# Patient Record
Sex: MALE | Race: WHITE | Employment: OTHER | URBAN - METROPOLITAN AREA
[De-identification: names, ages, dates, MRNs, and addresses within clinical notes are randomized per-mention and may not be internally consistent; named-entity substitution may affect disease eponyms.]

---

## 2017-01-27 DIAGNOSIS — E03.9 ACQUIRED HYPOTHYROIDISM: Primary | ICD-10-CM

## 2017-01-27 RX ORDER — LEVOTHYROXINE SODIUM 25 UG/1
TABLET ORAL
Qty: 90 TABLET | Refills: 0 | Status: SHIPPED | OUTPATIENT
Start: 2017-01-27 | End: 2017-03-02

## 2017-01-27 NOTE — TELEPHONE ENCOUNTER
levothyroxine (SYNTHROID, LEVOTHROID) 25 MCG tablet     Last Written Prescription Date: 2/10/2016  Last Quantity: 90, # refills: 3  Last Office Visit with [facility], [facility] or [facility] prescribing provider: 2/10/2016        TSH   Date Value Ref Range Status   02/10/2016 2.77 0.40 - 4.00 mU/L Final

## 2017-01-27 NOTE — TELEPHONE ENCOUNTER
Prescription approved per [facility] Refill Protocol.    Signed Prescriptions:                        Disp   Refills    levothyroxine (SYNTHROID/LEVOTHROID) 25 MC*90 tab*0        Sig: TAKE ONE TABLET BY MOUTH ONCE DAILY  Authorizing Provider: BIANCA LANE  Ordering User: DARCI OLMEDO, RN, BSN

## 2017-02-07 DIAGNOSIS — I10 HYPERTENSION GOAL BP (BLOOD PRESSURE) < 150/90: Primary | ICD-10-CM

## 2017-02-08 NOTE — TELEPHONE ENCOUNTER
triamterene-hydrochlorothiazide (MAXZIDE) 75-50 MG per tablet      Last Written Prescription Date: 2/10/2016  Last Fill Quantity: 45, # refills: 3  Last Office Visit with [facility] or [facility] prescribing provider: 2/10/2016       POTASSIUM   Date Value Ref Range Status   02/10/2016 4.3 3.4 - 5.3 mmol/L Final     CREATININE   Date Value Ref Range Status   02/10/2016 0.64* 0.66 - 1.25 mg/dL Final     BP Readings from Last 3 Encounters:   02/10/16 135/89   01/11/16 112/78   01/07/16 114/84     atenolol (TENORMIN) 25 MG tablet      Last Written Prescription Date: 2/10/2016  Last Fill Quantity: 90, # refills: 3    Last Office Visit with [facility], [facility] or [facility] prescribing provider:  2/10/2016   Future Office Visit:        BP Readings from Last 3 Encounters:   02/10/16 135/89   01/11/16 112/78   01/07/16 114/84

## 2017-02-09 RX ORDER — TRIAMTERENE AND HYDROCHLOROTHIAZIDE 75; 50 MG/1; MG/1
TABLET ORAL
Qty: 15 TABLET | Refills: 0 | Status: SHIPPED | OUTPATIENT
Start: 2017-02-09 | End: 2017-03-02

## 2017-02-09 RX ORDER — ATENOLOL 25 MG/1
TABLET ORAL
Qty: 30 TABLET | Refills: 0 | Status: SHIPPED | OUTPATIENT
Start: 2017-02-09 | End: 2017-03-02

## 2017-02-09 NOTE — TELEPHONE ENCOUNTER
Routing refill request to provider for review/approval because:  Drug interaction warning  High     Drug-Drug: triamterene-hydrochlorothiazide and potassium chloride   Co-administration of Potassium Preparations and Potassium-Sparing Diuretics may cause hyperkalemia, possibly leading to cardiac arrhythmias or cardiac arrest.     Patient needs to be seen because it has been more than 1 year since last office visit.    Ann Bradford RN

## 2017-02-10 ENCOUNTER — TELEPHONE (OUTPATIENT)
Dept: FAMILY MEDICINE | Facility: CLINIC | Age: 67
End: 2017-02-10

## 2017-02-10 DIAGNOSIS — J43.9 COPD (CHRONIC OBSTRUCTIVE PULMONARY DISEASE) WITH EMPHYSEMA (H): Primary | ICD-10-CM

## 2017-02-10 NOTE — TELEPHONE ENCOUNTER
Advair        Last Written Prescription Date: 02/10/2016  Last Fill Quantity: 3 inhaler, # refills: 3    Last Office Visit with [facility] or [facility] prescribing provider:  02/10/2016   Future Office Visit:       Date of Last Asthma Action Plan Letter:   There are no preventive care reminders to display for this patient.   Asthma Control Test:   ACT Total Scores 11/15/2010   ACT TOTAL SCORE 25   ASTHMA ER VISITS 1 = One   ASTHMA HOSPITALIZATIONS 1 = One       Date of Last Spirometry Test:   No results found for this or any previous visit.

## 2017-02-10 NOTE — TELEPHONE ENCOUNTER
Signed Prescriptions:                        Disp   Refills    atenolol (TENORMIN) 25 MG tablet           30 tab*0        Sig: TAKE ONE TABLET BY MOUTH ONCE DAILY  Authorizing Provider: LIANET REY    triamterene-hydrochlorothiazide (MAXZIDE) *15 tab*0        Sig: TAKE ONE-HALF TABLET BY MOUTH ONCE DAILY  Authorizing Provider: LIANET REY

## 2017-02-14 NOTE — TELEPHONE ENCOUNTER
TC - please inform patient a courtesy refill has been sent to her pharmacy as she is over due for an office visit. Last was seen on 2/10/16.  Please help patient scheduling an appointment either for physical or a follow up appointment.     Bakari RAMSEY RN, BSN

## 2017-02-14 NOTE — TELEPHONE ENCOUNTER
Called and spoke with Dick and his mom. They understood and will call back to schedule. Pia Reveles,

## 2017-02-14 NOTE — TELEPHONE ENCOUNTER
Donna is calling to check status on patient's Advair refill. Please call if problems or fill.  Thank-you,  .Colleen [place]

## 2017-03-02 ENCOUNTER — OFFICE VISIT (OUTPATIENT)
Dept: FAMILY MEDICINE | Facility: CLINIC | Age: 67
End: 2017-03-02
Payer: COMMERCIAL

## 2017-03-02 VITALS
OXYGEN SATURATION: 92 % | HEART RATE: 65 BPM | BODY MASS INDEX: 34.66 KG/M2 | WEIGHT: 203 LBS | SYSTOLIC BLOOD PRESSURE: 128 MMHG | HEIGHT: 64 IN | DIASTOLIC BLOOD PRESSURE: 84 MMHG | TEMPERATURE: 98.4 F

## 2017-03-02 DIAGNOSIS — Z23 NEED FOR VACCINATION: ICD-10-CM

## 2017-03-02 DIAGNOSIS — K21.9 GASTROESOPHAGEAL REFLUX DISEASE WITHOUT ESOPHAGITIS: ICD-10-CM

## 2017-03-02 DIAGNOSIS — Z12.11 SCREEN FOR COLON CANCER: ICD-10-CM

## 2017-03-02 DIAGNOSIS — S06.9X0D TRAUMATIC BRAIN INJURY, WITHOUT LOSS OF CONSCIOUSNESS, SUBSEQUENT ENCOUNTER: ICD-10-CM

## 2017-03-02 DIAGNOSIS — R26.9 ABNORMALITY OF GAIT: ICD-10-CM

## 2017-03-02 DIAGNOSIS — I10 HYPERTENSION GOAL BP (BLOOD PRESSURE) < 150/90: ICD-10-CM

## 2017-03-02 DIAGNOSIS — Z71.89 ADVANCED DIRECTIVES, COUNSELING/DISCUSSION: ICD-10-CM

## 2017-03-02 DIAGNOSIS — J43.9 PULMONARY EMPHYSEMA, UNSPECIFIED EMPHYSEMA TYPE (H): ICD-10-CM

## 2017-03-02 DIAGNOSIS — Z00.00 WELLNESS EXAMINATION: Primary | ICD-10-CM

## 2017-03-02 DIAGNOSIS — E03.9 ACQUIRED HYPOTHYROIDISM: ICD-10-CM

## 2017-03-02 DIAGNOSIS — E78.5 HYPERLIPIDEMIA LDL GOAL <130: ICD-10-CM

## 2017-03-02 DIAGNOSIS — B18.2 CHRONIC HEPATITIS C WITHOUT HEPATIC COMA (H): ICD-10-CM

## 2017-03-02 PROCEDURE — 99397 PER PM REEVAL EST PAT 65+ YR: CPT | Mod: 25 | Performed by: FAMILY MEDICINE

## 2017-03-02 PROCEDURE — 84443 ASSAY THYROID STIM HORMONE: CPT | Performed by: FAMILY MEDICINE

## 2017-03-02 PROCEDURE — 90732 PPSV23 VACC 2 YRS+ SUBQ/IM: CPT | Performed by: FAMILY MEDICINE

## 2017-03-02 PROCEDURE — 80061 LIPID PANEL: CPT | Performed by: FAMILY MEDICINE

## 2017-03-02 PROCEDURE — 87522 HEPATITIS C REVRS TRNSCRPJ: CPT | Performed by: FAMILY MEDICINE

## 2017-03-02 PROCEDURE — 80053 COMPREHEN METABOLIC PANEL: CPT | Performed by: FAMILY MEDICINE

## 2017-03-02 PROCEDURE — 36415 COLL VENOUS BLD VENIPUNCTURE: CPT | Performed by: FAMILY MEDICINE

## 2017-03-02 PROCEDURE — G0009 ADMIN PNEUMOCOCCAL VACCINE: HCPCS | Performed by: FAMILY MEDICINE

## 2017-03-02 RX ORDER — DIVALPROEX SODIUM 500 MG/1
TABLET, EXTENDED RELEASE ORAL
Qty: 180 TABLET | Refills: 3 | Status: SHIPPED | OUTPATIENT
Start: 2017-03-02 | End: 2018-04-02

## 2017-03-02 RX ORDER — TIOTROPIUM BROMIDE 18 UG/1
18 CAPSULE ORAL; RESPIRATORY (INHALATION) DAILY
Qty: 90 CAPSULE | Refills: 3 | Status: SHIPPED | OUTPATIENT
Start: 2017-03-02 | End: 2018-01-19

## 2017-03-02 RX ORDER — TRIAMTERENE AND HYDROCHLOROTHIAZIDE 75; 50 MG/1; MG/1
TABLET ORAL
Qty: 45 TABLET | Refills: 3 | Status: SHIPPED | OUTPATIENT
Start: 2017-03-02 | End: 2018-03-19

## 2017-03-02 RX ORDER — ATENOLOL 25 MG/1
25 TABLET ORAL DAILY
Qty: 90 TABLET | Refills: 3 | Status: SHIPPED | OUTPATIENT
Start: 2017-03-02 | End: 2018-08-08 | Stop reason: DRUGHIGH

## 2017-03-02 RX ORDER — POTASSIUM CHLORIDE 750 MG/1
10 TABLET, EXTENDED RELEASE ORAL DAILY
Qty: 90 TABLET | Refills: 3 | Status: SHIPPED | OUTPATIENT
Start: 2017-03-02 | End: 2018-03-18

## 2017-03-02 RX ORDER — LEVOTHYROXINE SODIUM 25 UG/1
25 TABLET ORAL DAILY
Qty: 90 TABLET | Refills: 3 | Status: SHIPPED | OUTPATIENT
Start: 2017-03-02 | End: 2018-04-02

## 2017-03-02 RX ORDER — MONTELUKAST SODIUM 10 MG/1
TABLET ORAL
Qty: 90 TABLET | Refills: 3 | Status: SHIPPED | OUTPATIENT
Start: 2017-03-02 | End: 2018-01-21

## 2017-03-02 NOTE — LETTER
[facility]  [address] [place] [address]    March 22, 2017    Gaudencio Avila  [address]  [facility] [address]          Dear Gaudencio,  Your results are normal.   Enclosed is a copy of your results.     [place] AORTA MEDICARE AAA SCREENING  3/21/2017 10:01 AM     HISTORY:  Screening for abdominal aortic aneurysm.    COMPARISON: None.    FINDINGS: There is no evidence for an abdominal aortic aneurysm.  Maximum diameter of the abdominal aorta is 2.7 cm.    Based on the measurements provided, the common iliac arteries are  ectatic measuring 1.9 cm in diameter bilaterally.    JESSICA OLIVERA MD    If you have any questions or concerns, please call myself or my nurse at [phone].      Sincerely,        Tanya Renee MD/genoveva

## 2017-03-02 NOTE — NURSING NOTE
"Chief Complaint   Patient presents with     Wellness Visit       Initial /84 (BP Location: Right arm, Patient Position: Chair, Cuff Size: Adult Regular)  Pulse 65  Temp 98.4  F (36.9  C) (Oral)  Ht 5' 4\" (1.626 m)  Wt 203 lb (92.1 kg)  SpO2 92%  BMI 34.84 kg/m2 Estimated body mass index is 34.84 kg/(m^2) as calculated from the following:    Height as of this encounter: 5' 4\" (1.626 m).    Weight as of this encounter: 203 lb (92.1 kg).  Medication Reconciliation: complete   Juliet Awan MA      "

## 2017-03-02 NOTE — LETTER
[facility]  [address] [place] [address]    March 6, 2017    Gaudencio Avila  [address]  [facility] [address]          Dear Gaudencio,    You still have hepatitis C. See your GI provider at [facility] to discuss treatment options.     Your thyroid, kidney, and blood glucose tests are normal. Your cholesterol is controlled    Enclosed is a copy of your results.     Results for orders placed or performed in visit on 03/02/17   COMPREHENSIVE METABOLIC PANEL   Result Value Ref Range    Sodium 140 133 - 144 mmol/L    Potassium 4.4 3.4 - 5.3 mmol/L    Chloride 104 94 - 109 mmol/L    Carbon Dioxide 24 20 - 32 mmol/L    Anion Gap 12 3 - 14 mmol/L    Glucose 71 70 - 99 mg/dL    Urea Nitrogen 16 7 - 30 mg/dL    Creatinine 0.70 0.66 - 1.25 mg/dL    GFR Estimate >90  Non  GFR Calc   >60 mL/min/1.7m2    GFR Estimate If Black >90   GFR Calc   >60 mL/min/1.7m2    Calcium 8.9 8.5 - 10.1 mg/dL    Bilirubin Total 0.4 0.2 - 1.3 mg/dL    Albumin 3.2 (L) 3.4 - 5.0 g/dL    Protein Total 8.6 6.8 - 8.8 g/dL    Alkaline Phosphatase 51 40 - 150 U/L    ALT 78 (H) 0 - 70 U/L    AST 44 0 - 45 U/L   Lipid panel reflex to direct LDL   Result Value Ref Range    Cholesterol 130 <200 mg/dL    Triglycerides 134 <150 mg/dL    HDL Cholesterol 28 (L) >39 mg/dL    LDL Cholesterol Calculated 75 <100 mg/dL    Non HDL Cholesterol 102 <130 mg/dL   TSH WITH FREE T4 REFLEX   Result Value Ref Range    TSH 2.17 0.40 - 4.00 mU/L   Hepatitis C RNA, quantitative   Result Value Ref Range    HCV RNA Quant IU/ml 00466 (A) HCVND [IU]/mL    Log of HCV RNA Qt 4.7 (H) <1.2 Log IU/mL       If you have any questions or concerns, please call myself or my nurse at [phone].      Sincerely,        Tanya Renee MD/HA

## 2017-03-02 NOTE — LETTER
My COPD Action Plan   Name: Gaudencio Avila    YOB: 1950   Date: 3/2/2017    My doctor: aTnya Renee MD   My clinic: [address] [id number] [phone]  My Controller Medicine: Tiotropium (Spiriva)   Serevent/Fluticasone (Advair)   Dose: spiriva one puff daily, Advair 250-50 one puff twice daily      My Rescue Medicine: Albuterol (Proair/Ventolin/Proventil) inhaler   Dose: 2 puffs every 4 hours as needed      My Flare Up Medicine:     Dose:    My COPD Severity: Severe = FeV1 < 30%-49%     Use of Oxygen: Oxygen Not Prescribed         GREEN ZONE       Doing well today      Usual level of activity and exercise    Usual amount of cough and mucus    No shortness of breath    Usual level of health (thinking clearly, sleeping well, feel like eating) Actions:      Take daily medicines    Use oxygen as prescribed    Follow regular exercise and diet plan    Avoid cigarette smoke and other irritants that harm the lungs           YELLOW ZONE          Having a bad day or flare up      Short of breath more than usual    A lot more sputum (mucus) than usual    Sputum looks yellow, green, tan, brown or bloody    More coughing or wheezing    Fever or chills    Less energy; trouble completing activities    Trouble thinking or focusing    Using quick relief inhaler or nebulizer more often    Poor sleep; symptoms wake me up    Do not feel like eating Actions:      Get plenty of rest    Take daily medicines    Use quick relief inhaler every 4 hours    If you use oxygen, call you doctor to see if you should adjust your oxygen    Do breathing exercises or other things to help you relax    Let a loved one, friend or neighbor know you are feeling worse    Call your care team if you have 2 or more symptoms.  Start taking steroids or antibiotics if directed by your care team           RED ZONE       Need medical care now      Severe shortness of breath (feel you can't  breathe)    Fever, chills    Not enough breath to do any activity    Trouble coughing up mucus, walking or talking    Blood in mucus    Frequent coughing   Rescue medicines are not working    Not able to sleep because of breathing    Feel confused or drowsy    Chest pain    Actions:      Call your health care team.  If you cannot reach your care team, call 911 or go to the emergency room.        Electronically signed by: Tanya Renee, March 2, 2017  Annual Reminders:  Meet with Care Team, Flu Shot every Fall and Pneumonia Shot at least once  Pharmacy: [id number] - [place] [id number] AT [facility]

## 2017-03-02 NOTE — PROGRESS NOTES
SUBJECTIVE:                                                            Gaudencio Avila is a 66 year old obese disabled male with history of TBI who presents with his  mother for Preventive Visit.    COPD well controlled with medications without dyspnea, wheezing or cough. He is lost to follow-up at [facility] for hepatitis C. His gait has become slow and shuffling. Hypertension well controlled on current medications without side effects, chest pain, or dyspnea. Patient also presents for follow-up of hypothyroidism, controlled on current medication.  Denies symptoms of hypo- or hyperthyroidism. Also presents for follow-up of GERD controlled with medication. Hypercholesterolemia well controlled with current treatment plan without side effects.     Healthy Habits:    Do you get at least three servings of calcium containing foods daily (dairy, green leafy vegetables, etc.)? yes    Amount of exercise or daily activities, outside of work: no    Problems taking medications regularly No    Medication side effects: No    Have you had an eye exam in the past two years? yes    Do you see a dentist twice per year? yes    Do you have sleep apnea, excessive snoring or daytime drowsiness?no    COGNITIVE SCREEN  1) Repeat 3 items (Banana, Sunrise, Chair)    2) Clock draw: NORMAL  3) 3 item recall: Recalls 3 objects  Results: 3 items recalled: COGNITIVE IMPAIRMENT LESS LIKELY  Mini-CogTM Copyright S Gela. Licensed by the author for use in [facility]; reprinted with permission ([email or web address]). All rights reserved.        Social History   Substance Use Topics     Smoking status: Former Smoker     Packs/day: 0.50     Years: 4.00     Types: Cigarettes     Quit date: 1/1/1970     Smokeless tobacco: Never Used     Alcohol use No       The patient does not drink >3 drinks per day nor >7 drinks per week.    Today's PHQ-2 Score:   PHQ-2 ( 1999 [employer]) 3/2/2017 2/10/2016   Q1: Little interest or pleasure in doing  things 0 0   Q2: Feeling down, depressed or hopeless 0 0   PHQ-2 Score 0 0       Do you feel safe in your environment - Yes    Do you have a Health Care Directive?: Yes: Advance Directive has been received and scanned.    Current providers sharing in care for this patient include:   Patient Care Team:  Tanya Renee MD as PCP - General (Family Practice)  Mariah Welch NP as Nurse Practitioner (Nurse Practitioner)      Hearing impairment: No    Ability to successfully perform activities of daily living: Yes, no assistance needed     Fall risk:  Fallen 2 or more times in the past year?: No  Any fall with injury in the past year?: No    Home safety:  none identified      The following health maintenance items are reviewed in Epic and correct as of today:  Health Maintenance   Topic Date Due     AORTIC ANEURYSM SCREENING (SYSTEM ASSIGNED)  04/07/2014     COLONOSCOPY Q3 YR INBASKET MESSAGE  03/21/2016     TSH W/ FREE T4 REFLEX Q1 YEAR (NO INBASKET)  02/10/2017     CMP Q1 YR (NO INBASKET)  02/10/2017     LIPID MONITORING Q1 YEAR( NO INBASKET)  02/10/2017     PNEUMOCOCCAL (2 of 2 - PPSV23) 02/10/2017     FALL RISK ASSESSMENT  02/10/2017     INFLUENZA VACCINE (SYSTEM ASSIGNED)  09/01/2017     COPD ACTION PLAN Q1 YR  03/02/2018     TETANUS IMMUNIZATION (SYSTEM ASSIGNED)  11/15/2020     ADVANCE DIRECTIVE PLANNING Q5 YRS (NO INBASKET)  03/02/2022     SPIROMETRY ONETIME  Completed     HEPATITIS C SCREENING  Addressed     ROS:  C: NEGATIVE for fever, chills, change in weight  I: NEGATIVE for worrisome rashes, moles or lesions  E: NEGATIVE for vision changes or irritation  E/M: NEGATIVE for ear, mouth and throat problems  RESP:as above  CV: NEGATIVE for chest pain, palpitations or peripheral edema  GI: NEGATIVE for nausea, abdominal pain, heartburn, or change in bowel habits  : NEGATIVE for frequency, dysuria, or hematuria  M: NEGATIVE for significant arthralgias or myalgia  NEURO: as above   E: NEGATIVE for temperature  "intolerance, skin/hair changes  H: NEGATIVE for bleeding problems  P: NEGATIVE for changes in mood or affect    Problem list, Medication list, Allergies, and Medical/Social/Surgical histories reviewed in EPIC and updated as appropriate.  OBJECTIVE:                                                            /84 (BP Location: Right arm, Patient Position: Chair, Cuff Size: Adult Regular)  Pulse 65  Temp 98.4  F (36.9  C) (Oral)  Ht 5' 4\" (1.626 m)  Wt 203 lb (92.1 kg)  SpO2 92%  BMI 34.84 kg/m2 Estimated body mass index is 34.84 kg/(m^2) as calculated from the following:    Height as of this encounter: 5' 4\" (1.626 m).    Weight as of this encounter: 203 lb (92.1 kg).  EXAM:   GENERAL: alert, no distress and obese  EYES: Eyes grossly normal to inspection, PERRL and conjunctivae and sclerae normal  HENT: normal cephalic/atraumatic, ear canals and TM's normal, nose and mouth without ulcers or lesions, oropharynx clear, oral mucous membranes moist and poor dentition   NECK: no adenopathy, no asymmetry, masses, or scars and thyroid normal to palpation  RESP: decreased breath sounds throughout  CV: regular rate and rhythm, normal S1 S2, no S3 or S4, no murmur, click or rub, no peripheral edema and peripheral pulses strong  ABDOMEN: soft, nontender, no hepatosplenomegaly, no masses and bowel sounds normal  MS: no deformity, slow/shuffling gait   SKIN: no suspicious lesions or rashes and seborrhea   NEURO: Normal strength and tone, sensory exam grossly normal, abnormal mental status - sparse speech and slow, shuffling gait   PSYCH: affect normal/bright    ASSESSMENT / PLAN:                                                            (Z00.00) Wellness examination  (primary encounter diagnosis)  Plan: Lipid panel reflex to direct LDL, US Aorta         Medicare AAA Screening, PNEUMOVOCCAL VACCINE 23        VALENT (PNEUMOVAX 23), GASTROENTEROLOGY ADULT         REF CONSULT ONLY          (S06.9X0D) Traumatic brain injury, " without loss of consciousness, subsequent encounter  Plan: divalproex (DEPAKOTE ER) 500 MG 24 hr tablet,         NEUROLOGY ADULT REFERRAL          (J43.9) Pulmonary emphysema, unspecified emphysema type (H)  Plan: COPD ACTION PLAN, fluticasone-salmeterol         (ADVAIR DISKUS) 500-50 MCG/DOSE diskus inhaler,        montelukast (SINGULAIR) 10 MG tablet,         tiotropium (SPIRIVA HANDIHALER) 18 MCG capsule          (B18.2) Chronic hepatitis C without hepatic coma (H)  Plan: COMPREHENSIVE METABOLIC PANEL, GASTROENTEROLOGY        ADULT REF CONSULT ONLY, Hepatitis C RNA,         quantitative          (R26.9) Abnormality of gait  Comment: Differential diagnoses would include: Parkinsonism   Plan: NEUROLOGY ADULT REFERRAL          (I10) Hypertension goal BP (blood pressure) < 150/90  Comment: Well controlled with medications without side effects.   Plan: COMPREHENSIVE METABOLIC PANEL, atenolol         (TENORMIN) 25 MG tablet,         triamterene-hydrochlorothiazide (MAXZIDE) 75-50        MG per tablet, potassium chloride (POTASSIUM         CHLORIDE) 10 MEQ tablet          (E03.9) Acquired hypothyroidism  Comment: euthyroid on replacement   Lab Results   Component Value Date    TSH 2.77 02/10/2016   Plan: TSH WITH FREE T4 REFLEX, levothyroxine         (SYNTHROID/LEVOTHROID) 25 MCG tablet          (K21.9) Gastroesophageal reflux disease without esophagitis  Comment: Well controlled with medications without side effects.   Plan: omeprazole (PRILOSEC) 20 MG CR capsule          (E78.5) Hyperlipidemia LDL goal <130  Plan: COMPREHENSIVE METABOLIC PANEL, Lipid panel         reflex to direct LDL             End of Life Planning:  Patient currently has an advanced directive: No.  I have verified the patient's ablity to prepare an advanced directive/make health care decisions.  Literature was provided to assist patient in preparing an advanced directive.    COUNSELING:  Reviewed preventive health counseling, as reflected in  "patient instructions  Special attention given to:       Consider AAA screening for ages 65-75 and smoking history       Regular exercise       Healthy diet/nutrition       Dental care       Aspirin Prophylaxsis       Hepatitis C screening       Colon cancer screening       Osteoporosis Prevention/Bone Health       The 10-year ASCVD risk score (Santiago MONTILLA Jr., et al., 2013) is: 17.7%    Values used to calculate the score:      Age: 66 years      Sex: Male      Is Non- : No      Diabetic: No      Tobacco smoker: No      Systolic Blood Pressure: 128 mmHg      Is Prescribed Antihypertensives: Yes      HDL Cholesterol: 29 mg/dL      Total Cholesterol: 134 mg/dL        Estimated body mass index is 34.84 kg/(m^2) as calculated from the following:    Height as of this encounter: 5' 4\" (1.626 m).    Weight as of this encounter: 203 lb (92.1 kg).  Weight management plan: Discussed healthy diet and exercise guidelines and patient will follow up in 12 months in clinic to re-evaluate.   reports that he quit smoking about 47 years ago. His smoking use included Cigarettes. He has a 2.00 pack-year smoking history. He has never used smokeless tobacco.      Appropriate preventive services were discussed with this patient, including applicable screening as appropriate for cardiovascular disease, diabetes, osteopenia/osteoporosis, and glaucoma.  As appropriate for age/gender, discussed screening for colorectal cancer, prostate cancer, breast cancer, and cervical cancer. Checklist reviewing preventive services available has been given to the patient.    Reviewed patients plan of care and provided an AVS. The Basic Care Plan (routine screening as documented in Health Maintenance) for Gaudencio meets the Care Plan requirement. This Care Plan has been established and reviewed with the Patient and caregiver.    Counseling Resources:  ATP IV Guidelines  Pooled Cohorts Equation Calculator  Breast Cancer Risk " Calculator  FRAX Risk Assessment  ICSI Preventive Guidelines  Dietary Guidelines for Americans, 2010  USDA's MyPlate  ASA Prophylaxis  Lung CA Screening    Tanya Renee MD  [facility]

## 2017-03-02 NOTE — PATIENT INSTRUCTIONS
Call the imaging center at [phone] or  [phone] to schedule your aortic ultrasound to screen for aneursym.      [facility]    If you have any questions regarding to your visit please contact your care team:       Team Red:   Clinic Hours Telephone Number   Dr. Tanya Saha, NP   7am-7pm  Monday - Thursday   7am-5pm  Fridays  [phone]  (Appointment scheduling available 24/7)    Questions about your visit?   Team Line  [phone]   [facility] [place] [facility] 11am-9pm Monday-Friday Saturday-Sunday- 9am-5pm   [place] - 5pm-9pm Monday-Friday Saturday-Sunday- 9am-5pm  [phone] - [place]   [phone] - [place]       What options do I have for visits at the clinic other than the traditional office visit?  To expand how we care for you, many of our providers are utilizing electronic visits (e-visits) and telephone visits, when medically appropriate, for interactions with their patients rather than a visit in the clinic.   We also offer nurse visits for many medical concerns. Just like any other service, we will bill your insurance company for this type of visit based on time spent on the phone with your provider. Not all insurance companies cover these visits. Please check with your medical insurance if this type of visit is covered. You will be responsible for any charges that are not paid by your insurance.      E-visits via OpenDrive:  generally incur a $35.00 fee.  Telephone visits:  Time spent on the phone: *charged based on time that is spent on the phone in increments of 10 minutes. Estimated cost:   5-10 mins $30.00   11-20 mins. $59.00   21-30 mins. $85.00     Use OpenClovist (secure email communication and access to your chart) to send your primary care provider a message or make an appointment. Ask someone on your Team how to sign up for OpenDrive.  For a Price Quote for your services, please call our  Consumer [place] Line at [phone].      As always, Thank you for trusting us with your health care needs!  Taylor MITCHELL MA      Preventive Health Recommendations:   Male Ages 65 and over    Yearly exam:             See your health care provider every year in order to  o   Review health changes.   o   Discuss preventive care.    o   Review your medicines if your doctor has prescribed any.    Talk with your health care provider about whether you should have a test to screen for prostate cancer (PSA).    Every 3 years, have a diabetes test (fasting glucose). If you are at risk for diabetes, you should have this test more often.    Every 5 years, have a cholesterol test. Have this test more often if you are at risk for high cholesterol or heart disease.     Every 10 years, have a colonoscopy. Or, have a yearly FIT test (stool test). These exams will check for colon cancer.    Talk to with your health care provider about screening for Abdominal Aortic Aneurysm if you have a family history of AAA or have a history of smoking.  Shots:     Get a flu shot each year.     Get a tetanus shot every 10 years.     Talk to your doctor about your pneumonia vaccines. There are now two you should receive - Pneumovax (PPSV 23) and Prevnar (PCV 13).    Talk to your doctor about a shingles vaccine.     Talk to your doctor about the hepatitis B vaccine.  Nutrition:     Eat at least 5 servings of fruits and vegetables each day.     Eat whole-grain bread, whole-wheat pasta and brown rice instead of white grains and rice.     Talk to your doctor about Calcium and Vitamin D.   Lifestyle    Exercise for at least 150 minutes a week (30 minutes a day, 5 days a week). This will help you control your weight and prevent disease.     Limit alcohol to one drink per day.     No smoking.     Wear sunscreen to prevent skin cancer.     See your dentist every six months for an exam and cleaning.     See your eye doctor every 1 to 2 years to screen for  conditions such as glaucoma, macular degeneration and cataracts.

## 2017-03-02 NOTE — MR AVS SNAPSHOT
After Visit Summary   3/2/2017    Gaudencio Avila    MRN: [id number]           Patient Information     Date Of Birth          1950        Visit Information        Provider Department      3/2/2017 2:20 PM Tanya Renee MD [facility]        Today's Diagnoses     Wellness examination    -  1    Traumatic brain injury, without loss of consciousness, subsequent encounter        Pulmonary emphysema, unspecified emphysema type (H)        Chronic hepatitis C without hepatic coma (H)        Abnormality of gait        Hypertension goal BP (blood pressure) < 150/90        Acquired hypothyroidism        Gastroesophageal reflux disease without esophagitis        Hyperlipidemia LDL goal <130        Screen for colon cancer        Advanced directives, counseling/discussion        Need for vaccination          Care Instructions    Call the imaging center at [phone] or  [phone] to schedule your aortic ultrasound to screen for aneursym.      [facility] (formerly [facility])[3]    If you have any questions regarding to your visit please contact your care team:       Team Red:   Clinic Hours Telephone Number   Dr. Tanya Saha, NP   7am-7pm  Monday - Thursday   7am-5pm  Fridays  [phone]  (Appointment scheduling available 24/7)    Questions about your visit?   Team Line  [phone]   Urgent Care - [place] and [place] - 11am-9pm Monday-Friday Saturday-Sunday- 9am-5pm   [place] - 5pm-9pm Monday-Friday Saturday-Sunday- 9am-5pm  [phone] - [place]   [phone] - [place]       What options do I have for visits at the clinic other than the traditional office visit?  To expand how we care for you, many of our providers are utilizing electronic visits (e-visits) and telephone visits, when medically appropriate, for interactions with their patients rather than a visit in the clinic.   We also offer nurse visits for many  medical concerns. Just like any other service, we will bill your insurance company for this type of visit based on time spent on the phone with your provider. Not all insurance companies cover these visits. Please check with your medical insurance if this type of visit is covered. You will be responsible for any charges that are not paid by your insurance.      E-visits via [employer]:  generally incur a $35.00 fee.  Telephone visits:  Time spent on the phone: *charged based on time that is spent on the phone in increments of 10 minutes. Estimated cost:   5-10 mins $30.00   11-20 mins. $59.00   21-30 mins. $85.00     Use Quolaw (secure email communication and access to your chart) to send your primary care provider a message or make an appointment. Ask someone on your Team how to sign up for Quolaw.  For a Price Quote for your services, please call our uStudio Line at [phone].      As always, Thank you for trusting us with your health care needs!  Taylor MITCHELL MA      Preventive Health Recommendations:   Male Ages 65 and over    Yearly exam:             See your health care provider every year in order to  o   Review health changes.   o   Discuss preventive care.    o   Review your medicines if your doctor has prescribed any.    Talk with your health care provider about whether you should have a test to screen for prostate cancer (PSA).    Every 3 years, have a diabetes test (fasting glucose). If you are at risk for diabetes, you should have this test more often.    Every 5 years, have a cholesterol test. Have this test more often if you are at risk for high cholesterol or heart disease.     Every 10 years, have a colonoscopy. Or, have a yearly FIT test (stool test). These exams will check for colon cancer.    Talk to with your health care provider about screening for Abdominal Aortic Aneurysm if you have a family history of AAA or have a history of smoking.  Shots:     Get a flu shot each year.     Get a  tetanus shot every 10 years.     Talk to your doctor about your pneumonia vaccines. There are now two you should receive - Pneumovax (PPSV 23) and Prevnar (PCV 13).    Talk to your doctor about a shingles vaccine.     Talk to your doctor about the hepatitis B vaccine.  Nutrition:     Eat at least 5 servings of fruits and vegetables each day.     Eat whole-grain bread, whole-wheat pasta and brown rice instead of white grains and rice.     Talk to your doctor about Calcium and Vitamin D.   Lifestyle    Exercise for at least 150 minutes a week (30 minutes a day, 5 days a week). This will help you control your weight and prevent disease.     Limit alcohol to one drink per day.     No smoking.     Wear sunscreen to prevent skin cancer.     See your dentist every six months for an exam and cleaning.     See your eye doctor every 1 to 2 years to screen for conditions such as glaucoma, macular degeneration and cataracts.        Follow-ups after your visit        Additional Services     GASTROENTEROLOGY ADULT REF CONSULT ONLY       Preferred Location: [facility] [phone]      Please be aware that coverage of these services is subject to the terms and limitations of your health insurance plan.  Call member services at your health plan with any benefit or coverage questions.  Any procedures must be performed at a [place] facility OR coordinated by your clinic's referral office.    Please bring the following with you to your appointment:    (1) Any X-Rays, CTs or MRIs which have been performed.  Contact the facility where they were done to arrange for  prior to your scheduled appointment.    (2) List of current medications   (3) This referral request   (4) Any documents/labs given to you for this referral            GASTROENTEROLOGY ADULT REF PROCEDURE ONLY       Last Lab Result: Creatinine (mg/dL)       Date                     Value                 02/10/2016               0.64 (L)         ----------  Body mass index  is 34.84 kg/(m^2).     Needed:  No  Language:  English    Patient will be contacted to schedule procedure.     Please be aware that coverage of these services is subject to the terms and limitations of your health insurance plan.  Call member services at your health plan with any benefit or coverage questions.  Any procedures must be performed at a [place] facility OR coordinated by your clinic's referral office.    Please bring the following with you to your appointment:    (1) Any X-Rays, CTs or MRIs which have been performed.  Contact the facility where they were done to arrange for  prior to your scheduled appointment.    (2) List of current medications   (3) This referral request   (4) Any documents/labs given to you for this referral            NEUROLOGY ADULT REFERRAL       Your provider has referred you to: G: [place] [facility] [phone]   [email or web address]    Reason for Referral: Consult    Please be aware that coverage of these services is subject to the terms and limitations of your health insurance plan.  Call member services at your health plan with any benefit or coverage questions.      Please bring the following with you to your appointment:    (1) Any X-Rays, CTs or MRIs which have been performed.  Contact the facility where they were done to arrange for  prior to your scheduled appointment.    (2) List of current medications  (3) This referral request   (4) Any documents/labs given to you for this referral                  Follow-up notes from your care team     Return in about 1 year (around 3/2/2018) for Wellness visit (fasting labs up to one week prior).      Who to contact     If you have questions or need follow up information about today's clinic visit or your schedule please contact [facility] directly at [phone].  Normal or non-critical lab and imaging results will be communicated to you by Ángela  "letter or phone within 4 business days after the clinic has received the results. If you do not hear from us within 7 days, please contact the clinic through [employer]IN[employer]PUBL[employer] or phone. If you have a critical or abnormal lab result, we will notify you by phone as soon as possible.  Submit refill requests through [employer] or call your pharmacy and they will forward the refill request to us. Please allow 3 business days for your refill to be completed.          Additional Information About Your Visit        B[employer]IN[employer]PUBLIC Information     [employer] lets you send messages to your doctor, view your test results, renew your prescriptions, schedule appointments and more. To sign up, go to [email or web address] . Click on \"Log in\" on the left side of the screen, which will take you to the Welcome page. Then click on \"Sign up Now\" on the right side of the page.     You will be asked to enter the access code listed below, as well as some personal information. Please follow the directions to create your username and password.     Your access code is: [email or web address]  Expires: 2017  3:02 PM     Your access code will  in 90 days. If you need help or a new code, please call your [place] clinic or [phone].        Care EveryWhere ID     This is your Care EveryWhere ID. This could be used by other organizations to access your [place] medical records  [id number]        Your Vitals Were     Pulse Temperature Height Pulse Oximetry BMI (Body Mass Index)       65 98.4  F (36.9  C) (Oral) 5' 4\" (1.626 m) 92% 34.84 kg/m2        Blood Pressure from Last 3 Encounters:   17 128/84   02/10/16 135/89   16 112/78    Weight from Last 3 Encounters:   17 203 lb (92.1 kg)   02/10/16 188 lb (85.3 kg)   16 182 lb 6 oz (82.7 kg)              We Performed the Following     COMPREHENSIVE METABOLIC PANEL     COPD ACTION PLAN     GASTROENTEROLOGY ADULT REF CONSULT ONLY     GASTROENTEROLOGY ADULT REF PROCEDURE ONLY     Hepatitis C " RNA, quantitative     Lipid panel reflex to direct LDL     NEUROLOGY ADULT REFERRAL     PNEUMOVOCCAL VACCINE 23 VALENT (PNEUMOVAX 23)     TSH WITH FREE T4 REFLEX     US [place]orta Medicare AAA Screening          Today's Medication Changes          These changes are accurate as of: 3/2/17  3:02 PM.  If you have any questions, ask your nurse or doctor.               These medicines have changed or have updated prescriptions.        Dose/Directions    atenolol 25 MG tablet   Commonly known as:  TENORMIN   This may have changed:  See the new instructions.   Used for:  Hypertension goal BP (blood pressure) < 150/90   Changed by:  Tanya Renee MD        Dose:  25 mg   Take 1 tablet (25 mg) by mouth daily   Quantity:  90 tablet   Refills:  3       fluticasone-salmeterol 500-50 MCG/DOSE diskus inhaler   Commonly known as:  ADVAIR DISKUS   This may have changed:  See the new instructions.   Used for:  Pulmonary emphysema, unspecified emphysema type (H)   Changed by:  Tanya Renee MD        INHALE 1 PUFF INTO THE LUNGS 2 TIMES DAILY   Quantity:  3 Inhaler   Refills:  3       levothyroxine 25 MCG tablet   Commonly known as:  SYNTHROID/LEVOTHROID   This may have changed:  See the new instructions.   Used for:  Acquired hypothyroidism   Changed by:  Tanya Renee MD        Dose:  25 mcg   Take 1 tablet (25 mcg) by mouth daily   Quantity:  90 tablet   Refills:  3       triamterene-hydrochlorothiazide 75-50 MG per tablet   Commonly known as:  MAXZIDE   This may have changed:  See the new instructions.   Used for:  Hypertension goal BP (blood pressure) < 150/90   Changed by:  Tanya Renee MD        TAKE ONE-HALF TABLET BY MOUTH ONCE DAILY   Quantity:  45 tablet   Refills:  3            Where to get your medicines      These medications were sent to [facility]/pharmacy #[id number] - [place], [place] - [facility] [address] OF [address], [place] [address]     Phone:  [phone]     atenolol 25 MG  tablet    divalproex 500 MG 24 hr tablet    fluticasone-salmeterol 500-50 MCG/DOSE diskus inhaler    levothyroxine 25 MCG tablet    montelukast 10 MG tablet    omeprazole 20 MG CR capsule    potassium chloride 10 MEQ tablet    tiotropium 18 MCG capsule    triamterene-hydrochlorothiazide 75-50 MG per tablet                Primary Care Provider Office Phone # Fax #    Tanya Renee [phone]       [address] [address]        Goals        General    Medical (pt-stated)     Notes - Note created  12/18/2015 12:27 PM by Pooja Campbell RN    Patient/mom will state understanding of S/S of worsening respiratory status and appropriate interventions, such as, calling clinic or seeking emergency care.       Medical (pt-stated)     Notes - Note created  12/18/2015 12:28 PM by Pooja Campbell RN    Patient/mom will state understanding regarding interventions to use to maintain stable respiratory status, such as, appropriate nutrition and hydration, appropriate medication/inhaler/nebulizer use.          Thank you!     Thank you for choosing [facility]  for your care. Our goal is always to provide you with excellent care. Hearing back from our patients is one way we can continue to improve our services. Please take a few minutes to complete the written survey that you may receive in the mail after your visit with us. Thank you!             Your Updated Medication List - Protect others around you: Learn how to safely use, store and throw away your medicines at www.disposemymeds.org.          This list is accurate as of: 3/2/17  3:02 PM.  Always use your most recent med list.                   Brand Name Dispense Instructions for use    * albuterol (2.5 MG/3ML) 0.083% neb solution     225 mL    TAKE 3 MLS BY NEBULIZATION EVERY 4 HOURS AS NEEDED       * albuterol 108 (90 BASE) MCG/ACT Inhaler    albuterol    1 Inhaler    Inhale 1-2 puffs into the lungs every  4 hours as needed       aspirin 81 MG tablet      Take 1 tablet by mouth daily.       atenolol 25 MG tablet    TENORMIN    90 tablet    Take 1 tablet (25 mg) by mouth daily       cholecalciferol 1000 UNIT tablet    vitamin D     Take 1 tablet (1,000 Units) by mouth daily       divalproex 500 MG 24 hr tablet    DEPAKOTE ER    180 tablet    TAKE 2 TABLETS (1,000 MG) BY MOUTH DAILY       fluticasone-salmeterol 500-50 MCG/DOSE diskus inhaler    ADVAIR DISKUS    3 Inhaler    INHALE 1 PUFF INTO THE LUNGS 2 TIMES DAILY       levothyroxine 25 MCG tablet    SYNTHROID/LEVOTHROID    90 tablet    Take 1 tablet (25 mcg) by mouth daily       montelukast 10 MG tablet    SINGULAIR    90 tablet    TAKE 1 TABLET (10 MG) BY MOUTH AT BEDTIME       omeprazole 20 MG CR capsule    priLOSEC    90 capsule    Take 1 capsule (20 mg) by mouth daily       potassium chloride 10 MEQ tablet    potassium chloride    90 tablet    Take 1 tablet (10 mEq) by mouth daily       tiotropium 18 MCG capsule    SPIRIVA HANDIHALER    90 capsule    Inhale 1 capsule (18 mcg) into the lungs daily Inhale contents of one capsule       triamterene-hydrochlorothiazide 75-50 MG per tablet    MAXZIDE    45 tablet    TAKE ONE-HALF TABLET BY MOUTH ONCE DAILY       * Notice:  This list has 2 medication(s) that are the same as other medications prescribed for you. Read the directions carefully, and ask your doctor or other care provider to review them with you.

## 2017-03-03 LAB
ALBUMIN SERPL-MCNC: 3.2 G/DL (ref 3.4–5)
ALP SERPL-CCNC: 51 U/L (ref 40–150)
ALT SERPL W P-5'-P-CCNC: 78 U/L (ref 0–70)
ANION GAP SERPL CALCULATED.3IONS-SCNC: 12 MMOL/L (ref 3–14)
AST SERPL W P-5'-P-CCNC: 44 U/L (ref 0–45)
BILIRUB SERPL-MCNC: 0.4 MG/DL (ref 0.2–1.3)
BUN SERPL-MCNC: 16 MG/DL (ref 7–30)
CALCIUM SERPL-MCNC: 8.9 MG/DL (ref 8.5–10.1)
CHLORIDE SERPL-SCNC: 104 MMOL/L (ref 94–109)
CHOLEST SERPL-MCNC: 130 MG/DL
CO2 SERPL-SCNC: 24 MMOL/L (ref 20–32)
CREAT SERPL-MCNC: 0.7 MG/DL (ref 0.66–1.25)
GFR SERPL CREATININE-BSD FRML MDRD: ABNORMAL ML/MIN/1.7M2
GLUCOSE SERPL-MCNC: 71 MG/DL (ref 70–99)
HDLC SERPL-MCNC: 28 MG/DL
LDLC SERPL CALC-MCNC: 75 MG/DL
NONHDLC SERPL-MCNC: 102 MG/DL
POTASSIUM SERPL-SCNC: 4.4 MMOL/L (ref 3.4–5.3)
PROT SERPL-MCNC: 8.6 G/DL (ref 6.8–8.8)
SODIUM SERPL-SCNC: 140 MMOL/L (ref 133–144)
TRIGL SERPL-MCNC: 134 MG/DL
TSH SERPL DL<=0.005 MIU/L-ACNC: 2.17 MU/L (ref 0.4–4)

## 2017-03-06 LAB
HCV RNA SERPL NAA+PROBE-ACNC: ABNORMAL [IU]/ML
HCV RNA SERPL NAA+PROBE-LOG IU: 4.7 LOG IU/ML

## 2017-03-06 NOTE — PROGRESS NOTES
Mail letter:  You still have hepatitis C. See your GI provider at [facility] to discuss treatment options.     Your thyroid, kidney, and blood glucose tests are normal. Your cholesterol is controlled.     Tanya Renee MD

## 2017-03-16 DIAGNOSIS — I10 HYPERTENSION GOAL BP (BLOOD PRESSURE) < 150/90: ICD-10-CM

## 2017-03-17 RX ORDER — POTASSIUM CHLORIDE 750 MG/1
TABLET, FILM COATED, EXTENDED RELEASE ORAL
Qty: 90 TABLET | Refills: 2
Start: 2017-03-17

## 2017-03-21 ENCOUNTER — RADIANT APPOINTMENT (OUTPATIENT)
Dept: ULTRASOUND IMAGING | Facility: CLINIC | Age: 67
End: 2017-03-21
Attending: FAMILY MEDICINE
Payer: COMMERCIAL

## 2017-03-21 PROCEDURE — 76706 US ABDL AORTA SCREEN AAA: CPT

## 2017-03-27 ENCOUNTER — TRANSFERRED RECORDS (OUTPATIENT)
Dept: HEALTH INFORMATION MANAGEMENT | Facility: CLINIC | Age: 67
End: 2017-03-27

## 2017-03-31 ENCOUNTER — TELEPHONE (OUTPATIENT)
Dept: FAMILY MEDICINE | Facility: CLINIC | Age: 67
End: 2017-03-31

## 2017-03-31 DIAGNOSIS — J43.9 PULMONARY EMPHYSEMA, UNSPECIFIED EMPHYSEMA TYPE (H): Primary | ICD-10-CM

## 2017-04-04 NOTE — TELEPHONE ENCOUNTER
Please clarify request as refills were already sent - is this a prior authorization request?    Signed Prescriptions:                        Disp   Refills    ADVAIR DISKUS 500-50 MCG/DOSE diskus inhal*3 Inha*3        Sig: INHALE 1 PUFF INTO THE LUNGS 2 TIMES DAILY  Authorizing Provider: BIANCA LANE

## 2017-04-04 NOTE — TELEPHONE ENCOUNTER
fluticasone-salmeterol (ADVAIR DISKUS) 500-50 MCG/DOSE diskus inhaler      Last Written Prescription Date: 3/2/17  Last Fill Quantity: 3, # refills: 3    Last Office Visit with G, P or [facility] prescribing provider:  3/2/17   Future Office Visit:       Date of Last Asthma Action Plan Letter:   There are no preventive care reminders to display for this patient.   Asthma Control Test:   ACT Total Scores 11/15/2010   ACT TOTAL SCORE 25   ASTHMA ER VISITS 1 = One   ASTHMA HOSPITALIZATIONS 1 = One       Date of Last Spirometry Test:   No results found for this or any previous visit.

## 2017-04-04 NOTE — TELEPHONE ENCOUNTER
Routing refill request to provider for review/approval because:  Patient not taking for Asthma/COPD per G refill protocol list.    Please advise.    Haritha Ellison RN - BC

## 2017-04-28 ENCOUNTER — TRANSFERRED RECORDS (OUTPATIENT)
Dept: HEALTH INFORMATION MANAGEMENT | Facility: CLINIC | Age: 67
End: 2017-04-28

## 2017-04-30 ENCOUNTER — TELEPHONE (OUTPATIENT)
Dept: FAMILY MEDICINE | Facility: CLINIC | Age: 67
End: 2017-04-30

## 2017-04-30 NOTE — TELEPHONE ENCOUNTER
Hello,  Someone from [facility] pharmacy called for this patient.  She wanted to leave a message for his primary care provider to let you know that you should set up an appointment to check his potassium levels because of the medications he is taking.    Please contact this patient.  Thank you!  Naz FARLEY  Central Scheduler

## 2017-05-01 NOTE — TELEPHONE ENCOUNTER
Last potassium is within normal range.  Please review and advise if patient will need to complete potassium.    Bakari RAMSEY RN, BSN

## 2018-02-09 ENCOUNTER — TRANSFERRED RECORDS (OUTPATIENT)
Dept: HEALTH INFORMATION MANAGEMENT | Facility: CLINIC | Age: 68
End: 2018-02-09

## 2018-03-18 DIAGNOSIS — I10 HYPERTENSION GOAL BP (BLOOD PRESSURE) < 150/90: ICD-10-CM

## 2018-03-19 NOTE — TELEPHONE ENCOUNTER
"Routing refill request to provider for review/approval because:  Labs out of range: Last OV  3/2/2017   Recent (12 mo) or future (30 days) visit within the authorizing provider's specialty    Normal serum potassium in past 12 months     Requested Prescriptions   Pending Prescriptions Disp Refills     KLOR-CON 10 MEQ tablet [Pharmacy Med Name: KLOR-CON 10 MEQ TABLET] 90 tablet 2     Sig: TAKE 1 TABLET (10 MEQ) BY MOUTH DAILY    Potassium Supplements Protocol Failed    3/19/2018  7:42 AM       Failed - Recent (12 mo) or future (30 days) visit within the authorizing provider's specialty    Patient had office visit in the last 12 months or has a visit in the next 30 days with authorizing provider or within the authorizing provider's specialty.  See \"Patient Info\" tab in inbasket, or \"Choose Columns\" in Meds & Orders section of the refill encounter.           Failed - Normal serum potassium in past 12 months    Recent Labs   Lab Test  03/02/17   1509   POTASSIUM  4.4                   Passed - Patient is age 18 or older        Jesusita Pizano RN    "

## 2018-03-19 NOTE — TELEPHONE ENCOUNTER
Routing refill request to provider for review/approval because:  Patient needs to be seen because it has been more than 1 year since last office visit.  Please see drug to drug interaction warning as well    Drug-Drug: triamterene-hydrochlorothiazide and potassium chloride  Co-administration of Potassium Preparations and Potassium-Sparing Diuretics may cause hyperkalemia, possibly leading to cardiac arrhythmias or cardiac arrest.   Details      Analy Jurado RN

## 2018-03-20 RX ORDER — POTASSIUM CHLORIDE 750 MG/1
TABLET, FILM COATED, EXTENDED RELEASE ORAL
Qty: 30 TABLET | Refills: 0 | Status: SHIPPED | OUTPATIENT
Start: 2018-03-20 | End: 2018-04-24

## 2018-03-20 RX ORDER — TRIAMTERENE AND HYDROCHLOROTHIAZIDE 75; 50 MG/1; MG/1
TABLET ORAL
Qty: 15 TABLET | Refills: 0 | Status: SHIPPED | OUTPATIENT
Start: 2018-03-20 | End: 2018-04-24

## 2018-03-20 NOTE — TELEPHONE ENCOUNTER
Signed Prescriptions:                        Disp   Refills    KLOR-CON 10 MEQ tablet                     30 tab*0        Sig: TAKE 1 TABLET (10 MEQ) BY MOUTH DAILY  Authorizing Provider: BIANCA LANE    No further refills until follow-up appointment

## 2018-03-20 NOTE — TELEPHONE ENCOUNTER
Signed Prescriptions:                        Disp   Refills    triamterene-hydrochlorothiazide (MAXZIDE) *15 tab*0        Sig: TAKE ONE-HALF TABLET BY MOUTH ONCE DAILY  Authorizing Provider: BIANCA LANE    No further refills until follow-up appointment

## 2018-04-02 DIAGNOSIS — E03.9 ACQUIRED HYPOTHYROIDISM: ICD-10-CM

## 2018-04-02 DIAGNOSIS — J43.9 PULMONARY EMPHYSEMA, UNSPECIFIED EMPHYSEMA TYPE (H): ICD-10-CM

## 2018-04-02 DIAGNOSIS — S06.9X0D TRAUMATIC BRAIN INJURY, WITHOUT LOSS OF CONSCIOUSNESS, SUBSEQUENT ENCOUNTER: ICD-10-CM

## 2018-04-04 RX ORDER — LEVOTHYROXINE SODIUM 25 UG/1
TABLET ORAL
Qty: 30 TABLET | Refills: 0 | Status: SHIPPED | OUTPATIENT
Start: 2018-04-04 | End: 2018-05-03

## 2018-04-04 RX ORDER — DIVALPROEX SODIUM 500 MG/1
TABLET, EXTENDED RELEASE ORAL
Qty: 60 TABLET | Refills: 0 | Status: SHIPPED | OUTPATIENT
Start: 2018-04-04 | End: 2018-05-03

## 2018-04-04 NOTE — TELEPHONE ENCOUNTER
Signed Prescriptions:                        Disp   Refills    divalproex sodium extended-release (DEPAKO*60 tab*0        Sig: TAKE 2 TABLETS (1,000 MG) BY MOUTH DAILY  Authorizing Provider: BIANCA LANE    levothyroxine (SYNTHROID/LEVOTHROID) 25 MC*30 tab*0        Sig: TAKE 1 TABLET (25 MCG) BY MOUTH DAILY  Authorizing Provider: BIANCA LANE    ADVAIR DISKUS 500-50 MCG/DOSE diskus inhal*1 Inha*0        Sig: INHALE 1 PUFF INTO THE LUNGS TWICE DAILY  Authorizing Provider: BIANCA LANE    No further refills until follow-up appointment

## 2018-04-04 NOTE — TELEPHONE ENCOUNTER
"Routing refill request to provider for review/approval because:  Failed [facility] refill protocol, see below:      Requested Prescriptions   Pending Prescriptions Disp Refills     divalproex sodium extended-release (DEPAKOTE ER) 500 MG 24 hr tablet [Pharmacy Med Name: DIVALPROEX SOD  MG TAB]  Last Written Prescription Date:  3/2/17  Last Fill Quantity: 90,  # refills: 3   Last office visit: 3/2/2017 with prescribing provider:  0    180 tablet 3     Sig: TAKE 2 TABLETS (1,000 MG) BY MOUTH DAILY    Anticonvulsants Protocol  Failed    4/2/2018  9:18 AM       Failed - Review Authorizing provider's last note.     Refer to last progress notes: confirm request is for original authorizing provider (cannot be through other providers).         Failed - Depakote level within therapeutic range in past 6 months    No results found for: VALPROIC  Depakote level must be checked 2-4 weeks after dosage change.         Failed - Normal ALT on file in past 6 months    Recent Labs   Lab Test  03/02/17   1509   ALT  78*            Failed - Normal platelet count on file in past 6 months    Recent Labs   Lab Test  12/31/15   1200   PLT  311              Failed - Recent (6 mo) or future (30 days) visit within the authorizing provider's specialty    Patient had office visit in the last 6 months or has a visit in the next 30 days with authorizing provider or within the authorizing provider's specialty.  See \"Patient Info\" tab in inbasket, or \"Choose Columns\" in Meds & Orders section of the refill encounter.            levothyroxine (SYNTHROID/LEVOTHROID) 25 MCG tablet [Pharmacy Med Name: LEVOTHYROXINE 25 MCG TABLET]  Last Written Prescription Date:  3/2/17  Last Fill Quantity: 90,  # refills: 3   Last office visit: 3/2/2017 with prescribing provider:      90 tablet 3     Sig: TAKE 1 TABLET (25 MCG) BY MOUTH DAILY    Thyroid Protocol Failed    4/2/2018  9:18 AM       Failed - Recent (12 mo) or future (30 days) visit within the authorizing " "provider's specialty    Patient had office visit in the last 12 months or has a visit in the next 30 days with authorizing provider or within the authorizing provider's specialty.  See \"Patient Info\" tab in inbasket, or \"Choose Columns\" in Meds & Orders section of the refill encounter.           Failed - Normal TSH on file in past 12 months    Recent Labs   Lab Test  03/02/17   1509   TSH  2.17             Passed - Patient is 12 years or older        ADVAIR DISKUS 500-50 MCG/DOSE diskus inhaler [Pharmacy Med Name: ADVAIR 500-50 DISKUS]  Last Written Prescription Date:  4/4/17  Last Fill Quantity: 3 inhaler,  # refills: 3   Last office visit: 3/2/2017 with prescribing provider:        3     Sig: INHALE 1 PUFF INTO THE LUNGS TWICE DAILY    Inhaled Steroids Protocol Failed    4/2/2018  9:18 AM       Failed - Asthma control assessment score within normal limits in last 6 months    Please review ACT score.          Failed - Recent (6 mo) or future (30 days) visit within the authorizing provider's specialty    Patient had office visit in the last 6 months or has a visit in the next 30 days with authorizing provider or within the authorizing provider's specialty.  See \"Patient Info\" tab in inbasket, or \"Choose Columns\" in Meds & Orders section of the refill encounter.           Passed - Patient is age 12 or older        Haritha Ellison RN - BC      "

## 2018-04-13 DIAGNOSIS — J43.9 PULMONARY EMPHYSEMA, UNSPECIFIED EMPHYSEMA TYPE (H): ICD-10-CM

## 2018-04-13 RX ORDER — MONTELUKAST SODIUM 10 MG/1
TABLET ORAL
Qty: 30 TABLET | Refills: 0 | Status: SHIPPED | OUTPATIENT
Start: 2018-04-13 | End: 2018-05-14

## 2018-04-13 NOTE — TELEPHONE ENCOUNTER
Pending Prescriptions:                       Disp   Refills    montelukast (SINGULAIR) 10 MG tablet [Pha*90 tab*0            Sig: TAKE 1 TABLET (10 MG) BY MOUTH AT BEDTIME    Routing refill request to provider for review/approval because:  Patient needs to be seen because it has been more than 1 year since last office visit.    Em Carlson RN

## 2018-04-13 NOTE — TELEPHONE ENCOUNTER
Called patient scheduled an appointment for 4/30/2018 at 11:20am for a physical.  Thank you    Anabel.

## 2018-04-24 DIAGNOSIS — I10 HYPERTENSION GOAL BP (BLOOD PRESSURE) < 150/90: ICD-10-CM

## 2018-04-25 RX ORDER — POTASSIUM CHLORIDE 750 MG/1
TABLET, FILM COATED, EXTENDED RELEASE ORAL
Qty: 30 TABLET | Refills: 0 | Status: SHIPPED | OUTPATIENT
Start: 2018-04-25 | End: 2018-05-14

## 2018-04-25 RX ORDER — TRIAMTERENE AND HYDROCHLOROTHIAZIDE 75; 50 MG/1; MG/1
TABLET ORAL
Qty: 15 TABLET | Refills: 0 | Status: SHIPPED | OUTPATIENT
Start: 2018-04-25 | End: 2018-05-14

## 2018-04-25 NOTE — TELEPHONE ENCOUNTER
Medication is being filled for 1 time refill only due to:  Patient needs to be seen because it has been more than one year since last visit.   Leann Monge RN    Pt has upcoming appt scheduled.

## 2018-05-03 DIAGNOSIS — J43.9 PULMONARY EMPHYSEMA, UNSPECIFIED EMPHYSEMA TYPE (H): ICD-10-CM

## 2018-05-03 DIAGNOSIS — E03.9 ACQUIRED HYPOTHYROIDISM: ICD-10-CM

## 2018-05-03 DIAGNOSIS — S06.9X0D TRAUMATIC BRAIN INJURY, WITHOUT LOSS OF CONSCIOUSNESS, SUBSEQUENT ENCOUNTER: ICD-10-CM

## 2018-05-03 NOTE — TELEPHONE ENCOUNTER
Called and informed the requesting pharmacy [id number] [place] the patient needs to schedule to be seen and he has not been seen in clinic since 3/2/2017. Pia Reveles,

## 2018-05-03 NOTE — TELEPHONE ENCOUNTER
Pt has not been in for OV in over a year.  Please call to schedule appt then send to PCP.  Leann Monge RN

## 2018-05-03 NOTE — TELEPHONE ENCOUNTER
Attempted to contact Gaudencio. Patient doesn't have a voicemail that is set up yet.     If Dick calls back to the clinic. Please help the patient schedule for Provider appointment. He needs to be seen to get medications filled. Last appointment was 3/2/2017Pia Reveles,

## 2018-05-07 RX ORDER — DIVALPROEX SODIUM 500 MG/1
TABLET, EXTENDED RELEASE ORAL
Qty: 60 TABLET | Refills: 0 | Status: SHIPPED | OUTPATIENT
Start: 2018-05-07 | End: 2018-05-14

## 2018-05-07 RX ORDER — LEVOTHYROXINE SODIUM 25 UG/1
TABLET ORAL
Qty: 30 TABLET | Refills: 0 | Status: SHIPPED | OUTPATIENT
Start: 2018-05-07 | End: 2018-05-14

## 2018-05-07 NOTE — TELEPHONE ENCOUNTER
Signed Prescriptions:                        Disp   Refills    divalproex sodium extended-release (DEPAKO*60 tab*0        Sig: TAKE 2 TABLETS (1,000 MG) BY MOUTH DAILY  Authorizing Provider: [address] [address]    levothyroxine (SYNTHROID/LEVOTHROID) 25 MC*30 tab*0        Sig: TAKE 1 TABLET (25 MCG) BY MOUTH DAILY  Authorizing Provider: [facility] [address]    ADVAIR DISKUS 500-50 MCG/DOSE diskus inhal*1 Inha*0        Sig: INHALE 1 PUFF INTO THE LUNGS TWICE DAILY  Authorizing Provider: [address] [address]

## 2018-05-07 NOTE — TELEPHONE ENCOUNTER
Contacted patients mother looks like they have an appointment for Tuesday 5/15/2018 for medication refills.   Hold off on refills until patient comes in maybe? Otherwise go ahead and fill them.  Mother states they will be here on Tuesday.   Thank you   Anabel

## 2018-05-14 NOTE — PATIENT INSTRUCTIONS
[facility]    If you have any questions regarding to your visit please contact your care team:       Team Red:   Clinic Hours Telephone Number   Dr. Tanya Saha, NP   7am-7pm  Monday - Thursday   7am-5pm  Fridays  [phone]  (Appointment scheduling available 24/7)    Questions about your recent visit?   Team Line  [phone]   Urgent Care - [place] and [facility] - 11am-9pm Monday-Friday Saturday-Sunday- 9am-5pm   [place] - 5pm-9pm Monday-Friday Saturday-Sunday- 9am-5pm  [phone] - [place]  [phone] - [place]       What options do I have for a visit other than an office visit? We offer electronic visits (e-visits) and telephone visits, when medically appropriate.  Please check with your medical insurance to see if these types of visits are covered, as you will be responsible for any charges that are not paid by your insurance.      You can use [employer] (secure electronic communication) to access to your chart, send your primary care provider a message, or make an appointment. Ask a team member how to get started.     For a price quote for your services, please call our Consumer Price Line at [phone] or our Imaging Cost estimation line at [phone] (for imaging tests).      It is recommended that you get a vaccination for shingles called Shingrix (given as 2 shots, 1 to 6 months apart), even if you have already had the Zostavax vaccine. Discuss getting the Shingix vaccine from your pharmacist, or schedule an ancillary shot visit here. Some insurances do not cover the cost of these vaccines.      Discharged By:  BRIDGER SLOAN

## 2018-05-14 NOTE — PROGRESS NOTES
"  SUBJECTIVE:   Gaudencio Avila is a 68 year old male who presents to clinic today for the following health issues:    Hyperlipidemia Follow-Up      Rate your low fat/cholesterol diet?: not monitoring fat    Taking statin?  No    Other lipid medications/supplements?:  none    Hypertension Follow-up      Outpatient blood pressures are not being checked.    Low Salt Diet: not monitoring salt    COPD Follow-Up    Symptoms are currently: stable    Current fatigue or dyspnea with ambulation: none    Shortness of breath: stable    Increased or change in Cough/Sputum: No    Fever(s): No    Any ER/UC or hospital admissions since your last visit? No     History   Smoking Status     Former Smoker     Packs/day: 0.50     Years: 4.00     Types: Cigarettes     Quit date: 1/1/1970   Smokeless Tobacco     Never Used     Lab Results   Component Value Date    FEV1 26 05/24/2011       Hypothyroidism Follow-up      Since last visit, patient describes the following symptoms: Weight stable, no hair loss, no skin changes, no constipation, no loose stools      Amount of exercise or physical activity: None    Problems taking medications regularly: No    Medication side effects: none    Diet: regular (no restrictions)    He lives with his mother since a remote TBI.     I have reviewed the patient's medical history in detail and updated the computerized patient record.     ROS:  CONSTITUTIONAL: NEGATIVE for fever, chills, change in weight  INTEGUMENTARY/SKIN: NEGATIVE for worrisome rashes, moles or lesions  EYES: NEGATIVE for vision changes or irritation  RESP: NEGATIVE for significant cough or SOB  CV: NEGATIVE for chest pain, palpitations or peripheral edema  MUSCULOSKELETAL: NEGATIVE for significant arthralgias or myalgia  NEURO: remote TBI   HEME: NEGATIVE for bleeding problems  PSYCHIATRIC: NEGATIVE for changes in mood or affect    OBJECTIVE:     /86  Pulse 60  Temp 96  F (35.6  C) (Oral)  Resp 17  Ht 5' 4\" (1.626 m)  Wt 203 " lb 6.4 oz (92.3 kg)  SpO2 95%  BMI 34.91 kg/m2  Body mass index is 34.91 kg/(m^2).  GENERAL: alert, no distress and obese  NECK: no adenopathy, no asymmetry, masses, or scars and thyroid normal to palpation  RESP: lungs clear to auscultation - no rales, rhonchi or wheezes  CV: regular rate and rhythm, normal S1 S2, no S3 or S4, no murmur, click or rub   MS: no gross musculoskeletal defects noted, no edema  NEURO: sparse speech   PSYCH: affect normal/bright    Diagnostic Test Results:  Results for orders placed or performed in visit on 03/02/17   US Aorta Medicare AAA Screening    Narrative    US AORTA MEDICARE AAA SCREENING  3/21/2017 10:01 AM     HISTORY:  Screening for abdominal aortic aneurysm.    COMPARISON: None.    FINDINGS: There is no evidence for an abdominal aortic aneurysm.  Maximum diameter of the abdominal aorta is 2.7 cm.    Based on the measurements provided, the common iliac arteries are  ectatic measuring 1.9 cm in diameter bilaterally.    JESSICA OLIVERA MD   COMPREHENSIVE METABOLIC PANEL   Result Value Ref Range    Sodium 140 133 - 144 mmol/L    Potassium 4.4 3.4 - 5.3 mmol/L    Chloride 104 94 - 109 mmol/L    Carbon Dioxide 24 20 - 32 mmol/L    Anion Gap 12 3 - 14 mmol/L    Glucose 71 70 - 99 mg/dL    Urea Nitrogen 16 7 - 30 mg/dL    Creatinine 0.70 0.66 - 1.25 mg/dL    GFR Estimate >90  Non  GFR Calc   >60 mL/min/1.7m2    GFR Estimate If Black >90   GFR Calc   >60 mL/min/1.7m2    Calcium 8.9 8.5 - 10.1 mg/dL    Bilirubin Total 0.4 0.2 - 1.3 mg/dL    Albumin 3.2 (L) 3.4 - 5.0 g/dL    Protein Total 8.6 6.8 - 8.8 g/dL    Alkaline Phosphatase 51 40 - 150 U/L    ALT 78 (H) 0 - 70 U/L    AST 44 0 - 45 U/L   Lipid panel reflex to direct LDL   Result Value Ref Range    Cholesterol 130 <200 mg/dL    Triglycerides 134 <150 mg/dL    HDL Cholesterol 28 (L) >39 mg/dL    LDL Cholesterol Calculated 75 <100 mg/dL    Non HDL Cholesterol 102 <130 mg/dL   TSH WITH FREE T4 REFLEX    Result Value Ref Range    TSH 2.17 0.40 - 4.00 mU/L   Hepatitis C RNA, quantitative   Result Value Ref Range    HCV RNA Quant IU/ml 38193 (A) HCVND [IU]/mL    Log of HCV RNA Qt 4.7 (H) <1.2 Log IU/mL       ASSESSMENT/PLAN:   (J43.9) Pulmonary emphysema, unspecified emphysema type (H)  (primary encounter diagnosis)  Comment: severe; Well controlled with medications without side effects.   Plan: montelukast (SINGULAIR) 10 MG tablet,         tiotropium (SPIRIVA HANDIHALER) 18 MCG capsule          (S06.9X0D) Traumatic brain injury, without loss of consciousness, subsequent encounter  Plan: divalproex sodium extended-release (DEPAKOTE         ER) 500 MG 24 hr tablet          (I10) Hypertension goal BP (blood pressure) < 150/90  Comment: Well controlled with medications without side effects.   Plan: potassium chloride (KLOR-CON) 10 MEQ tablet,         triamterene-hydrochlorothiazide (MAXZIDE) 75-50        MG per tablet, COMPREHENSIVE METABOLIC PANEL          (E78.5) Hyperlipidemia LDL goal <130  Plan: COMPREHENSIVE METABOLIC PANEL, Lipid panel         reflex to direct LDL Fasting          (E03.9) Acquired hypothyroidism  Comment: euthyroid on replacement   Plan: levothyroxine (SYNTHROID/LEVOTHROID) 25 MCG         tablet, TSH WITH FREE T4 REFLEX          (Z12.11) Screen for colon cancer  Plan: GASTROENTEROLOGY ADULT REF PROCEDURE ONLY         Other; [facility] [phone]            See Patient Instructions    Tanya Renee MD  [facility]

## 2018-05-15 ENCOUNTER — OFFICE VISIT (OUTPATIENT)
Dept: FAMILY MEDICINE | Facility: CLINIC | Age: 68
End: 2018-05-15
Payer: COMMERCIAL

## 2018-05-15 VITALS
DIASTOLIC BLOOD PRESSURE: 86 MMHG | RESPIRATION RATE: 17 BRPM | TEMPERATURE: 96 F | BODY MASS INDEX: 34.72 KG/M2 | HEIGHT: 64 IN | HEART RATE: 60 BPM | WEIGHT: 203.4 LBS | SYSTOLIC BLOOD PRESSURE: 128 MMHG | OXYGEN SATURATION: 95 %

## 2018-05-15 DIAGNOSIS — S06.9X0D TRAUMATIC BRAIN INJURY, WITHOUT LOSS OF CONSCIOUSNESS, SUBSEQUENT ENCOUNTER: ICD-10-CM

## 2018-05-15 DIAGNOSIS — E78.5 HYPERLIPIDEMIA LDL GOAL <130: ICD-10-CM

## 2018-05-15 DIAGNOSIS — I10 HYPERTENSION GOAL BP (BLOOD PRESSURE) < 150/90: ICD-10-CM

## 2018-05-15 DIAGNOSIS — J43.9 PULMONARY EMPHYSEMA, UNSPECIFIED EMPHYSEMA TYPE (H): Primary | ICD-10-CM

## 2018-05-15 DIAGNOSIS — J43.9 PULMONARY EMPHYSEMA, UNSPECIFIED EMPHYSEMA TYPE (H): ICD-10-CM

## 2018-05-15 DIAGNOSIS — E03.9 ACQUIRED HYPOTHYROIDISM: ICD-10-CM

## 2018-05-15 DIAGNOSIS — Z12.11 SCREEN FOR COLON CANCER: ICD-10-CM

## 2018-05-15 LAB
ALBUMIN SERPL-MCNC: 3.3 G/DL (ref 3.4–5)
ALP SERPL-CCNC: 45 U/L (ref 40–150)
ALT SERPL W P-5'-P-CCNC: 22 U/L (ref 0–70)
ANION GAP SERPL CALCULATED.3IONS-SCNC: 10 MMOL/L (ref 3–14)
AST SERPL W P-5'-P-CCNC: 20 U/L (ref 0–45)
BILIRUB SERPL-MCNC: 0.4 MG/DL (ref 0.2–1.3)
BUN SERPL-MCNC: 22 MG/DL (ref 7–30)
CALCIUM SERPL-MCNC: 9.2 MG/DL (ref 8.5–10.1)
CHLORIDE SERPL-SCNC: 104 MMOL/L (ref 94–109)
CHOLEST SERPL-MCNC: 196 MG/DL
CO2 SERPL-SCNC: 26 MMOL/L (ref 20–32)
CREAT SERPL-MCNC: 0.83 MG/DL (ref 0.66–1.25)
GFR SERPL CREATININE-BSD FRML MDRD: >90 ML/MIN/1.7M2
GLUCOSE SERPL-MCNC: 90 MG/DL (ref 70–99)
HDLC SERPL-MCNC: 31 MG/DL
LDLC SERPL CALC-MCNC: 125 MG/DL
NONHDLC SERPL-MCNC: 165 MG/DL
POTASSIUM SERPL-SCNC: 4 MMOL/L (ref 3.4–5.3)
PROT SERPL-MCNC: 8.3 G/DL (ref 6.8–8.8)
SODIUM SERPL-SCNC: 140 MMOL/L (ref 133–144)
TRIGL SERPL-MCNC: 199 MG/DL
TSH SERPL DL<=0.005 MIU/L-ACNC: 3.66 MU/L (ref 0.4–4)

## 2018-05-15 PROCEDURE — 36415 COLL VENOUS BLD VENIPUNCTURE: CPT | Performed by: FAMILY MEDICINE

## 2018-05-15 PROCEDURE — 80061 LIPID PANEL: CPT | Performed by: FAMILY MEDICINE

## 2018-05-15 PROCEDURE — 80053 COMPREHEN METABOLIC PANEL: CPT | Performed by: FAMILY MEDICINE

## 2018-05-15 PROCEDURE — 99214 OFFICE O/P EST MOD 30 MIN: CPT | Performed by: FAMILY MEDICINE

## 2018-05-15 PROCEDURE — 84443 ASSAY THYROID STIM HORMONE: CPT | Performed by: FAMILY MEDICINE

## 2018-05-15 RX ORDER — DIVALPROEX SODIUM 500 MG/1
TABLET, EXTENDED RELEASE ORAL
Qty: 180 TABLET | Refills: 3 | Status: SHIPPED | OUTPATIENT
Start: 2018-05-15 | End: 2018-09-20

## 2018-05-15 RX ORDER — MONTELUKAST SODIUM 10 MG/1
TABLET ORAL
Qty: 90 TABLET | Refills: 3 | Status: SHIPPED | OUTPATIENT
Start: 2018-05-15 | End: 2018-06-05

## 2018-05-15 RX ORDER — POTASSIUM CHLORIDE 750 MG/1
10 TABLET, EXTENDED RELEASE ORAL DAILY
Qty: 90 TABLET | Refills: 3 | Status: SHIPPED | OUTPATIENT
Start: 2018-05-15 | End: 2018-09-20

## 2018-05-15 RX ORDER — TRIAMTERENE AND HYDROCHLOROTHIAZIDE 75; 50 MG/1; MG/1
TABLET ORAL
Qty: 45 TABLET | Refills: 3 | Status: SHIPPED | OUTPATIENT
Start: 2018-05-15 | End: 2018-09-20

## 2018-05-15 RX ORDER — TIOTROPIUM BROMIDE 18 UG/1
18 CAPSULE ORAL; RESPIRATORY (INHALATION) DAILY
Qty: 90 CAPSULE | Refills: 3 | Status: SHIPPED | OUTPATIENT
Start: 2018-05-15 | End: 2018-09-24 | Stop reason: ALTCHOICE

## 2018-05-15 RX ORDER — MONTELUKAST SODIUM 10 MG/1
TABLET ORAL
Qty: 30 TABLET | Refills: 0 | OUTPATIENT
Start: 2018-05-15

## 2018-05-15 RX ORDER — LEVOTHYROXINE SODIUM 25 UG/1
TABLET ORAL
Qty: 90 TABLET | Refills: 3 | Status: SHIPPED | OUTPATIENT
Start: 2018-05-15 | End: 2018-09-20

## 2018-05-15 NOTE — MR AVS SNAPSHOT
After Visit Summary   5/15/2018    Gaudencio Avila    MRN: [id number]           Patient Information     Date Of Birth          1950        Visit Information        Provider Department      5/15/2018 11:20 AM Tanya Renee MD [facility]        Today's Diagnoses     Pulmonary emphysema, unspecified emphysema type (H)    -  1    Traumatic brain injury, without loss of consciousness, subsequent encounter        Hypertension goal BP (blood pressure) < 150/90        Hyperlipidemia LDL goal <130        Acquired hypothyroidism        Screen for colon cancer          Care Instructions    [facility]    If you have any questions regarding to your visit please contact your care team:       Team Red:   Clinic Hours Telephone Number   Dr. Tanya Saha, NP   7am-7pm  Monday - Thursday   7am-5pm  Fridays  [phone]  (Appointment scheduling available 24/7)    Questions about your recent visit?   Team Line  [phone]   Urgent Care - [place] [facility] - 11am-9pm Monday-Friday Saturday-Sunday- 9am-5pm   [place] - 5pm-9pm Monday-Friday Saturday-Sunday- 9am-5pm  [phone] - [place]  [phone] - [place]       What options do I have for a visit other than an office visit? We offer electronic visits (e-visits) and telephone visits, when medically appropriate.  Please check with your medical insurance to see if these types of visits are covered, as you will be responsible for any charges that are not paid by your insurance.      You can use [employer] (secure electronic communication) to access to your chart, send your primary care provider a message, or make an appointment. Ask a team member how to get started.     For a price quote for your services, please call our Consumer Price Line at [phone] or our Imaging Cost estimation line at [phone] (for imaging tests).      It is recommended that  you get a vaccination for shingles called Shingrix (given as 2 shots, 1 to 6 months apart), even if you have already had the Zostavax vaccine. Discuss getting the Shingix vaccine from your pharmacist, or schedule an ancillary shot visit here. Some insurances do not cover the cost of these vaccines.      Discharged By:  BRIDGER SLOAN            Follow-ups after your visit        Additional Services     GASTROENTEROLOGY ADULT REF PROCEDURE ONLY Other; [facility] [phone]       Last Lab Result: Creatinine (mg/dL)       Date                     Value                 03/02/2017               0.70             ----------  Body mass index is 34.91 kg/(m^2).     Needed:  No  Language:  English    Patient will be contacted to schedule procedure.     Please be aware that coverage of these services is subject to the terms and limitations of your health insurance plan.  Call member services at your health plan with any benefit or coverage questions.  Any procedures must be performed at a [facility] facility OR coordinated by your clinic's referral office.    Please bring the following with you to your appointment:    (1) Any X-Rays, CTs or MRIs which have been performed.  Contact the facility where they were done to arrange for  prior to your scheduled appointment.    (2) List of current medications   (3) This referral request   (4) Any documents/labs given to you for this referral                  Follow-up notes from your care team     Return in about 1 year (around 5/15/2019), or if symptoms worsen or fail to improve, for Wellness visit (fasting labs up to one week prior).      Your next 10 appointments already scheduled     Jun 05, 2018  9:00 AM CDT   PHYSICAL with Tanya Renee MD   [facility] [place] ([facility] [place])    [address]  [place] [address] [phone]              Who to contact     If you have questions or need follow up information about today's clinic visit or your  "schedule please contact [facility] [place] directly at [phone].  Normal or non-critical lab and imaging results will be communicated to you by MyChart, letter or phone within 4 business days after the clinic has received the results. If you do not hear from us within 7 days, please contact the clinic through MyChart or phone. If you have a critical or abnormal lab result, we will notify you by phone as soon as possible.  Submit refill requests through [employer] or call your pharmacy and they will forward the refill request to us. Please allow 3 business days for your refill to be completed.          Additional Information About Your Visit        Care EveryWhere ID     This is your Care EveryWhere ID. This could be used by other organizations to access your [facility] medical records  [id number]        Your Vitals Were     Pulse Temperature Respirations Height Pulse Oximetry BMI (Body Mass Index)    60 96  F (35.6  C) (Oral) 17 5' 4\" (1.626 m) 95% 34.91 kg/m2       Blood Pressure from Last 3 Encounters:   05/15/18 128/86   03/02/17 128/84   02/10/16 135/89    Weight from Last 3 Encounters:   05/15/18 203 lb 6.4 oz (92.3 kg)   03/02/17 203 lb (92.1 kg)   02/10/16 188 lb (85.3 kg)              We Performed the Following     COMPREHENSIVE METABOLIC PANEL     GASTROENTEROLOGY ADULT REF PROCEDURE ONLY Other; [facility] [phone]     Lipid panel reflex to direct LDL Fasting     TSH WITH FREE T4 REFLEX          Today's Medication Changes          These changes are accurate as of 5/15/18 12:04 PM.  If you have any questions, ask your nurse or doctor.               These medicines have changed or have updated prescriptions.        Dose/Directions    divalproex sodium extended-release 500 MG 24 hr tablet   Commonly known as:  DEPAKOTE ER   This may have changed:  See the new instructions.   Used for:  Traumatic brain injury, without loss of consciousness, subsequent encounter   Changed by:  Tanya Renee MD        " TAKE 2 TABLETS (1,000 MG) BY MOUTH DAILY   Quantity:  180 tablet   Refills:  3       levothyroxine 25 MCG tablet   Commonly known as:  SYNTHROID/LEVOTHROID   This may have changed:  See the new instructions.   Used for:  Acquired hypothyroidism   Changed by:  Tanya Renee MD        TAKE 1 TABLET (25 MCG) BY MOUTH DAILY   Quantity:  90 tablet   Refills:  3       montelukast 10 MG tablet   Commonly known as:  SINGULAIR   This may have changed:  See the new instructions.   Used for:  Pulmonary emphysema, unspecified emphysema type (H)   Changed by:  Tanya Renee MD        TAKE 1 TABLET (10 MG) BY MOUTH AT BEDTIME   Quantity:  90 tablet   Refills:  3       potassium chloride 10 MEQ tablet   Commonly known as:  KLOR-CON   This may have changed:  See the new instructions.   Used for:  Hypertension goal BP (blood pressure) < 150/90   Changed by:  Tanya Renee MD        Dose:  10 mEq   Take 1 tablet (10 mEq) by mouth daily   Quantity:  90 tablet   Refills:  3       tiotropium 18 MCG capsule   Commonly known as:  SPIRIVA HANDIHALER   This may have changed:  additional instructions   Used for:  Pulmonary emphysema, unspecified emphysema type (H)   Changed by:  Tanya Renee MD        Dose:  18 mcg   Inhale 1 capsule (18 mcg) into the lungs daily   Quantity:  90 capsule   Refills:  3       triamterene-hydrochlorothiazide 75-50 MG per tablet   Commonly known as:  MAXZIDE   This may have changed:  See the new instructions.   Used for:  Hypertension goal BP (blood pressure) < 150/90   Changed by:  Tanya Renee MD        TAKE ONE-HALF TABLET BY MOUTH ONCE DAILY   Quantity:  45 tablet   Refills:  3            Where to get your medicines      These medications were sent to [id number] - [place], [address] [place] [address], [place] [address]     Phone:  [phone]     divalproex sodium extended-release 500 MG 24 hr tablet    levothyroxine 25 MCG  tablet    montelukast 10 MG tablet    potassium chloride 10 MEQ tablet    tiotropium 18 MCG capsule    triamterene-hydrochlorothiazide 75-50 MG per tablet                Primary Care Provider Office Phone # Fax #    Tanya Renee [phone] [address]  [place] [address]        Goals        General    Medical (pt-stated)     Notes - Note created  12/18/2015 12:27 PM by Pooja Campbell RN    Patient/mom will state understanding of S/S of worsening respiratory status and appropriate interventions, such as, calling clinic or seeking emergency care.       Medical (pt-stated)     Notes - Note created  12/18/2015 12:28 PM by Pooja Campbell RN    Patient/mom will state understanding regarding interventions to use to maintain stable respiratory status, such as, appropriate nutrition and hydration, appropriate medication/inhaler/nebulizer use.          Equal Access to Services     [facility]: Hadcate Larose, wadonny holliday, antonio traorealluna corea, alpesh torres . So [facility] [phone].    ATENCIÓN: Si habla español, tiene a wang disposición servicios gratuitos de asistencia lingüística. Llame al [phone].    We comply with applicable federal civil rights laws and [place] laws. We do not discriminate on the basis of race, color, national origin, age, disability, sex, sexual orientation, or gender identity.            Thank you!     Thank you for choosing [facility]  for your care. Our goal is always to provide you with excellent care. Hearing back from our patients is one way we can continue to improve our services. Please take a few minutes to complete the written survey that you may receive in the mail after your visit with us. Thank you!             Your Updated Medication List - Protect others around you: Learn how to safely use, store and throw away your medicines at www.disposemymeds.org.          This list is accurate  as of 5/15/18 12:04 PM.  Always use your most recent med list.                   Brand Name Dispense Instructions for use Diagnosis    ADVAIR DISKUS 500-50 MCG/DOSE diskus inhaler   Generic drug:  fluticasone-salmeterol     1 Inhaler    INHALE 1 PUFF INTO THE LUNGS TWICE DAILY    Pulmonary emphysema, unspecified emphysema type (H)       * albuterol (2.5 MG/3ML) 0.083% neb solution     225 mL    TAKE 3 MLS BY NEBULIZATION EVERY 4 HOURS AS NEEDED    Pulmonary emphysema, unspecified emphysema type (H)       * albuterol 108 (90 Base) MCG/ACT Inhaler    PROAIR HFA    1 Inhaler    Inhale 1-2 puffs into the lungs every 4 hours as needed    Pulmonary emphysema, unspecified emphysema type (H)       aspirin 81 MG tablet      Take 1 tablet by mouth daily.        atenolol 25 MG tablet    TENORMIN    90 tablet    Take 1 tablet (25 mg) by mouth daily    Hypertension goal BP (blood pressure) < 150/90       cholecalciferol 1000 UNIT tablet    vitamin D3     Take 1 tablet (1,000 Units) by mouth daily        divalproex sodium extended-release 500 MG 24 hr tablet    DEPAKOTE ER    180 tablet    TAKE 2 TABLETS (1,000 MG) BY MOUTH DAILY    Traumatic brain injury, without loss of consciousness, subsequent encounter       levothyroxine 25 MCG tablet    SYNTHROID/LEVOTHROID    90 tablet    TAKE 1 TABLET (25 MCG) BY MOUTH DAILY    Acquired hypothyroidism       montelukast 10 MG tablet    SINGULAIR    90 tablet    TAKE 1 TABLET (10 MG) BY MOUTH AT BEDTIME    Pulmonary emphysema, unspecified emphysema type (H)       omeprazole 20 MG CR capsule    priLOSEC    90 capsule    Take 1 capsule (20 mg) by mouth daily    Gastroesophageal reflux disease without esophagitis       potassium chloride 10 MEQ tablet    KLOR-CON    90 tablet    Take 1 tablet (10 mEq) by mouth daily    Hypertension goal BP (blood pressure) < 150/90       tiotropium 18 MCG capsule    SPIRIVA HANDIHALER    90 capsule    Inhale 1 capsule (18 mcg) into the lungs daily     Pulmonary emphysema, unspecified emphysema type (H)       triamterene-hydrochlorothiazide 75-50 MG per tablet    MAXZIDE    45 tablet    TAKE ONE-HALF TABLET BY MOUTH ONCE DAILY    Hypertension goal BP (blood pressure) < 150/90       * Notice:  This list has 2 medication(s) that are the same as other medications prescribed for you. Read the directions carefully, and ask your doctor or other care provider to review them with you.

## 2018-05-15 NOTE — LETTER
My COPD Action Plan     Name: Gaudencio Avila    YOB: 1950   Date: 5/15/2018    My doctor: Tanya Renee MD   My clinic: [address] [id number] [phone]  My Controller Medicine: Tiotropium (Spiriva)  Serevent/Fluticasone (Advair)   Dose:      My Rescue Medicine: Albuterol (Proair/Ventolin/Proventil) inhaler   Dose:       My Flare Up Medicine: Prednisone   Dose:   FEV-1 (no units)   Date Value   05/24/2011 26      My COPD Severity: Very Severe = FeV1 < 30 %      Use of Oxygen: Oxygen Not Prescribed      Make sure you've had your pneumonia   vaccines.          GREEN ZONE       Doing well today      Usual level of activity and exercise    Usual amount of cough and mucus    No shortness of breath    Usual level of health (thinking clearly, sleeping well, feel like eating) Actions:      Take daily medicines    Use oxygen as prescribed    Follow regular exercise and diet plan    Avoid cigarette smoke and other irritants that harm the lungs           YELLOW ZONE          Having a bad day or flare up      Short of breath more than usual    A lot more sputum (mucus) than usual    Sputum looks yellow, green, tan, brown or bloody    More coughing or wheezing    Fever or chills    Less energy; trouble completing activities    Trouble thinking or focusing    Using quick relief inhaler or nebulizer more often    Poor sleep; symptoms wake me up    Do not feel like eating Actions:      Get plenty of rest    Take daily medicines    Use quick relief inhaler every 4 hours    If you use oxygen, call you doctor to see if you should adjust your oxygen    Do breathing exercises or other things to help you relax    Let a loved one, friend or neighbor know you are feeling worse    Call your care team if you have 2 or more symptoms.  Start taking steroids or antibiotics if directed by your care team           RED ZONE       Need medical care now      Severe shortness  of breath (feel you can't breathe)    Fever, chills    Not enough breath to do any activity    Trouble coughing up mucus, walking or talking    Blood in mucus    Frequent coughing   Rescue medicines are not working    Not able to sleep because of breathing    Feel confused or drowsy    Chest pain    Actions:      Call your health care team.  If you cannot reach your care team, call 911 or go to the emergency room.        Annual Reminders:  Meet with Care Team, Flu Shot every Fall  [id number] [place] [address] [facility]

## 2018-05-15 NOTE — LETTER
[facility]  [address] [place] [address]    May 16, 2018    Gaudencio Avila  [address] [facility] [address]      Dear Gaudencio,    Your thyroid, liver, kidney and blood glucose tests are normal. Your cholesterol is higher again. Eat healthy foods like fruits, vegetables, chicken, fish, nuts, and low fat yogurt. Drink water and milk instead of pop and juice. Avoid sweets. Exercise regularly. Let's discuss a possible cholesterol medication at your upcoming appointment.     Enclosed is a copy of your results.   Results for orders placed or performed in visit on 05/15/18   COMPREHENSIVE METABOLIC PANEL   Result Value Ref Range    Sodium 140 133 - 144 mmol/L    Potassium 4.0 3.4 - 5.3 mmol/L    Chloride 104 94 - 109 mmol/L    Carbon Dioxide 26 20 - 32 mmol/L    Anion Gap 10 3 - 14 mmol/L    Glucose 90 70 - 99 mg/dL    Urea Nitrogen 22 7 - 30 mg/dL    Creatinine 0.83 0.66 - 1.25 mg/dL    GFR Estimate >90 >60 mL/min/1.7m2    GFR Estimate If Black >90 >60 mL/min/1.7m2    Calcium 9.2 8.5 - 10.1 mg/dL    Bilirubin Total 0.4 0.2 - 1.3 mg/dL    Albumin 3.3 (L) 3.4 - 5.0 g/dL    Protein Total 8.3 6.8 - 8.8 g/dL    Alkaline Phosphatase 45 40 - 150 U/L    ALT 22 0 - 70 U/L    AST 20 0 - 45 U/L   Lipid panel reflex to direct LDL Fasting   Result Value Ref Range    Cholesterol 196 <200 mg/dL    Triglycerides 199 (H) <150 mg/dL    HDL Cholesterol 31 (L) >39 mg/dL    LDL Cholesterol Calculated 125 (H) <100 mg/dL    Non HDL Cholesterol 165 (H) <130 mg/dL   TSH WITH FREE T4 REFLEX   Result Value Ref Range    TSH 3.66 0.40 - 4.00 mU/L       If you have any questions or concerns, please call myself or my nurse at [phone].    Sincerely,    Tanya Renee MD/shelli

## 2018-05-15 NOTE — TELEPHONE ENCOUNTER
Refused Prescriptions:                       Disp   Refills    montelukast (SINGULAIR) 10 MG tablet [Phar*30 tab*0        Sig: TAKE 1 TABLET (10 MG) BY MOUTH AT BEDTIME  Refused By: YOLANDA CAMPOS  Reason for Refusal: Patient has already picked up prescription  Reason for Refusal Comment: Office visit today, patient picked up rx from [facility]      Spoke with pharmacy and confirmed that they received prescription from office visit today with Dr. Renee. Refill request refused.    Yolanda Campos RN

## 2018-05-16 NOTE — PROGRESS NOTES
Mail letter:  Your thyroid, liver, kidney and blood glucose tests are normal. Your cholesterol is higher again. Eat healthy foods like fruits, vegetables, chicken, fish, nuts, and low fat yogurt. Drink water and milk instead of pop and juice. Avoid sweets. Exercise regularly. Let's discuss a possible cholesterol medication at your upcoming appointment.     Tanya Renee MD    The 10-year ASCVD risk score (Lehryuval MONTILLA Jr, et al., 2013) is: 23.9%    Values used to calculate the score:      Age: 68 years      Sex: Male      Is Non- : No      Diabetic: No      Tobacco smoker: No      Systolic Blood Pressure: 128 mmHg      Is BP treated: Yes      HDL Cholesterol: 31 mg/dL      Total Cholesterol: 196 mg/dL

## 2018-06-03 DIAGNOSIS — J43.9 PULMONARY EMPHYSEMA, UNSPECIFIED EMPHYSEMA TYPE (H): ICD-10-CM

## 2018-06-04 RX ORDER — MONTELUKAST SODIUM 10 MG/1
TABLET ORAL
Qty: 90 TABLET | Refills: 0 | OUTPATIENT
Start: 2018-06-04

## 2018-06-04 NOTE — TELEPHONE ENCOUNTER
Refused Prescriptions:                       Disp   Refills    montelukast (SINGULAIR) 10 MG tablet [Phar*90 tab*0        Sig: TAKE 1 TABLET (10 MG) BY MOUTH AT BEDTIME  Refused By: SIRENA GUZMAN  Reason for Refusal: Duplicate    Sirena Guzman RN on 6/4/2018 at 3:29 PM

## 2018-06-05 ENCOUNTER — OFFICE VISIT (OUTPATIENT)
Dept: FAMILY MEDICINE | Facility: CLINIC | Age: 68
End: 2018-06-05
Payer: COMMERCIAL

## 2018-06-05 VITALS
BODY MASS INDEX: 34.66 KG/M2 | TEMPERATURE: 97.6 F | HEART RATE: 82 BPM | RESPIRATION RATE: 24 BRPM | SYSTOLIC BLOOD PRESSURE: 146 MMHG | DIASTOLIC BLOOD PRESSURE: 96 MMHG | OXYGEN SATURATION: 93 % | HEIGHT: 64 IN | WEIGHT: 203 LBS

## 2018-06-05 DIAGNOSIS — E78.5 HYPERLIPIDEMIA LDL GOAL <130: ICD-10-CM

## 2018-06-05 DIAGNOSIS — E03.9 ACQUIRED HYPOTHYROIDISM: ICD-10-CM

## 2018-06-05 DIAGNOSIS — Z00.00 ROUTINE HISTORY AND PHYSICAL EXAMINATION OF ADULT: Primary | ICD-10-CM

## 2018-06-05 DIAGNOSIS — J43.9 PULMONARY EMPHYSEMA, UNSPECIFIED EMPHYSEMA TYPE (H): ICD-10-CM

## 2018-06-05 DIAGNOSIS — H93.8X2 EAR CANAL MASS, LEFT: ICD-10-CM

## 2018-06-05 DIAGNOSIS — I10 HYPERTENSION GOAL BP (BLOOD PRESSURE) < 150/90: ICD-10-CM

## 2018-06-05 DIAGNOSIS — S06.9X0D TRAUMATIC BRAIN INJURY, WITHOUT LOSS OF CONSCIOUSNESS, SUBSEQUENT ENCOUNTER: ICD-10-CM

## 2018-06-05 DIAGNOSIS — Z12.11 SPECIAL SCREENING FOR MALIGNANT NEOPLASMS, COLON: ICD-10-CM

## 2018-06-05 DIAGNOSIS — H01.003 BLEPHARITIS OF EYELID OF RIGHT EYE, UNSPECIFIED EYELID, UNSPECIFIED TYPE: ICD-10-CM

## 2018-06-05 PROCEDURE — G0438 PPPS, INITIAL VISIT: HCPCS | Performed by: FAMILY MEDICINE

## 2018-06-05 RX ORDER — ATENOLOL 25 MG/1
25 TABLET ORAL DAILY
Qty: 90 TABLET | Refills: 3 | Status: CANCELLED | OUTPATIENT
Start: 2018-06-05

## 2018-06-05 RX ORDER — ATORVASTATIN CALCIUM 10 MG/1
10 TABLET, FILM COATED ORAL DAILY
Qty: 90 TABLET | Refills: 3 | Status: SHIPPED | OUTPATIENT
Start: 2018-06-05 | End: 2018-09-20

## 2018-06-05 RX ORDER — MONTELUKAST SODIUM 10 MG/1
TABLET ORAL
Qty: 90 TABLET | Refills: 3 | Status: CANCELLED | OUTPATIENT
Start: 2018-06-05

## 2018-06-05 RX ORDER — ATENOLOL 50 MG/1
50 TABLET ORAL DAILY
Qty: 30 TABLET | Refills: 0 | Status: SHIPPED | OUTPATIENT
Start: 2018-06-05 | End: 2018-07-09

## 2018-06-05 RX ORDER — POLYMYXIN B SULFATE AND TRIMETHOPRIM 1; 10000 MG/ML; [USP'U]/ML
1 SOLUTION OPHTHALMIC 3 TIMES DAILY
Qty: 2 ML | Refills: 0 | Status: SHIPPED | OUTPATIENT
Start: 2018-06-05 | End: 2018-06-12

## 2018-06-05 ASSESSMENT — ACTIVITIES OF DAILY LIVING (ADL)
CURRENT_FUNCTION: NO ASSISTANCE NEEDED
I_NEED_ASSISTANCE_FOR_THE_FOLLOWING_DAILY_ACTIVITIES:: NO ASSISTANCE IS NEEDED

## 2018-06-05 NOTE — MR AVS SNAPSHOT
After Visit Summary   6/5/2018    Gaudencio Avila    MRN: [id number]           Patient Information     Date Of Birth          1950        Visit Information        Provider Department      6/5/2018 9:00 AM Tanya Renee MD [facility]        Today's Diagnoses     Routine history and physical examination of adult    -  1    Traumatic brain injury, without loss of consciousness, subsequent encounter        Pulmonary emphysema, unspecified emphysema type (H)        Ear canal mass, left        Blepharitis of eyelid of right eye, unspecified eyelid, unspecified type        Hypertension goal BP (blood pressure) < 150/90        Hyperlipidemia LDL goal <130        Acquired hypothyroidism        Special screening for malignant neoplasms, colon          Care Instructions    You can stop taking montelukast.    It is recommended that you get a vaccination for shingles called Shingrix (given as 2 shots, 2 to 6 months apart), even if you have already had the Zostavax vaccine. Discuss getting the Shingix vaccine from your pharmacist, or schedule an ancillary shot visit here. Some insurances do not cover the cost of these vaccines.      Did you know you can lower your blood pressure with your daily habits?    *Losing 20 pounds of weight lowers blood pressure 5 to 20 points.  *Eating a low-fat diet rich in fruits, vegetables and low-fat dairy lowers blood pressure 8 to 14 points.  *Eating a low-salt diet lowers blood pressure 2 to 8 points.  *Exercising regularly lowers blood pressure 4 to 9 points.  *Reducing alcohol use lowers blood pressure 2 to 4 points.    [facility] [place]    If you have any questions regarding to your visit please contact your care team:       Team Red:   Clinic Hours Telephone Number   Dr. Tanya Saha, NP   7am-7pm  Monday - Thursday   7am-5pm  Fridays  [phone]  (Appointment scheduling available  24/7)    Questions about your recent visit?   Team Line  [phone]   Urgent Care - [place] and [place] Brenda Sanchez - 11am-9pm Monday-Friday Saturday-Sunday- 9am-5pm   [place] - 5pm-9pm Monday-Friday Saturday-Sunday- 9am-5pm  [phone] - Brenda Sanchez  [phone] - [place]       What options do I have for a visit other than an office visit? We offer electronic visits (e-visits) and telephone visits, when medically appropriate.  Please check with your medical insurance to see if these types of visits are covered, as you will be responsible for any charges that are not paid by your insurance.      You can use [employer] (secure electronic communication) to access to your chart, send your primary care provider a message, or make an appointment. Ask a team member how to get started.     For a price quote for your services, please call our Consumer Price Line at [phone] or our Imaging Cost estimation line at [phone] (for imaging tests).      Preventive Health Recommendations:   Male Ages 65 and over    Yearly exam:             See your health care provider every year in order to  o   Review health changes.   o   Discuss preventive care.    o   Review your medicines if your doctor has prescribed any.    Talk with your health care provider about whether you should have a test to screen for prostate cancer (PSA).    Every 3 years, have a diabetes test (fasting glucose). If you are at risk for diabetes, you should have this test more often.    Every 5 years, have a cholesterol test. Have this test more often if you are at risk for high cholesterol or heart disease.     Every 10 years, have a colonoscopy. Or, have a yearly FIT test (stool test). These exams will check for colon cancer.    Talk to with your health care provider about screening for Abdominal Aortic Aneurysm if you have a family history of AAA or have a history of smoking.  Shots:     Get a flu shot each year.     Get a tetanus shot  every 10 years.     Talk to your doctor about your pneumonia vaccines. There are now two you should receive - Pneumovax (PPSV 23) and Prevnar (PCV 13).    Talk to your doctor about a shingles vaccine.     Talk to your doctor about the hepatitis B vaccine.  Nutrition:     Eat at least 5 servings of fruits and vegetables each day.     Eat whole-grain bread, whole-wheat pasta and brown rice instead of white grains and rice.     Talk to your doctor about Calcium and Vitamin D.   Lifestyle    Exercise for at least 150 minutes a week (30 minutes a day, 5 days a week). This will help you control your weight and prevent disease.     Limit alcohol to one drink per day.     No smoking.     Wear sunscreen to prevent skin cancer.     See your dentist every six months for an exam and cleaning.     See your eye doctor every 1 to 2 years to screen for conditions such as glaucoma, macular degeneration and cataracts.          Follow-ups after your visit        Additional Services     OTOLARYNGOLOGY REFERRAL       Your provider has referred you to: [facility]: [facility] [place] [phone]   [email or web address]    Please be aware that coverage of these services is subject to the terms and limitations of your health insurance plan.  Call member services at your health plan with any benefit or coverage questions.      Please bring the following with you to your appointment:    (1) Any X-Rays, CTs or MRIs which have been performed.  Contact the facility where they were done to arrange for  prior to your scheduled appointment.   (2) List of current medications  (3) This referral request   (4) Any documents/labs given to you for this referral                  Follow-up notes from your care team     Return in about 1 month (around 7/5/2018) for free pharmacy blood pressure check (walk-in).      Who to contact     If you have questions or need follow up information about today's clinic visit or your  "schedule please contact [facility] directly at [phone].  Normal or non-critical lab and imaging results will be communicated to you by MyChart, letter or phone within 4 business days after the clinic has received the results. If you do not hear from us within 7 days, please contact the clinic through MyChart or phone. If you have a critical or abnormal lab result, we will notify you by phone as soon as possible.  Submit refill requests through [employer] or call your pharmacy and they will forward the refill request to us. Please allow 3 business days for your refill to be completed.          Additional Information About Your Visit        Care EveryWhere ID     This is your Care EveryWhere ID. This could be used by other organizations to access your [place] medical records  [id number]        Your Vitals Were     Pulse Temperature Respirations Height Pulse Oximetry BMI (Body Mass Index)    82 97.6  F (36.4  C) (Oral) 24 5' 4\" (1.626 m) 93% 34.84 kg/m2       Blood Pressure from Last 3 Encounters:   06/05/18 (!) 146/96   05/15/18 128/86   03/02/17 128/84    Weight from Last 3 Encounters:   06/05/18 203 lb (92.1 kg)   05/15/18 203 lb 6.4 oz (92.3 kg)   03/02/17 203 lb (92.1 kg)              We Performed the Following     OTOLARYNGOLOGY REFERRAL          Today's Medication Changes          These changes are accurate as of 6/5/18  9:42 AM.  If you have any questions, ask your nurse or doctor.               Start taking these medicines.        Dose/Directions    atorvastatin 10 MG tablet   Commonly known as:  LIPITOR   Used for:  Hyperlipidemia LDL goal <130   Started by:  Tanya Renee MD        Dose:  10 mg   Take 1 tablet (10 mg) by mouth daily   Quantity:  90 tablet   Refills:  3       trimethoprim-polymyxin b ophthalmic solution   Commonly known as:  POLYTRIM   Used for:  Blepharitis of eyelid of right eye, unspecified eyelid, unspecified type   Started by:  Tanya Renee MD        Dose:  1 " drop   Place 1 drop into the right eye 3 times daily for 7 days   Quantity:  2 mL   Refills:  0         These medicines have changed or have updated prescriptions.        Dose/Directions    * atenolol 25 MG tablet   Commonly known as:  TENORMIN   This may have changed:  Another medication with the same name was added. Make sure you understand how and when to take each.   Used for:  Hypertension goal BP (blood pressure) < 150/90   Changed by:  Tanya Renee MD        Dose:  25 mg   Take 1 tablet (25 mg) by mouth daily   Quantity:  90 tablet   Refills:  3       * atenolol 50 MG tablet   Commonly known as:  TENORMIN   This may have changed:  You were already taking a medication with the same name, and this prescription was added. Make sure you understand how and when to take each.   Used for:  Hypertension goal BP (blood pressure) < 150/90   Changed by:  Tanya Renee MD        Dose:  50 mg   Take 1 tablet (50 mg) by mouth daily   Quantity:  30 tablet   Refills:  0       * Notice:  This list has 2 medication(s) that are the same as other medications prescribed for you. Read the directions carefully, and ask your doctor or other care provider to review them with you.         Where to get your medicines      These medications were sent to [facility]/pharmacy #[id number] - [place], [place] - [address] [place] [address] [address] OF [address], [facility] [address]     Phone:  [phone]     atenolol 50 MG tablet    atorvastatin 10 MG tablet    fluticasone-salmeterol 500-50 MCG/DOSE diskus inhaler    trimethoprim-polymyxin b ophthalmic solution                Primary Care Provider Office Phone # Fax #    Tanya Renee [phone]       [address] [facility] [address]        Goals        General    Medical (pt-stated)     Notes - Note created  12/18/2015 12:27 PM by Pooja Campbell RN    Patient/mom will state understanding of S/S of worsening respiratory status and appropriate  interventions, such as, calling clinic or seeking emergency care.       Medical (pt-stated)     Notes - Note created  12/18/2015 12:28 PM by Pooja Campbell RN    Patient/mom will state understanding regarding interventions to use to maintain stable respiratory status, such as, appropriate nutrition and hydration, appropriate medication/inhaler/nebulizer use.          Equal Access to Services     PARISA ZAVALA : Hadii yvette broderick hadelidao Sooscarali, waaxda luqadaha, qaybta kaalmada keeshamatycatherine, alpesh thao curtislucy torres . So [facility] [phone].    ATENCIÓN: Si habla español, tiene a wang disposición servicios gratuitos de asistencia lingüística. Tita al [phone].    We comply with applicable federal civil rights laws and [place] laws. We do not discriminate on the basis of race, color, national origin, age, disability, sex, sexual orientation, or gender identity.            Thank you!     Thank you for choosing [facility]  for your care. Our goal is always to provide you with excellent care. Hearing back from our patients is one way we can continue to improve our services. Please take a few minutes to complete the written survey that you may receive in the mail after your visit with us. Thank you!             Your Updated Medication List - Protect others around you: Learn how to safely use, store and throw away your medicines at www.disposemymeds.org.          This list is accurate as of 6/5/18  9:42 AM.  Always use your most recent med list.                   Brand Name Dispense Instructions for use Diagnosis    * albuterol (2.5 MG/3ML) 0.083% neb solution     225 mL    TAKE 3 MLS BY NEBULIZATION EVERY 4 HOURS AS NEEDED    Pulmonary emphysema, unspecified emphysema type (H)       * albuterol 108 (90 Base) MCG/ACT Inhaler    PROAIR HFA    1 Inhaler    Inhale 1-2 puffs into the lungs every 4 hours as needed    Pulmonary emphysema, unspecified emphysema type (H)       aspirin 81 MG tablet       Take 1 tablet by mouth daily.        * atenolol 25 MG tablet    TENORMIN    90 tablet    Take 1 tablet (25 mg) by mouth daily    Hypertension goal BP (blood pressure) < 150/90       * atenolol 50 MG tablet    TENORMIN    30 tablet    Take 1 tablet (50 mg) by mouth daily    Hypertension goal BP (blood pressure) < 150/90       atorvastatin 10 MG tablet    LIPITOR    90 tablet    Take 1 tablet (10 mg) by mouth daily    Hyperlipidemia LDL goal <130       cholecalciferol 1000 UNIT tablet    vitamin D3     Take 1 tablet (1,000 Units) by mouth daily        divalproex sodium extended-release 500 MG 24 hr tablet    DEPAKOTE ER    180 tablet    TAKE 2 TABLETS (1,000 MG) BY MOUTH DAILY    Traumatic brain injury, without loss of consciousness, subsequent encounter       fluticasone-salmeterol 500-50 MCG/DOSE diskus inhaler    ADVAIR DISKUS    3 Inhaler    INHALE 1 PUFF INTO THE LUNGS TWICE DAILY    Pulmonary emphysema, unspecified emphysema type (H)       levothyroxine 25 MCG tablet    SYNTHROID/LEVOTHROID    90 tablet    TAKE 1 TABLET (25 MCG) BY MOUTH DAILY    Acquired hypothyroidism       potassium chloride 10 MEQ tablet    KLOR-CON    90 tablet    Take 1 tablet (10 mEq) by mouth daily    Hypertension goal BP (blood pressure) < 150/90       tiotropium 18 MCG capsule    SPIRIVA HANDIHALER    90 capsule    Inhale 1 capsule (18 mcg) into the lungs daily    Pulmonary emphysema, unspecified emphysema type (H)       triamterene-hydrochlorothiazide 75-50 MG per tablet    MAXZIDE    45 tablet    TAKE ONE-HALF TABLET BY MOUTH ONCE DAILY    Hypertension goal BP (blood pressure) < 150/90       trimethoprim-polymyxin b ophthalmic solution    POLYTRIM    2 mL    Place 1 drop into the right eye 3 times daily for 7 days    Blepharitis of eyelid of right eye, unspecified eyelid, unspecified type       * Notice:  This list has 4 medication(s) that are the same as other medications prescribed for you. Read the directions carefully, and ask  your doctor or other care provider to review them with you.

## 2018-06-05 NOTE — PATIENT INSTRUCTIONS
You can stop taking montelukast.    It is recommended that you get a vaccination for shingles called Shingrix (given as 2 shots, 2 to 6 months apart), even if you have already had the Zostavax vaccine. Discuss getting the Shingix vaccine from your pharmacist, or schedule an ancillary shot visit here. Some insurances do not cover the cost of these vaccines.      Did you know you can lower your blood pressure with your daily habits?    *Losing 20 pounds of weight lowers blood pressure 5 to 20 points.  *Eating a low-fat diet rich in fruits, vegetables and low-fat dairy lowers blood pressure 8 to 14 points.  *Eating a low-salt diet lowers blood pressure 2 to 8 points.  *Exercising regularly lowers blood pressure 4 to 9 points.  *Reducing alcohol use lowers blood pressure 2 to 4 points.    [facility]    If you have any questions regarding to your visit please contact your care team:       Team Red:   Clinic Hours Telephone Number   Dr. Tanya Saha, NP   7am-7pm  Monday - Thursday   7am-5pm  Fridays  [phone]  (Appointment scheduling available 24/7)    Questions about your recent visit?   Team Line  [phone]   Urgent Care - [place] and [facility] - 11am-9pm Monday-Friday Saturday-Sunday- 9am-5pm   [place] - 5pm-9pm Monday-Friday Saturday-Sunday- 9am-5pm  [phone] - Brenda Sanchez  [phone] - [place]       What options do I have for a visit other than an office visit? We offer electronic visits (e-visits) and telephone visits, when medically appropriate.  Please check with your medical insurance to see if these types of visits are covered, as you will be responsible for any charges that are not paid by your insurance.      You can use [employer] (secure electronic communication) to access to your chart, send your primary care provider a message, or make an appointment. Ask a team member how to get started.     For a price  quote for your services, please call our Consumer Price Line at [phone] or our Imaging Cost estimation line at [phone] (for imaging tests).      Preventive Health Recommendations:   Male Ages 65 and over    Yearly exam:             See your health care provider every year in order to  o   Review health changes.   o   Discuss preventive care.    o   Review your medicines if your doctor has prescribed any.    Talk with your health care provider about whether you should have a test to screen for prostate cancer (PSA).    Every 3 years, have a diabetes test (fasting glucose). If you are at risk for diabetes, you should have this test more often.    Every 5 years, have a cholesterol test. Have this test more often if you are at risk for high cholesterol or heart disease.     Every 10 years, have a colonoscopy. Or, have a yearly FIT test (stool test). These exams will check for colon cancer.    Talk to with your health care provider about screening for Abdominal Aortic Aneurysm if you have a family history of AAA or have a history of smoking.  Shots:     Get a flu shot each year.     Get a tetanus shot every 10 years.     Talk to your doctor about your pneumonia vaccines. There are now two you should receive - Pneumovax (PPSV 23) and Prevnar (PCV 13).    Talk to your doctor about a shingles vaccine.     Talk to your doctor about the hepatitis B vaccine.  Nutrition:     Eat at least 5 servings of fruits and vegetables each day.     Eat whole-grain bread, whole-wheat pasta and brown rice instead of white grains and rice.     Talk to your doctor about Calcium and Vitamin D.   Lifestyle    Exercise for at least 150 minutes a week (30 minutes a day, 5 days a week). This will help you control your weight and prevent disease.     Limit alcohol to one drink per day.     No smoking.     Wear sunscreen to prevent skin cancer.     See your dentist every six months for an exam and cleaning.     See your eye doctor every 1  to 2 years to screen for conditions such as glaucoma, macular degeneration and cataracts.

## 2018-06-05 NOTE — PROGRESS NOTES
SUBJECTIVE:   Gaudencio Avila is a 68 year old obese male with history of TBI who presents with his mother for Preventive Visit.  Are you in the first 12 months of your Medicare coverage?  No    COPD well controlled with medications without dyspnea, wheezing or cough. Hypertension well controlled on current medications without side effects, chest pain, or dyspnea. Patient also presents for follow-up of hypothyroidism, controlled on current medication.  Denies symptoms of hypo- or hyperthyroidism.     Physical   Annual:     Getting at least 3 servings of Calcium per day::  Yes    Bi-annual eye exam::  Yes    Dental care twice a year::  NO    Sleep apnea or symptoms of sleep apnea::  None    Diet::  Regular (no restrictions)    Frequency of exercise::  None    Taking medications regularly::  Yes    Medication side effects::  None    Additional concerns today::  No    Ability to successfully perform activities of daily living: no assistance needed  Home Safety:  No safety concerns identified  Hearing Impairment: no hearing concerns        Fall risk:  Fallen 2 or more times in the past year?: No  Any fall with injury in the past year?: No    COGNITIVE SCREEN  1) Repeat 3 items (Banana, Sunrise, Chair)    2) Clock draw: ABNORMAL hand placement  3) 3 item recall: Recalls 3 objects  Results: 3 items recalled: COGNITIVE IMPAIRMENT LESS LIKELY    Mini-CogTM Copyright S Gela. Licensed by the author for use in [facility]; reprinted with permission (jaswant@.[facility]). All rights reserved.        Reviewed and updated as needed this visit by clinical staff  Tobacco  Allergies  Meds  Problems  Med Hx  Surg Hx  Fam Hx  Soc Hx          Reviewed and updated as needed this visit by Provider  Tobacco  Allergies  Meds  Problems  Med Hx  Surg Hx  Fam Hx  Soc Hx         Social History   Substance Use Topics     Smoking status: Former Smoker     Packs/day: 0.50     Years: 4.00     Types: Cigarettes     Quit date:  1/1/1970     Smokeless tobacco: Never Used     Alcohol use No       Alcohol Use 6/5/2018   If you drink alcohol do you typically have greater than 3 drinks per day OR greater than 7 drinks per week? Not Applicable       Today's PHQ-2 Score:   PHQ-2 ( 1999 [employer]) 6/5/2018   Q1: Little interest or pleasure in doing things 0   Q2: Feeling down, depressed or hopeless 0   PHQ-2 Score 0   Q1: Little interest or pleasure in doing things Not at all   Q2: Feeling down, depressed or hopeless Not at all   PHQ-2 Score 0       Do you feel safe in your environment - Yes    Do you have a Health Care Directive?: No: Advance care planning was reviewed with patient; patient declined at this time.    Current providers sharing in care for this patient include:   Patient Care Team:  Tanya Renee MD as PCP - General (Family Practice)  Mariah Welch APRN CNP as Nurse Practitioner (Nurse Practitioner)    The following health maintenance items are reviewed in Epic and correct as of today:  Health Maintenance   Topic Date Due     COLONOSCOPY Q3 YR  03/21/2016     TSH W/ FREE T4 REFLEX Q1 YEAR  05/15/2019     BMP Q1 YR  05/15/2019     COPD ACTION PLAN Q1 YR  05/15/2019     FALL RISK ASSESSMENT  05/15/2019     LIPID MONITORING Q1 YEAR  05/15/2019     TETANUS IMMUNIZATION (SYSTEM ASSIGNED)  11/15/2020     ADVANCE DIRECTIVE PLANNING Q5 YRS  03/02/2022     SPIROMETRY ONETIME  Completed     PNEUMOCOCCAL  Completed     INFLUENZA VACCINE  Completed     AORTIC ANEURYSM SCREENING (SYSTEM ASSIGNED)  Completed     HEPATITIS C SCREENING  Completed     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)     Hypertension goal BP (blood pressure) < 150/90     Hyperlipidemia LDL goal <130     Traumatic brain injury (H)     Dysphagia causing pulmonary aspiration with swallowing     GERD (gastroesophageal reflux disease)     Advanced directives,  counseling/discussion     Health Care Home     Past Surgical History:   Procedure Laterality Date     BIOPSY LIVER  2007     FRACTURE TX, ANKLE RT/LT       LAPAROTOMY EXPLORATORY  1973    splenic rupture, liver laceration       Social History   Substance Use Topics     Smoking status: Former Smoker     Packs/day: 0.50     Years: 4.00     Types: Cigarettes     Quit date: 1/1/1970     Smokeless tobacco: Never Used     Alcohol use No     Family History   Problem Relation Age of Onset     Breast Cancer Mother      C.A.D. Mother      Hypertension Mother      Respiratory Brother      MEDARDO     DIABETES Brother      Cancer - colorectal No family hx of          Current Outpatient Prescriptions   Medication Sig Dispense Refill     albuterol (2.5 MG/3ML) 0.083% nebulizer solution TAKE 3 MLS BY NEBULIZATION EVERY 4 HOURS AS NEEDED 225 mL 11     albuterol (ALBUTEROL) 108 (90 BASE) MCG/ACT inhaler Inhale 1-2 puffs into the lungs every 4 hours as needed 1 Inhaler 11     aspirin 81 MG tablet Take 1 tablet by mouth daily.  3     atenolol (TENORMIN) 25 MG tablet Take 1 tablet (25 mg) by mouth daily 90 tablet 3     atenolol (TENORMIN) 50 MG tablet Take 1 tablet (50 mg) by mouth daily 30 tablet 0     atorvastatin (LIPITOR) 10 MG tablet Take 1 tablet (10 mg) by mouth daily 90 tablet 3     cholecalciferol (VITAMIN D) 1000 UNIT tablet Take 1 tablet (1,000 Units) by mouth daily       divalproex sodium extended-release (DEPAKOTE ER) 500 MG 24 hr tablet TAKE 2 TABLETS (1,000 MG) BY MOUTH DAILY 180 tablet 3     fluticasone-salmeterol (ADVAIR DISKUS) 500-50 MCG/DOSE diskus inhaler INHALE 1 PUFF INTO THE LUNGS TWICE DAILY 3 Inhaler 3     levothyroxine (SYNTHROID/LEVOTHROID) 25 MCG tablet TAKE 1 TABLET (25 MCG) BY MOUTH DAILY 90 tablet 3     potassium chloride (KLOR-CON) 10 MEQ tablet Take 1 tablet (10 mEq) by mouth daily 90 tablet 3     tiotropium (SPIRIVA HANDIHALER) 18 MCG capsule Inhale 1 capsule (18 mcg) into the lungs daily 90 capsule 3      "triamterene-hydrochlorothiazide (MAXZIDE) 75-50 MG per tablet TAKE ONE-HALF TABLET BY MOUTH ONCE DAILY 45 tablet 3     trimethoprim-polymyxin b (POLYTRIM) ophthalmic solution Place 1 drop into the right eye 3 times daily for 7 days 2 mL 0     Allergies   Allergen Reactions     Interferons      Review of Systems  CONSTITUTIONAL: NEGATIVE for fever, chills, change in weight  INTEGUMENTARY/SKIN: NEGATIVE for worrisome rashes, moles or lesions  EYES: NEGATIVE for vision changes or irritation  ENT/MOUTH: NEGATIVE for ear, mouth and throat problems  RESP: NEGATIVE for significant cough or SOB  CV: NEGATIVE for chest pain, palpitations or peripheral edema  GI: NEGATIVE for nausea, abdominal pain, heartburn, or change in bowel habits  : NEGATIVE for frequency, dysuria, or hematuria  MUSCULOSKELETAL: NEGATIVE for significant arthralgias or myalgia  NEURO: NEGATIVE for weakness, dizziness or paresthesias  ENDOCRINE: NEGATIVE for temperature intolerance, skin/hair changes  HEME: NEGATIVE for bleeding problems  PSYCHIATRIC: NEGATIVE for changes in mood or affect    OBJECTIVE:   BP (!) 146/96  Pulse 82  Temp 97.6  F (36.4  C) (Oral)  Resp 24  Ht 5' 4\" (1.626 m)  Wt 203 lb (92.1 kg)  SpO2 93%  BMI 34.84 kg/m2 Estimated body mass index is 34.84 kg/(m^2) as calculated from the following:    Height as of this encounter: 5' 4\" (1.626 m).    Weight as of this encounter: 203 lb (92.1 kg).  Physical Exam  GENERAL: alert, no distress and obese  EYES: right lashes with exudate; PERRL; normal conjunctivae   HENT: normal cephalic/atraumatic, right ear: normal: no effusions, no erythema, normal landmarks, left ear: fleshy papule with telangiectasia in canal, nose and mouth without ulcers or lesions and oral mucous membranes moist  NECK: no adenopathy, no asymmetry, masses, or scars and thyroid normal to palpation  RESP: decreased breath sounds throughout  CV: regular rate and rhythm, normal S1 S2, no S3 or S4, no murmur, click or " rub, no peripheral edema and peripheral pulses strong  ABDOMEN: soft, nontender, no hepatosplenomegaly, no masses and bowel sounds normal  MS: slow gait; no deformity   SKIN: no suspicious lesions or rashes  NEURO: sparse speech   PSYCH: affect normal/bright    ASSESSMENT / PLAN:   (Z00.00) Routine history and physical examination of adult  (primary encounter diagnosis)    (S06.9X0D) Traumatic brain injury, without loss of consciousness, subsequent encounter  Plan: continue plan     (J43.9) Pulmonary emphysema, unspecified emphysema type (H)  Comment: Well controlled with medications without side effects.   Plan: fluticasone-salmeterol (ADVAIR DISKUS) 500-50         MCG/DOSE diskus inhaler        Okay to try off Singulair and indication is not clear     (H93.8X2) Ear canal mass, left  Comment: Differential diagnoses would include: BCC   Plan: OTOLARYNGOLOGY REFERRAL          (H01.003) Blepharitis of eyelid of right eye, unspecified eyelid, unspecified type  Plan: trimethoprim-polymyxin b (POLYTRIM) ophthalmic         solution          (I10) Hypertension goal BP (blood pressure) < 150/90  Comment: uncontrolled   Plan: atenolol (TENORMIN) 50 MG tablet        Increase dose. Follow up ancillary blood pressure recheck in one month or sooner for worsening of symptoms or side effects.      (E78.5) Hyperlipidemia LDL goal <130  Plan: atorvastatin (LIPITOR) 10 MG tablet        Discussed risks and benefits of this medication.     (E03.9) Acquired hypothyroidism  Comment: euthyroid on replacement     (Z12.11) Special screening for malignant neoplasms, colon  Plan: referral given     End of Life Planning:  Patient currently has an advanced directive: No.  I have verified the patient's ablity to prepare an advanced directive/make health care decisions.     COUNSELING:  Reviewed preventive health counseling, as reflected in patient instructions  Special attention given to:       Regular exercise       Healthy diet/nutrition        "Hepatitis C screening       Colon cancer screening       The 10-year ASCVD risk score (Santiago MONTILLA Jr, et al., 2013) is: 29.2%    Values used to calculate the score:      Age: 68 years      Sex: Male      Is Non- : No      Diabetic: No      Tobacco smoker: No      Systolic Blood Pressure: 146 mmHg      Is BP treated: Yes      HDL Cholesterol: 31 mg/dL      Total Cholesterol: 196 mg/dL        Estimated body mass index is 34.84 kg/(m^2) as calculated from the following:    Height as of this encounter: 5' 4\" (1.626 m).    Weight as of this encounter: 203 lb (92.1 kg).  Weight management plan: Discussed healthy diet and exercise guidelines and patient will follow up in 12 months in clinic to re-evaluate.   reports that he quit smoking about 48 years ago. His smoking use included Cigarettes. He has a 2.00 pack-year smoking history. He has never used smokeless tobacco.      Appropriate preventive services were discussed with this patient, including applicable screening as appropriate for cardiovascular disease, diabetes, osteopenia/osteoporosis, and glaucoma.  As appropriate for age/gender, discussed screening for colorectal cancer, prostate cancer, breast cancer, and cervical cancer. Checklist reviewing preventive services available has been given to the patient.    Reviewed patients plan of care and provided an AVS. The Basic Care Plan (routine screening as documented in Health Maintenance) for Gaudencio meets the Care Plan requirement. This Care Plan has been established and reviewed with the Patient and mother.    Counseling Resources:  ATP IV Guidelines  Pooled Cohorts Equation Calculator  Breast Cancer Risk Calculator  FRAX Risk Assessment  ICSI Preventive Guidelines  Dietary Guidelines for Americans, 2010  USDA's MyPlate  ASA Prophylaxis  Lung CA Screening    Tanya Renee MD  [facility]  "

## 2018-06-07 ENCOUNTER — OFFICE VISIT (OUTPATIENT)
Dept: OTOLARYNGOLOGY | Facility: CLINIC | Age: 68
End: 2018-06-07
Payer: COMMERCIAL

## 2018-06-07 VITALS
OXYGEN SATURATION: 93 % | BODY MASS INDEX: 34.66 KG/M2 | SYSTOLIC BLOOD PRESSURE: 155 MMHG | WEIGHT: 203 LBS | DIASTOLIC BLOOD PRESSURE: 85 MMHG | RESPIRATION RATE: 24 BRPM | HEIGHT: 64 IN | HEART RATE: 69 BPM

## 2018-06-07 DIAGNOSIS — D16.4 OSTEOMA OF EXTERNAL EAR CANAL: Primary | ICD-10-CM

## 2018-06-07 DIAGNOSIS — H61.21 IMPACTED CERUMEN OF RIGHT EAR: ICD-10-CM

## 2018-06-07 PROCEDURE — 69210 REMOVE IMPACTED EAR WAX UNI: CPT | Performed by: OTOLARYNGOLOGY

## 2018-06-07 PROCEDURE — 99203 OFFICE O/P NEW LOW 30 MIN: CPT | Mod: 25 | Performed by: OTOLARYNGOLOGY

## 2018-06-07 NOTE — PATIENT INSTRUCTIONS
Scheduling Information  To schedule your CT/MRI scan, please contact [facility] at [phone] OR [facility] at [phone]    To schedule your Surgery, please contact our Specialty Schedulers at [phone]    ** If a CT scan or biopsy were ordered/done, Dr. Manzano will need to see you back in clinic to go over your biopsy results or CT/MRI scan. He will go over the images from your scan with you and discuss treatment based on your results.     ENT Clinic Locations Clinic Hours Telephone Number     [facility]  [address]  [place] [address]   E/O Thursday:      7:30am -- 4:00pm   To schedule/reschedule an appointment with   Dr. Manzano,   please contact our   Specialty Scheduling Department at:     [phone]       [facility]  [address] [place] [address]     Monday:              12:00pm -- 4:00pm    Tuesday:               8:30am -- 4:30pm    Wednesday:        12:00pm -- 4:00pm    E/O Thursday:        8:00am - 4:30pm           Urgent Care Locations Clinic Hours Telephone Numbers     [facility]  [address]  [place] [address]     Monday-Friday:     11:00am - 9:00pm    Saturday-Sunday:  9:00am - 5:00pm   [phone]     [facility]  [address] [place] [address]     Monday-Friday:      5:00pm - 9:00pm     Saturday-Sunday:  9:00am - 5:00pm   [phone]

## 2018-06-07 NOTE — PROGRESS NOTES
History of Present Illness - Gaudencio Avila is a 68 year old male who presents with concerns about a lump in the left ear canal. He is unaware of any associated symptoms. He denies any trouble with his hearing. He has no ear pain. He lives with his mother.    Past Medical History -   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)     Hypertension goal BP (blood pressure) < 150/90     Hyperlipidemia LDL goal <130     Traumatic brain injury (H)     Dysphagia causing pulmonary aspiration with swallowing     GERD (gastroesophageal reflux disease)     Advanced directives, counseling/discussion     Health Care Home       Current Medications -   Current Outpatient Prescriptions:      albuterol (2.5 MG/3ML) 0.083% nebulizer solution, TAKE 3 MLS BY NEBULIZATION EVERY 4 HOURS AS NEEDED, Disp: 225 mL, Rfl: 11     albuterol (ALBUTEROL) 108 (90 BASE) MCG/ACT inhaler, Inhale 1-2 puffs into the lungs every 4 hours as needed, Disp: 1 Inhaler, Rfl: 11     aspirin 81 MG tablet, Take 1 tablet by mouth daily., Disp: , Rfl: 3     atenolol (TENORMIN) 25 MG tablet, Take 1 tablet (25 mg) by mouth daily, Disp: 90 tablet, Rfl: 3     atenolol (TENORMIN) 50 MG tablet, Take 1 tablet (50 mg) by mouth daily, Disp: 30 tablet, Rfl: 0     atorvastatin (LIPITOR) 10 MG tablet, Take 1 tablet (10 mg) by mouth daily, Disp: 90 tablet, Rfl: 3     cholecalciferol (VITAMIN D) 1000 UNIT tablet, Take 1 tablet (1,000 Units) by mouth daily, Disp: , Rfl:      divalproex sodium extended-release (DEPAKOTE ER) 500 MG 24 hr tablet, TAKE 2 TABLETS (1,000 MG) BY MOUTH DAILY, Disp: 180 tablet, Rfl: 3     fluticasone-salmeterol (ADVAIR DISKUS) 500-50 MCG/DOSE diskus inhaler, INHALE 1 PUFF INTO THE LUNGS TWICE DAILY, Disp: 3 Inhaler, Rfl: 3     levothyroxine (SYNTHROID/LEVOTHROID) 25 MCG tablet, TAKE 1 TABLET (25 MCG) BY MOUTH DAILY, Disp: 90 tablet, Rfl: 3      "potassium chloride (KLOR-CON) 10 MEQ tablet, Take 1 tablet (10 mEq) by mouth daily, Disp: 90 tablet, Rfl: 3     tiotropium (SPIRIVA HANDIHALER) 18 MCG capsule, Inhale 1 capsule (18 mcg) into the lungs daily, Disp: 90 capsule, Rfl: 3     triamterene-hydrochlorothiazide (MAXZIDE) 75-50 MG per tablet, TAKE ONE-HALF TABLET BY MOUTH ONCE DAILY, Disp: 45 tablet, Rfl: 3     trimethoprim-polymyxin b (POLYTRIM) ophthalmic solution, Place 1 drop into the right eye 3 times daily for 7 days, Disp: 2 mL, Rfl: 0    Allergies -   Allergies   Allergen Reactions     Interferons        Social History -   Social History     Social History     Marital status: Single     Spouse name: N/A     Number of children: 0     Years of education: 12     Occupational History      Disabled      Retired     Social History Main Topics     Smoking status: Former Smoker     Packs/day: 0.50     Years: 4.00     Types: Cigarettes     Quit date: 1/1/1970     Smokeless tobacco: Never Used     Alcohol use No     Drug use: No     Sexual activity: No     Other Topics Concern     Parent/Sibling W/ Cabg, Mi Or Angioplasty Before 65f 55m? No     Social History Narrative       Family History -   Family History   Problem Relation Age of Onset     Breast Cancer Mother      C.A.D. Mother      Hypertension Mother      Respiratory Brother      MEDARDO     DIABETES Brother      Cancer - colorectal No family hx of        Review of Systems - As per HPI and PMHx, otherwise 7 system review of the head and neck negative. 10+ system review negative.    Physical Exam  /85  Pulse 69  Resp 24  Ht 1.626 m (5' 4\")  Wt 92.1 kg (203 lb)  SpO2 93%  BMI 34.84 kg/m2  General - The patient is well nourished and well developed, and appears to have good nutritional status.  Alert and oriented to person and place, answers questions and cooperates with examination appropriately.   Head and Face - Normocephalic and atraumatic, with no gross asymmetry noted of the contour of the " facial features.  The facial nerve is intact, with strong symmetric movements.  Voice and Breathing - The patient was breathing comfortably without the use of accessory muscles. There was no wheezing, stridor, or stertor.  The patients voice was clear and strong, and had appropriate pitch and quality.  Ears - after cerumen was removed on the right, there was a small bony osteoma on the posterior aspect of the bony canal. On the left, there are 3 bony osteomas in the bony canal, but the TM can be visualized.  Eyes - Extraocular movements intact.  Sclera were not icteric or injected, conjunctiva were pink and moist.  Mouth - Examination of the oral cavity showed pink, healthy oral mucosa. No lesions or ulcerations noted.  The tongue was mobile and midline, and the dentition were in good condition.    Throat - The walls of the oropharynx were smooth, pink, moist, symmetric, and had no lesions or ulcerations.  The tonsillar pillars and soft palate were symmetric.  The uvula was midline on elevation.  Neck - Normal midline excursion of the laryngotracheal complex during swallowing.  Full range of motion on passive movement.  Palpation of the occipital, submental, submandibular, internal jugular chain, and supraclavicular nodes did not demonstrate any abnormal lymph nodes or masses.  The carotid pulse was palpable bilaterally.  Palpation of the thyroid was soft and smooth, with no nodules or goiter appreciated.  The trachea was mobile and midline.  Nose - External contour is symmetric, no gross deflection or scars.  Nasal mucosa is pink and moist with no abnormal mucus.  The septum was midline and non-obstructive, turbinates of normal size and position.  No polyps, masses, or purulence noted on examination.    Procedure: Cerumen Disimpaction  Diagnosis: Cerumen Impaction    Procedure and Findings  Ears - On examination of the ears, I found that the  ears were impacted with dense cerumen obscuring visualization of the  right TM.  Therefore, I positioned the patient and I used the binocular surgical microscope to assist in visualization of the affected ear(s).  I began by using a cerumen loop to gently lift the edges of the cerumen mass away from the walls of the external canal.  Once I did this, I was able to suction away fragments of wax and debris using suction.  Once the mass was loose enough, the entire plug was pulled from the canal with microforceps.  The tympanic membrane was intact without sign of perforation or middle ear effusion and minimal ear canal trauma.       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 He had cerumen impaction on the right, which was cleared. I reassured him that this is a reasonably common and benign condition often thought to be due to cold water exposure. Return as needed for cerumen removal.     Patient to follow up with Primary Care provider regarding elevated blood pressure.    Dr. Yuliana Manzano MD  Otolaryngology  [facility]

## 2018-06-07 NOTE — LETTER
6/7/2018         RE: Gaudencio Avila  [address]  [facility] [address]        Dear Colleague,    Thank you for referring your patient, Gaudencio Avila, to the [facility]. Please see a copy of my visit note below.        History of Present Illness - Gaudencio Avila is a 68 year old male who presents with concerns about a lump in the left ear canal. He is unaware of any associated symptoms. He denies any trouble with his hearing. He has no ear pain. He lives with his mother.    Past Medical History -   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)     Hypertension goal BP (blood pressure) < 150/90     Hyperlipidemia LDL goal <130     Traumatic brain injury (H)     Dysphagia causing pulmonary aspiration with swallowing     GERD (gastroesophageal reflux disease)     Advanced directives, counseling/discussion     Health Care Home       Current Medications -   Current Outpatient Prescriptions:      albuterol (2.5 MG/3ML) 0.083% nebulizer solution, TAKE 3 MLS BY NEBULIZATION EVERY 4 HOURS AS NEEDED, Disp: 225 mL, Rfl: 11     albuterol (ALBUTEROL) 108 (90 BASE) MCG/ACT inhaler, Inhale 1-2 puffs into the lungs every 4 hours as needed, Disp: 1 Inhaler, Rfl: 11     aspirin 81 MG tablet, Take 1 tablet by mouth daily., Disp: , Rfl: 3     atenolol (TENORMIN) 25 MG tablet, Take 1 tablet (25 mg) by mouth daily, Disp: 90 tablet, Rfl: 3     atenolol (TENORMIN) 50 MG tablet, Take 1 tablet (50 mg) by mouth daily, Disp: 30 tablet, Rfl: 0     atorvastatin (LIPITOR) 10 MG tablet, Take 1 tablet (10 mg) by mouth daily, Disp: 90 tablet, Rfl: 3     cholecalciferol (VITAMIN D) 1000 UNIT tablet, Take 1 tablet (1,000 Units) by mouth daily, Disp: , Rfl:      divalproex sodium extended-release (DEPAKOTE ER) 500 MG 24 hr tablet, TAKE 2 TABLETS (1,000 MG) BY MOUTH DAILY, Disp: 180 tablet, Rfl: 3      "fluticasone-salmeterol (ADVAIR DISKUS) 500-50 MCG/DOSE diskus inhaler, INHALE 1 PUFF INTO THE LUNGS TWICE DAILY, Disp: 3 Inhaler, Rfl: 3     levothyroxine (SYNTHROID/LEVOTHROID) 25 MCG tablet, TAKE 1 TABLET (25 MCG) BY MOUTH DAILY, Disp: 90 tablet, Rfl: 3     potassium chloride (KLOR-CON) 10 MEQ tablet, Take 1 tablet (10 mEq) by mouth daily, Disp: 90 tablet, Rfl: 3     tiotropium (SPIRIVA HANDIHALER) 18 MCG capsule, Inhale 1 capsule (18 mcg) into the lungs daily, Disp: 90 capsule, Rfl: 3     triamterene-hydrochlorothiazide (MAXZIDE) 75-50 MG per tablet, TAKE ONE-HALF TABLET BY MOUTH ONCE DAILY, Disp: 45 tablet, Rfl: 3     trimethoprim-polymyxin b (POLYTRIM) ophthalmic solution, Place 1 drop into the right eye 3 times daily for 7 days, Disp: 2 mL, Rfl: 0    Allergies -   Allergies   Allergen Reactions     Interferons        Social History -   Social History     Social History     Marital status: Single     Spouse name: N/A     Number of children: 0     Years of education: 12     Occupational History      Disabled      Retired     Social History Main Topics     Smoking status: Former Smoker     Packs/day: 0.50     Years: 4.00     Types: Cigarettes     Quit date: 1/1/1970     Smokeless tobacco: Never Used     Alcohol use No     Drug use: No     Sexual activity: No     Other Topics Concern     Parent/Sibling W/ Cabg, Mi Or Angioplasty Before 65f 55m? No     Social History Narrative       Family History -   Family History   Problem Relation Age of Onset     Breast Cancer Mother      C.A.D. Mother      Hypertension Mother      Respiratory Brother      MEDARDO     DIABETES Brother      Cancer - colorectal No family hx of        Review of Systems - As per HPI and PMHx, otherwise 7 system review of the head and neck negative. 10+ system review negative.    Physical Exam  /85  Pulse 69  Resp 24  Ht 1.626 m (5' 4\")  Wt 92.1 kg (203 lb)  SpO2 93%  BMI 34.84 kg/m2  General - The patient is well nourished and well " developed, and appears to have good nutritional status.  Alert and oriented to person and place, answers questions and cooperates with examination appropriately.   Head and Face - Normocephalic and atraumatic, with no gross asymmetry noted of the contour of the facial features.  The facial nerve is intact, with strong symmetric movements.  Voice and Breathing - The patient was breathing comfortably without the use of accessory muscles. There was no wheezing, stridor, or stertor.  The patients voice was clear and strong, and had appropriate pitch and quality.  Ears - after cerumen was removed on the right, there was a small bony osteoma on the posterior aspect of the bony canal. On the left, there are 3 bony osteomas in the bony canal, but the TM can be visualized.  Eyes - Extraocular movements intact.  Sclera were not icteric or injected, conjunctiva were pink and moist.  Mouth - Examination of the oral cavity showed pink, healthy oral mucosa. No lesions or ulcerations noted.  The tongue was mobile and midline, and the dentition were in good condition.    Throat - The walls of the oropharynx were smooth, pink, moist, symmetric, and had no lesions or ulcerations.  The tonsillar pillars and soft palate were symmetric.  The uvula was midline on elevation.  Neck - Normal midline excursion of the laryngotracheal complex during swallowing.  Full range of motion on passive movement.  Palpation of the occipital, submental, submandibular, internal jugular chain, and supraclavicular nodes did not demonstrate any abnormal lymph nodes or masses.  The carotid pulse was palpable bilaterally.  Palpation of the thyroid was soft and smooth, with no nodules or goiter appreciated.  The trachea was mobile and midline.  Nose - External contour is symmetric, no gross deflection or scars.  Nasal mucosa is pink and moist with no abnormal mucus.  The septum was midline and non-obstructive, turbinates of normal size and position.  No polyps,  masses, or purulence noted on examination.    Procedure: Cerumen Disimpaction  Diagnosis: Cerumen Impaction    Procedure and Findings  Ears - On examination of the ears, I found that the  ears were impacted with dense cerumen obscuring visualization of the right TM.  Therefore, I positioned the patient and I used the binocular surgical microscope to assist in visualization of the affected ear(s).  I began by using a cerumen loop to gently lift the edges of the cerumen mass away from the walls of the external canal.  Once I did this, I was able to suction away fragments of wax and debris using suction.  Once the mass was loose enough, the entire plug was pulled from the canal with microforceps.  The tympanic membrane was intact without sign of perforation or middle ear effusion and minimal ear canal trauma.       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 He had cerumen impaction on the right, which was cleared. I reassured him that this is a reasonably common and benign condition often thought to be due to cold water exposure. Return as needed for cerumen removal.     Patient to follow up with Primary Care provider regarding elevated blood pressure.    Dr. Yuliana Manzano MD  Otolaryngology  [facility]        Again, thank you for allowing me to participate in the care of your patient.        Sincerely,        Yuliana Manzano MD

## 2018-06-07 NOTE — MR AVS SNAPSHOT
After Visit Summary   6/7/2018    Gaudencio Avila    MRN: [id number]           Patient Information     Date Of Birth          1950        Visit Information        Provider Department      6/7/2018 12:30 PM Yuliana Manzano MD [facility]        Today's Diagnoses     Osteoma of external ear canal    -  1    Impacted cerumen of right ear          Care Instructions    Scheduling Information  To schedule your CT/MRI scan, please contact [facility] at [phone] OR [facility] at [phone]    To schedule your Surgery, please contact our Specialty Schedulers at [phone]    ** If a CT scan or biopsy were ordered/done, Dr. Manzano will need to see you back in clinic to go over your biopsy results or CT/MRI scan. He will go over the images from your scan with you and discuss treatment based on your results.     ENT Clinic Locations Clinic Hours Telephone Number     [facility]  [address] [place] [address]   E/O Thursday:      7:30am -- 4:00pm   To schedule/reschedule an appointment with   Dr. Manzano,   please contact our   Specialty Scheduling Department at:     [phone]       [facility]  [address] [place] [address]     Monday:              12:00pm -- 4:00pm    Tuesday:               8:30am -- 4:30pm    Wednesday:        12:00pm -- 4:00pm    E/O Thursday:        8:00am - 4:30pm           Urgent Care Locations Clinic Hours Telephone Numbers     [facility]  [address]  [place] [address]     Monday-Friday:     11:00am - 9:00pm    Saturday-Sunday:  9:00am - 5:00pm   [phone]     [facility] [place]  [address] [place] [address]     Monday-Friday:      5:00pm - 9:00pm     Saturday-Sunday:  9:00am - 5:00pm   [phone]               Follow-ups after your visit        Who to contact     If you have questions or need follow up information about today's clinic visit or your schedule please contact  "[facility] directly at [phone].  Normal or non-critical lab and imaging results will be communicated to you by MyChart, letter or phone within 4 business days after the clinic has received the results. If you do not hear from us within 7 days, please contact the clinic through MyChart or phone. If you have a critical or abnormal lab result, we will notify you by phone as soon as possible.  Submit refill requests through [employer] or call your pharmacy and they will forward the refill request to us. Please allow 3 business days for your refill to be completed.          Additional Information About Your Visit        Care EveryWhere ID     This is your Care EveryWhere ID. This could be used by other organizations to access your [place] medical records  [id number]        Your Vitals Were     Pulse Respirations Height Pulse Oximetry BMI (Body Mass Index)       69 24 1.626 m (5' 4\") 93% 34.84 kg/m2        Blood Pressure from Last 3 Encounters:   06/07/18 155/85   06/05/18 (!) 146/96   05/15/18 128/86    Weight from Last 3 Encounters:   06/07/18 92.1 kg (203 lb)   06/05/18 92.1 kg (203 lb)   05/15/18 92.3 kg (203 lb 6.4 oz)              We Performed the Following     Remove [facility]        Primary Care Provider Office Phone # Fax #    Tanya Renee [phone]       [address] [facility] [facility] [address]        Goals        General    Medical (pt-stated)     Notes - Note created  12/18/2015 12:27 PM by Pooja Campbell, RN    Patient/mom will state understanding of S/S of worsening respiratory status and appropriate interventions, such as, calling clinic or seeking emergency care.       Medical (pt-stated)     Notes - Note created  12/18/2015 12:28 PM by Pooja Campbell, RN    Patient/mom will state understanding regarding interventions to use to maintain stable respiratory status, such as, appropriate nutrition and hydration, appropriate medication/inhaler/nebulizer use.          Equal " Access to Services     [facility]: Hadii yvette broderick dav Larose, waaxda luqadaha, qaybta kaalluna evertsummercatherine alpesh alex cancinobrucelucy pathak. So [facility] [phone].    ATENCIÓN: Si habla yoni, tiene a wang disposición servicios gratuitos de asistencia lingüística. Llame al [phone].    We comply with applicable federal civil rights laws and [place] laws. We do not discriminate on the basis of race, color, national origin, age, disability, sex, sexual orientation, or gender identity.            Thank you!     Thank you for choosing [facility]  for your care. Our goal is always to provide you with excellent care. Hearing back from our patients is one way we can continue to improve our services. Please take a few minutes to complete the written survey that you may receive in the mail after your visit with us. Thank you!             Your Updated Medication List - Protect others around you: Learn how to safely use, store and throw away your medicines at www.disposemymeds.org.          This list is accurate as of 6/7/18  2:38 PM.  Always use your most recent med list.                   Brand Name Dispense Instructions for use Diagnosis    * albuterol (2.5 MG/3ML) 0.083% neb solution     225 mL    TAKE 3 MLS BY NEBULIZATION EVERY 4 HOURS AS NEEDED    Pulmonary emphysema, unspecified emphysema type (H)       * albuterol 108 (90 Base) MCG/ACT Inhaler    PROAIR HFA    1 Inhaler    Inhale 1-2 puffs into the lungs every 4 hours as needed    Pulmonary emphysema, unspecified emphysema type (H)       aspirin 81 MG tablet      Take 1 tablet by mouth daily.        * atenolol 25 MG tablet    TENORMIN    90 tablet    Take 1 tablet (25 mg) by mouth daily    Hypertension goal BP (blood pressure) < 150/90       * atenolol 50 MG tablet    TENORMIN    30 tablet    Take 1 tablet (50 mg) by mouth daily    Hypertension goal BP (blood pressure) < 150/90       atorvastatin 10 MG tablet    LIPITOR    90 tablet     Take 1 tablet (10 mg) by mouth daily    Hyperlipidemia LDL goal <130       cholecalciferol 1000 UNIT tablet    vitamin D3     Take 1 tablet (1,000 Units) by mouth daily        divalproex sodium extended-release 500 MG 24 hr tablet    DEPAKOTE ER    180 tablet    TAKE 2 TABLETS (1,000 MG) BY MOUTH DAILY    Traumatic brain injury, without loss of consciousness, subsequent encounter       fluticasone-salmeterol 500-50 MCG/DOSE diskus inhaler    ADVAIR DISKUS    3 Inhaler    INHALE 1 PUFF INTO THE LUNGS TWICE DAILY    Pulmonary emphysema, unspecified emphysema type (H)       levothyroxine 25 MCG tablet    SYNTHROID/LEVOTHROID    90 tablet    TAKE 1 TABLET (25 MCG) BY MOUTH DAILY    Acquired hypothyroidism       potassium chloride 10 MEQ tablet    KLOR-CON    90 tablet    Take 1 tablet (10 mEq) by mouth daily    Hypertension goal BP (blood pressure) < 150/90       tiotropium 18 MCG capsule    SPIRIVA HANDIHALER    90 capsule    Inhale 1 capsule (18 mcg) into the lungs daily    Pulmonary emphysema, unspecified emphysema type (H)       triamterene-hydrochlorothiazide 75-50 MG per tablet    MAXZIDE    45 tablet    TAKE ONE-HALF TABLET BY MOUTH ONCE DAILY    Hypertension goal BP (blood pressure) < 150/90       trimethoprim-polymyxin b ophthalmic solution    POLYTRIM    2 mL    Place 1 drop into the right eye 3 times daily for 7 days    Blepharitis of eyelid of right eye, unspecified eyelid, unspecified type       * Notice:  This list has 4 medication(s) that are the same as other medications prescribed for you. Read the directions carefully, and ask your doctor or other care provider to review them with you.

## 2018-08-07 DIAGNOSIS — I10 HYPERTENSION GOAL BP (BLOOD PRESSURE) < 150/90: ICD-10-CM

## 2018-08-08 RX ORDER — ATENOLOL 50 MG/1
TABLET ORAL
Qty: 10 TABLET | Refills: 0 | Status: SHIPPED | OUTPATIENT
Start: 2018-08-08 | End: 2018-09-20

## 2018-08-08 NOTE — TELEPHONE ENCOUNTER
"Pending Prescriptions:                       Disp   Refills    atenolol (TENORMIN) 50 MG tablet [Pharmac*30 tab*0            Sig: TAKE 1 TABLET (50 MG) BY MOUTH DAILY NEED TO           SCHEDULE BP CHECK FOR FURTHER REFILLS    Routing refill request to provider for review/approval because:  Patient's dose was increased 6/5/18 and was supposed to recheck BP in 1 month.  Patient already got [place] refill on 7/10/18. Thank you.    Em Carlson RN    Requested Prescriptions   Pending Prescriptions Disp Refills     atenolol (TENORMIN) 50 MG tablet [Pharmacy Med Name: ATENOLOL 50 MG TABLET] 30 tablet 0     Sig: TAKE 1 TABLET (50 MG) BY MOUTH DAILY NEED TO SCHEDULE BP CHECK FOR FURTHER REFILLS    Beta-Blockers Protocol Failed    8/8/2018  7:23 AM       Failed - Blood pressure under 140/90 in past 12 months    BP Readings from Last 3 Encounters:   06/07/18 155/85   06/05/18 (!) 146/96   05/15/18 128/86                Passed - Patient is age 6 or older       Passed - Recent (12 mo) or future (30 days) visit within the authorizing provider's specialty    Patient had office visit in the last 12 months or has a visit in the next 30 days with authorizing provider or within the authorizing provider's specialty.  See \"Patient Info\" tab in inbasket, or \"Choose Columns\" in Meds & Orders section of the refill encounter.              "

## 2018-08-08 NOTE — TELEPHONE ENCOUNTER
Signed Prescriptions:                        Disp   Refills    atenolol (TENORMIN) 50 MG tablet           10 tab*0        Sig: TAKE 1 TABLET (50 MG) BY MOUTH DAILY NEED TO SCHEDULE           BP CHECK FOR FURTHER REFILLS  Authorizing Provider: [address]

## 2018-09-20 ENCOUNTER — NURSING HOME VISIT (OUTPATIENT)
Dept: GERIATRICS | Facility: CLINIC | Age: 68
End: 2018-09-20
Payer: COMMERCIAL

## 2018-09-20 VITALS
TEMPERATURE: 98.5 F | HEART RATE: 72 BPM | RESPIRATION RATE: 19 BRPM | DIASTOLIC BLOOD PRESSURE: 80 MMHG | OXYGEN SATURATION: 97 % | SYSTOLIC BLOOD PRESSURE: 135 MMHG | BODY MASS INDEX: 32.17 KG/M2 | WEIGHT: 187.4 LBS

## 2018-09-20 DIAGNOSIS — S22.41XD CLOSED FRACTURE OF MULTIPLE RIBS OF RIGHT SIDE WITH ROUTINE HEALING, SUBSEQUENT ENCOUNTER: ICD-10-CM

## 2018-09-20 DIAGNOSIS — I10 BENIGN ESSENTIAL HYPERTENSION: ICD-10-CM

## 2018-09-20 DIAGNOSIS — D72.829 LEUKOCYTOSIS, UNSPECIFIED TYPE: ICD-10-CM

## 2018-09-20 DIAGNOSIS — S06.9X0D TRAUMATIC BRAIN INJURY, WITHOUT LOSS OF CONSCIOUSNESS, SUBSEQUENT ENCOUNTER: Primary | ICD-10-CM

## 2018-09-20 DIAGNOSIS — R13.13 PHARYNGEAL DYSPHAGIA: ICD-10-CM

## 2018-09-20 DIAGNOSIS — G47.00 INSOMNIA, UNSPECIFIED TYPE: ICD-10-CM

## 2018-09-20 DIAGNOSIS — J43.9 PULMONARY EMPHYSEMA, UNSPECIFIED EMPHYSEMA TYPE (H): ICD-10-CM

## 2018-09-20 DIAGNOSIS — E78.5 HYPERLIPIDEMIA LDL GOAL <130: ICD-10-CM

## 2018-09-20 DIAGNOSIS — E03.9 HYPOTHYROIDISM, UNSPECIFIED TYPE: ICD-10-CM

## 2018-09-20 DIAGNOSIS — K59.00 CONSTIPATION, UNSPECIFIED CONSTIPATION TYPE: ICD-10-CM

## 2018-09-20 DIAGNOSIS — S42.001D CLOSED NONDISPLACED FRACTURE OF RIGHT CLAVICLE WITH ROUTINE HEALING, UNSPECIFIED PART OF CLAVICLE, SUBSEQUENT ENCOUNTER: ICD-10-CM

## 2018-09-20 DIAGNOSIS — R29.898 RIGHT ARM WEAKNESS: ICD-10-CM

## 2018-09-20 DIAGNOSIS — D75.839 THROMBOCYTOSIS: ICD-10-CM

## 2018-09-20 PROCEDURE — 99310 SBSQ NF CARE HIGH MDM 45: CPT | Performed by: NURSE PRACTITIONER

## 2018-09-20 RX ORDER — SENNA LEAF EXTRACT 176MG/5ML
176 SYRUP ORAL 2 TIMES DAILY PRN
COMMUNITY
End: 2018-12-07

## 2018-09-20 RX ORDER — ATORVASTATIN CALCIUM 10 MG/1
10 TABLET, FILM COATED ORAL DAILY
COMMUNITY
End: 2020-02-04

## 2018-09-20 RX ORDER — IPRATROPIUM BROMIDE AND ALBUTEROL SULFATE 2.5; .5 MG/3ML; MG/3ML
1 SOLUTION RESPIRATORY (INHALATION) 4 TIMES DAILY
COMMUNITY
End: 2020-02-04
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
## 2018-09-22 ENCOUNTER — TRANSFERRED RECORDS (OUTPATIENT)
Dept: HEALTH INFORMATION MANAGEMENT | Facility: CLINIC | Age: 68
End: 2018-09-22

## 2018-09-23 ENCOUNTER — TELEPHONE (OUTPATIENT)
Dept: GERIATRICS | Facility: CLINIC | Age: 68
End: 2018-09-23

## 2018-09-23 NOTE — TELEPHONE ENCOUNTER
Patient currently treated with z pack for suspected pneumonia. Also received Rocephin 1 gm IM on 9/22. At that time left leg swollen and warm - had doppler US, negative for clots. Today redness and swelling worsened, more pain.   Lab Results   Component Value Date    WBC 12.2 12/31/2015    WBC 15.5 12/16/2015       PLAN  Continue rocephin. [place] area of redness on leg.   CBC with diff tomorrow.   Rocephin 1 gm IM now and tomorrow morning  Update primary NP to patient status tomorrow.     Electronically signed by SATINDER Camacho, GNP

## 2018-09-24 ENCOUNTER — TRANSFERRED RECORDS (OUTPATIENT)
Dept: HEALTH INFORMATION MANAGEMENT | Facility: CLINIC | Age: 68
End: 2018-09-24

## 2018-09-24 ENCOUNTER — NURSING HOME VISIT (OUTPATIENT)
Dept: GERIATRICS | Facility: CLINIC | Age: 68
End: 2018-09-24
Payer: COMMERCIAL

## 2018-09-24 ENCOUNTER — RECORDS - HEALTHEAST (OUTPATIENT)
Dept: LAB | Facility: CLINIC | Age: 68
End: 2018-09-24

## 2018-09-24 VITALS
BODY MASS INDEX: 32.17 KG/M2 | OXYGEN SATURATION: 92 % | WEIGHT: 187.4 LBS | TEMPERATURE: 98.5 F | SYSTOLIC BLOOD PRESSURE: 154 MMHG | HEART RATE: 89 BPM | DIASTOLIC BLOOD PRESSURE: 91 MMHG | RESPIRATION RATE: 17 BRPM

## 2018-09-24 DIAGNOSIS — I10 BENIGN ESSENTIAL HYPERTENSION: ICD-10-CM

## 2018-09-24 DIAGNOSIS — L03.116 CELLULITIS OF LEFT LOWER EXTREMITY: ICD-10-CM

## 2018-09-24 DIAGNOSIS — E87.5 HYPERKALEMIA: ICD-10-CM

## 2018-09-24 DIAGNOSIS — J43.9 PULMONARY EMPHYSEMA, UNSPECIFIED EMPHYSEMA TYPE (H): Primary | ICD-10-CM

## 2018-09-24 LAB
ANION GAP SERPL CALCULATED.3IONS-SCNC: 15 MMOL/L (ref 5–18)
ANION GAP SERPL CALCULATED.3IONS-SCNC: 15 MMOL/L (ref 5–18)
BUN SERPL-MCNC: 23 MG/DL (ref 8–22)
BUN SERPL-MCNC: 23 MG/DL (ref 8–22)
CALCIUM SERPL-MCNC: 8.5 MG/DL (ref 8.5–10.5)
CALCIUM SERPL-MCNC: 8.5 MG/DL (ref 8.5–10.5)
CHLORIDE BLD-SCNC: 104 MMOL/L (ref 98–107)
CHLORIDE SERPLBLD-SCNC: 104 MMOL/L (ref 98–107)
CO2 SERPL-SCNC: 19 MMOL/L (ref 22–31)
CO2 SERPL-SCNC: 19 MMOL/L (ref 22–31)
CREAT SERPL-MCNC: 0.53 MG/DL (ref 0.7–1.3)
CREAT SERPL-MCNC: 0.53 MG/DL (ref 0.7–1.3)
ERYTHROCYTE [DISTWIDTH] IN BLOOD BY AUTOMATED COUNT: 14.6 % (ref 11–14.5)
ERYTHROCYTE [DISTWIDTH] IN BLOOD BY AUTOMATED COUNT: 14.6 % (ref 11–14.5)
GFR SERPL CREATININE-BSD FRML MDRD: >60 ML/MIN/1.73M2
GFR SERPL CREATININE-BSD FRML MDRD: >60 ML/MIN/1.73M2
GLUCOSE BLD-MCNC: 78 MG/DL (ref 70–125)
GLUCOSE SERPL-MCNC: 78 MG/DL (ref 70–125)
HCT VFR BLD AUTO: 37.7 % (ref 40–54)
HCT VFR BLD AUTO: 37.7 % (ref 40–54)
HEMOGLOBIN: 12 G/DL (ref 14–18)
HGB BLD-MCNC: 12 G/DL (ref 14–18)
MCH RBC QN AUTO: 31.3 PG (ref 27–34)
MCH RBC QN AUTO: 31.3 PG (ref 27–34)
MCHC RBC AUTO-ENTMCNC: 31.8 G/DL (ref 32–36)
MCHC RBC AUTO-ENTMCNC: 31.8 G/DL (ref 32–36)
MCV RBC AUTO: 98 FL (ref 80–100)
MCV RBC AUTO: 98 FL (ref 80–100)
PLATELET # BLD AUTO: 389 THOU/UL (ref 140–440)
PLATELET # BLD AUTO: 389 THOU/UL (ref 140–440)
PMV BLD AUTO: 11.8 FL (ref 8.5–12.5)
POTASSIUM BLD-SCNC: 5.8 MMOL/L (ref 3.5–5)
POTASSIUM SERPL-SCNC: 5.8 MMOL/L (ref 3.5–5)
RBC # BLD AUTO: 3.83 MILL/UL (ref 4.4–6.2)
RBC # BLD AUTO: 3.83 MILL/UL (ref 4.4–6.2)
SODIUM SERPL-SCNC: 138 MMOL/L (ref 136–145)
SODIUM SERPL-SCNC: 138 MMOL/L (ref 136–145)
WBC # BLD AUTO: 13.5 THOU/UL (ref 4–11)
WBC: 13.5 THOU/UL (ref 4–11)

## 2018-09-24 PROCEDURE — 99309 SBSQ NF CARE MODERATE MDM 30: CPT | Performed by: NURSE PRACTITIONER

## 2018-09-24 RX ORDER — CEPHALEXIN 250 MG/5ML
500 POWDER, FOR SUSPENSION ORAL 4 TIMES DAILY
COMMUNITY
Start: 2018-09-25 | End: 2019-03-14

## 2018-09-24 RX ORDER — CEFTRIAXONE SODIUM 1 G/1
1000 INJECTION, POWDER, FOR SOLUTION INTRAMUSCULAR; INTRAVENOUS ONCE
COMMUNITY
End: 2018-10-09

## 2018-09-24 NOTE — PROGRESS NOTES
"[facility]    Chief Complaint   Patient presents with     WILL     [place] Medical Record Number:  [id number]    HPI:    Gaudencio Avila is a 68 year old  (1950), who is being seen today for an episodic care visit at [facility] .  HPI information obtained from: facility chart records, facility staff, patient report and [facility] chart review. Today's concern is:    Chronic Obstructive Pulmonary Disease. Exacerbation with acute respiratory failure requiring intubation with extubation 8/24/18 during hospitalization. Also treated for potential aspiration pneumonia with Unasyn during rehab stay 8/28/18 through 9/19/18. On 9/22/18, had a temperature of 100.2 F and 101.1 F. On-call MD NP updated. Left lower extremity noted to be warm and painful. Chest x-ray and left lower extremity ultrasound ordered. A chest x-ray revealed \"Questionable atelectasis or pneumonia in the left lung base\". On-call MD ordered Azithromycin and Rocephin IM. On-call NP ordered Rocephin on 9/23/18 and 9/24/18. Today, staff reports resident has no fevers. Per patient report, denies shortness of breath. States left leg feels \"terrible\". Still able to walk with therapies.     Left Lower Extremity Cellulitis. On 9/22/18, had a temperature of 100.2 F and 101.1 F. On-call MD NP updated. Left lower extremity noted to be warm and painful. Chest x-ray and left lower extremity ultrasound ordered. Left lower extremity negative for DVT. Today, patient reports his left leg feels \"terrible\".     Hypertension. Upon review of blood pressure over the past 5 days, systolic range from 122-165. Diastolic range from 80-97.     Hyperkalemia. Potassium 5.8 on 9/24/18. Previous Potassium 3.8 on 9/17/18.     ALLERGIES: Interferons  Past Medical, Surgical, Family and Social History reviewed and updated in [facility].    Current Outpatient Prescriptions   Medication Sig Dispense Refill     ACETAMINOPHEN  mg by Gastric Tube route " every 6 hours as needed for pain       ATENOLOL PO 50 mg by Gastric Tube route       atorvastatin (LIPITOR) 10 MG tablet 10 mg by Gastric Tube route daily       AZITHROMYCIN PO Take 250 mg by mouth daily       docusate (COLACE) 50 MG/5ML liquid 100 mg by Gastronomy tube route 2 times daily        enoxaparin (LOVENOX) 40 MG/0.4ML injection Inject 40 mg Subcutaneous 2 times daily       fluticasone-salmeterol (ADVAIR) 500-50 MCG/DOSE diskus inhaler Inhale 1 puff into the lungs 2 times daily       ipratropium - albuterol 0.5 mg/2.5 mg/3 mL (DUONEB) 0.5-2.5 (3) MG/3ML neb solution Take 1 vial by nebulization every 4 hours as needed for shortness of breath / dyspnea or wheezing       LEVOTHYROXINE SODIUM PO 25 mcg by Gastric Tube route daily       Senna 176 MG/5ML SYRP 176 mg by Gastric Tube route 2 times daily as needed       sterile water (preservative free) SOLN 10 mL with cefTRIAXone 1 GM SOLR 1,000 mg vial Inject 1,000 mg into the vein once       tiotropium (SPIRIVA HANDIHALER) 18 MCG capsule Inhale 1 capsule (18 mcg) into the lungs daily 90 capsule 3     TRAZODONE HCL PO 50 mg by Gastric Tube route nightly as needed for sleep       valproic acid (DEPAKENE) 250 MG/5ML SOLN syrup 250 mg by Gastric Tube route 2 times daily       Medications reviewed:  Medications reconciled to facility chart and changes were made to reflect current medications as identified as above med list. Below are the changes that were made:   Medications stopped since last EPIC medication reconciliation:   There are no discontinued medications.    Medications started since last [facility] medication reconciliation:  No orders of the defined types were placed in this encounter.    REVIEW OF SYSTEMS:  4 point ROS including Respiratory, CV, GI and , other than that noted in the HPI,  is negative    Physical Exam:  BP (!) 154/91  Pulse 89  Temp 98.5  F (36.9  C)  Resp 17  Wt 187 lb 6.4 oz (85 kg)  SpO2 92%  BMI 32.17 kg/m2  GENERAL APPEARANCE:   "Alert, in no distress  ENT:  Mouth and posterior oropharynx normal, moist mucous membranes  EYES:  EOM, conjunctivae, lids, pupils and irises normal  RESP:  respiratory effort and palpation of chest normal, no respiratory distress, crackles noted in upper lobe. Cleared with coughing.  CV:  Palpation and auscultation of heart done , regular rate and rhythm, no murmur, rub, or gallop  ABDOMEN:  normal bowel sounds, soft, nontender, no hepatosplenomegaly or other masses. PEG tube  M/S:   Active movement of bilateral upper and lower extremities.  SKIN:  Trace edema in LLE foot and shin. Slight erythema noted within markings of pen. Warm to touch. Non-tender.  NEURO:   Cranial nerves 2-12 are normal tested and grossly at patient's baseline  PSYCH:  oriented to person    Recent Labs:   9/19/18  WBC 11.69  RBC 3.90  Hgb 12.5  MCV 97.7  MCH 32.1  MCHC 32.8  RDW 14.3  Plt Ct 496  MPV 11.2     9/17/18  Sodium 137  Potassium 3.8  Chloride 101  CO2 26  Anion Gap 11  BUN 17  Creatinine 0.52  Calcium 9.0  eGFR 158    Assessment/Plan:  Chronic Obstructive Pulmonary Disease. Exacerbation with acute respiratory failure requiring intubation with extubation 8/24/18 during hospitalization. Also treated for potential aspiration pneumonia with Unasyn during rehab stay 8/28/18 through 9/19/18. Temperature as high as 101.1 F on 9/22/18. Chest x-ray on 9/22/18 reveals \"questionable atelectasis or pneumonia in left lung base\". Received 3 doses of Rocephin and started on Azithromycin. Last chest x-ray performed during hospitalization on 9/17/18 revealed \"...Strandy opacities in the right lung base likely representing atelectasis. Scarring in the peripheral left midlung, stable\". Patient received a Nebulizer just prior to today's visit. Is at high risk for aspiration, but given no increased shortness of breath, fatigue, hypoxia or cough, would favor atelectasis versus pneumonia. Will discontinue Azithromycin. Schedule DuoNebs in addition to as " needed. Also on Fluticasone-Salmeterol. Will discontinue Tiotropium while on DuoNebs. Monitor respiratory status closely.     Left Lower Extremity Cellulitis. Ultrasound negative for DVT. Remains on Enoxaparin for DVT prophylaxis. Received 3 doses of Rocephin and Azithromycin as noted above. Warmth noted in LLE with pain reported by patient, but non-tender upon palpation. No open areas or drainage. Slight erythema well within borders of pen marking. Likely cause of fever 101.1 F on 9/22/18. Will order Cephalexin 500 mg PO QID x 4 days to start tomorrow as Rocephin was already administered today.     Hypertension. Most sbp readings ~ 130-140. Continue Atenolol as ordered.    Hyperkalemia. Based on elevated Potassium, ordered Kayexalate 15 g PO x 1 today. Repeat BMP on 9/25/18. Have asked dietician to review labs as water flushes will likely need to be adjusted based on BUN and Potassium.    Electronically signed by  SATINDER Parra CNP

## 2018-09-24 NOTE — LETTER
"    9/24/2018        RE: Gaudencio Avila  [address]  [facility] [address]        [facility]    Chief Complaint   Patient presents with     RECHECK     [facility] Medical Record Number:  [id number]    HPI:    Gaudencio Avila is a 68 year old  (1950), who is being seen today for an episodic care visit at [facility] .  HPI information obtained from: facility chart records, facility staff, patient report and [facility] chart review. Today's concern is:    Chronic Obstructive Pulmonary Disease. Exacerbation with acute respiratory failure requiring intubation with extubation 8/24/18 during hospitalization. Also treated for potential aspiration pneumonia with Unasyn during rehab stay 8/28/18 through 9/19/18. On 9/22/18, had a temperature of 100.2 F and 101.1 F. On-call MD NP updated. Left lower extremity noted to be warm and painful. Chest x-ray and left lower extremity ultrasound ordered. A chest x-ray revealed \"Questionable atelectasis or pneumonia in the left lung base\". On-call MD ordered Azithromycin and Rocephin IM. On-call NP ordered Rocephin on 9/23/18 and 9/24/18. Today, staff reports resident has no fevers. Per patient report, denies shortness of breath. States left leg feels \"terrible\". Still able to walk with therapies.     Left Lower Extremity Cellulitis. On 9/22/18, had a temperature of 100.2 F and 101.1 F. On-call MD NP updated. Left lower extremity noted to be warm and painful. Chest x-ray and left lower extremity ultrasound ordered. Left lower extremity negative for DVT. Today, patient reports his left leg feels \"terrible\".     Hypertension. Upon review of blood pressure over the past 5 days, systolic range from 122-165. Diastolic range from 80-97.     Hyperkalemia. Potassium 5.8 on 9/24/18. Previous Potassium 3.8 on 9/17/18.     ALLERGIES: Interferons  Past Medical, Surgical, Family and Social History reviewed and updated in [facility].    Current Outpatient " Prescriptions   Medication Sig Dispense Refill     ACETAMINOPHEN  mg by Gastric Tube route every 6 hours as needed for pain       ATENOLOL PO 50 mg by Gastric Tube route       atorvastatin (LIPITOR) 10 MG tablet 10 mg by Gastric Tube route daily       AZITHROMYCIN PO Take 250 mg by mouth daily       docusate (COLACE) 50 MG/5ML liquid 100 mg by Gastronomy tube route 2 times daily        enoxaparin (LOVENOX) 40 MG/0.4ML injection Inject 40 mg Subcutaneous 2 times daily       fluticasone-salmeterol (ADVAIR) 500-50 MCG/DOSE diskus inhaler Inhale 1 puff into the lungs 2 times daily       ipratropium - albuterol 0.5 mg/2.5 mg/3 mL (DUONEB) 0.5-2.5 (3) MG/3ML neb solution Take 1 vial by nebulization every 4 hours as needed for shortness of breath / dyspnea or wheezing       LEVOTHYROXINE SODIUM PO 25 mcg by Gastric Tube route daily       Senna 176 MG/5ML SYRP 176 mg by Gastric Tube route 2 times daily as needed       sterile water (preservative free) SOLN 10 mL with cefTRIAXone 1 GM SOLR 1,000 mg vial Inject 1,000 mg into the vein once       tiotropium (SPIRIVA HANDIHALER) 18 MCG capsule Inhale 1 capsule (18 mcg) into the lungs daily 90 capsule 3     TRAZODONE HCL PO 50 mg by Gastric Tube route nightly as needed for sleep       valproic acid (DEPAKENE) 250 MG/5ML SOLN syrup 250 mg by Gastric Tube route 2 times daily       Medications reviewed:  Medications reconciled to facility chart and changes were made to reflect current medications as identified as above med list. Below are the changes that were made:   Medications stopped since last EPIC medication reconciliation:   There are no discontinued medications.    Medications started since last [facility] medication reconciliation:  No orders of the defined types were placed in this encounter.    REVIEW OF SYSTEMS:  4 point ROS including Respiratory, CV, GI and , other than that noted in the HPI,  is negative    Physical Exam:  BP (!) 154/91  Pulse 89  Temp 98.5  F  "(36.9  C)  Resp 17  Wt 187 lb 6.4 oz (85 kg)  SpO2 92%  BMI 32.17 kg/m2  GENERAL APPEARANCE:  Alert, in no distress  ENT:  Mouth and posterior oropharynx normal, moist mucous membranes  EYES:  EOM, conjunctivae, lids, pupils and irises normal  RESP:  respiratory effort and palpation of chest normal, no respiratory distress, crackles noted in upper lobe. Cleared with coughing.  CV:  Palpation and auscultation of heart done , regular rate and rhythm, no murmur, rub, or gallop  ABDOMEN:  normal bowel sounds, soft, nontender, no hepatosplenomegaly or other masses. PEG tube  M/S:   Active movement of bilateral upper and lower extremities.  SKIN:   Trace edema in LLE foot and shin. Slight erythema noted within markings of pen. Warm to touch. Non-tender.  NEURO:   Cranial nerves 2-12 are normal tested and grossly at patient's baseline  PSYCH:  oriented to person    Recent Labs:   9/19/18  WBC 11.69  RBC 3.90  Hgb 12.5  MCV 97.7  MCH 32.1  MCHC 32.8  RDW 14.3  Plt Ct 496  MPV 11.2     9/17/18  Sodium 137  Potassium 3.8  Chloride 101  CO2 26  Anion Gap 11  BUN 17  Creatinine 0.52  Calcium 9.0  eGFR 158    Assessment/Plan:  Chronic Obstructive Pulmonary Disease. Exacerbation with acute respiratory failure requiring intubation with extubation 8/24/18 during hospitalization. Also treated for potential aspiration pneumonia with Unasyn during rehab stay 8/28/18 through 9/19/18. Temperature as high as 101.1 F on 9/22/18. Chest x-ray on 9/22/18 reveals \"questionable atelectasis or pneumonia in left lung base\". Received 3 doses of Rocephin and started on Azithromycin. Last chest x-ray performed during hospitalization on 9/17/18 revealed \"...Strandy opacities in the right lung base likely representing atelectasis. Scarring in the peripheral left midlung, stable\". Patient received a Nebulizer just prior to today's visit. Is at high risk for aspiration, but given no increased shortness of breath, fatigue, hypoxia or cough, would " favor atelectasis versus pneumonia. Will discontinue Azithromycin. Schedule DuoNebs in addition to as needed. Also on Fluticasone-Salmeterol. Will discontinue Tiotropium while on DuoNebs. Monitor respiratory status closely.     Left Lower Extremity Cellulitis. Ultrasound negative for DVT. Remains on Enoxaparin for DVT prophylaxis. Received 3 doses of Rocephin and Azithromycin as noted above. Warmth noted in LLE with pain reported by patient, but non-tender upon palpation. No open areas or drainage. Slight erythema well within borders of pen marking. Likely cause of fever 101.1 F on 9/22/18. Will order Cephalexin 500 mg PO QID x 4 days to start tomorrow as Rocephin was already administered today.     Hypertension. Most sbp readings ~ 130-140. Continue Atenolol as ordered.    Hyperkalemia. Based on elevated Potassium, ordered Kayexalate 15 g PO x 1 today. Repeat BMP on 9/25/18. Have asked dietician to review labs as water flushes will likely need to be adjusted based on BUN and Potassium.    Electronically signed by  SATINDER Parra CNP           Sincerely,        SATINDER Parra CNP

## 2018-09-25 ENCOUNTER — RECORDS - HEALTHEAST (OUTPATIENT)
Dept: LAB | Facility: CLINIC | Age: 68
End: 2018-09-25

## 2018-09-25 ENCOUNTER — NURSING HOME VISIT (OUTPATIENT)
Dept: GERIATRICS | Facility: CLINIC | Age: 68
End: 2018-09-25
Payer: COMMERCIAL

## 2018-09-25 ENCOUNTER — TRANSFERRED RECORDS (OUTPATIENT)
Dept: HEALTH INFORMATION MANAGEMENT | Facility: CLINIC | Age: 68
End: 2018-09-25

## 2018-09-25 VITALS
WEIGHT: 186.9 LBS | DIASTOLIC BLOOD PRESSURE: 78 MMHG | RESPIRATION RATE: 17 BRPM | BODY MASS INDEX: 32.08 KG/M2 | SYSTOLIC BLOOD PRESSURE: 124 MMHG | HEART RATE: 83 BPM | TEMPERATURE: 98.1 F | OXYGEN SATURATION: 93 %

## 2018-09-25 DIAGNOSIS — L03.115 CELLULITIS OF RIGHT LOWER EXTREMITY: Primary | ICD-10-CM

## 2018-09-25 DIAGNOSIS — S06.9X9S TRAUMATIC BRAIN INJURY WITH LOSS OF CONSCIOUSNESS, SEQUELA (H): ICD-10-CM

## 2018-09-25 DIAGNOSIS — R53.81 PHYSICAL DECONDITIONING: ICD-10-CM

## 2018-09-25 DIAGNOSIS — G47.00 INSOMNIA, UNSPECIFIED TYPE: ICD-10-CM

## 2018-09-25 DIAGNOSIS — I10 BENIGN ESSENTIAL HYPERTENSION: ICD-10-CM

## 2018-09-25 DIAGNOSIS — G40.909 SEIZURE DISORDER (H): ICD-10-CM

## 2018-09-25 DIAGNOSIS — J44.9 CHRONIC OBSTRUCTIVE PULMONARY DISEASE, UNSPECIFIED COPD TYPE (H): ICD-10-CM

## 2018-09-25 DIAGNOSIS — E03.9 HYPOTHYROIDISM, UNSPECIFIED TYPE: ICD-10-CM

## 2018-09-25 DIAGNOSIS — R13.10 DYSPHAGIA, UNSPECIFIED TYPE: ICD-10-CM

## 2018-09-25 DIAGNOSIS — S42.001D CLOSED DISPLACED FRACTURE OF RIGHT CLAVICLE WITH ROUTINE HEALING, UNSPECIFIED PART OF CLAVICLE, SUBSEQUENT ENCOUNTER: ICD-10-CM

## 2018-09-25 DIAGNOSIS — S22.41XD CLOSED FRACTURE OF MULTIPLE RIBS OF RIGHT SIDE WITH ROUTINE HEALING, SUBSEQUENT ENCOUNTER: ICD-10-CM

## 2018-09-25 DIAGNOSIS — K59.01 SLOW TRANSIT CONSTIPATION: ICD-10-CM

## 2018-09-25 DIAGNOSIS — E78.5 HYPERLIPIDEMIA, UNSPECIFIED HYPERLIPIDEMIA TYPE: ICD-10-CM

## 2018-09-25 LAB
ANION GAP SERPL CALCULATED.3IONS-SCNC: 10 MMOL/L (ref 5–18)
ANION GAP SERPL CALCULATED.3IONS-SCNC: 10 MMOL/L (ref 5–18)
BUN SERPL-MCNC: 26 MG/DL (ref 8–22)
BUN SERPL-MCNC: 26 MG/DL (ref 8–22)
CALCIUM SERPL-MCNC: 8.5 MG/DL (ref 8.5–10.5)
CALCIUM SERPL-MCNC: 8.5 MG/DL (ref 8.5–10.5)
CHLORIDE BLD-SCNC: 104 MMOL/L (ref 98–107)
CHLORIDE SERPLBLD-SCNC: 104 MMOL/L (ref 98–107)
CO2 SERPL-SCNC: 27 MMOL/L (ref 22–31)
CO2 SERPL-SCNC: 27 MMOL/L (ref 22–31)
CREAT SERPL-MCNC: 0.54 MG/DL (ref 0.7–1.3)
CREAT SERPL-MCNC: 0.54 MG/DL (ref 0.7–1.3)
GFR SERPL CREATININE-BSD FRML MDRD: >60 ML/MIN/1.73M2
GFR SERPL CREATININE-BSD FRML MDRD: >60 ML/MIN/1.73M2
GLUCOSE BLD-MCNC: 109 MG/DL (ref 70–125)
GLUCOSE SERPL-MCNC: 109 MG/DL (ref 70–125)
POTASSIUM BLD-SCNC: 4.9 MMOL/L (ref 3.5–5)
POTASSIUM SERPL-SCNC: 4.9 MMOL/L (ref 3.5–5)
SODIUM SERPL-SCNC: 141 MMOL/L (ref 136–145)
SODIUM SERPL-SCNC: 141 MMOL/L (ref 136–145)

## 2018-09-25 PROCEDURE — 99306 1ST NF CARE HIGH MDM 50: CPT | Performed by: INTERNAL MEDICINE
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
## 2018-09-27 ENCOUNTER — NURSING HOME VISIT (OUTPATIENT)
Dept: GERIATRICS | Facility: CLINIC | Age: 68
End: 2018-09-27
Payer: COMMERCIAL

## 2018-09-27 ENCOUNTER — TRANSFERRED RECORDS (OUTPATIENT)
Dept: HEALTH INFORMATION MANAGEMENT | Facility: CLINIC | Age: 68
End: 2018-09-27

## 2018-09-27 ENCOUNTER — RECORDS - HEALTHEAST (OUTPATIENT)
Dept: LAB | Facility: CLINIC | Age: 68
End: 2018-09-27

## 2018-09-27 VITALS
RESPIRATION RATE: 17 BRPM | WEIGHT: 192 LBS | HEIGHT: 65 IN | TEMPERATURE: 97.3 F | BODY MASS INDEX: 31.99 KG/M2 | OXYGEN SATURATION: 94 % | SYSTOLIC BLOOD PRESSURE: 150 MMHG | DIASTOLIC BLOOD PRESSURE: 97 MMHG | HEART RATE: 69 BPM

## 2018-09-27 DIAGNOSIS — L03.116 CELLULITIS OF LEFT LOWER EXTREMITY: Primary | ICD-10-CM

## 2018-09-27 DIAGNOSIS — I10 BENIGN ESSENTIAL HYPERTENSION: ICD-10-CM

## 2018-09-27 DIAGNOSIS — E87.5 HYPERKALEMIA: ICD-10-CM

## 2018-09-27 LAB
ANION GAP SERPL CALCULATED.3IONS-SCNC: 13 MMOL/L (ref 5–18)
ANION GAP SERPL CALCULATED.3IONS-SCNC: 13 MMOL/L (ref 5–18)
BUN SERPL-MCNC: 17 MG/DL (ref 8–22)
BUN SERPL-MCNC: 17 MG/DL (ref 8–22)
CALCIUM SERPL-MCNC: 9 MG/DL (ref 8.5–10.5)
CALCIUM SERPL-MCNC: 9 MG/DL (ref 8.5–10.5)
CHLORIDE BLD-SCNC: 101 MMOL/L (ref 98–107)
CHLORIDE SERPLBLD-SCNC: 101 MMOL/L (ref 98–107)
CO2 SERPL-SCNC: 25 MMOL/L (ref 22–31)
CO2 SERPL-SCNC: 25 MMOL/L (ref 22–31)
CREAT SERPL-MCNC: 0.55 MG/DL (ref 0.7–1.3)
CREAT SERPL-MCNC: 0.55 MG/DL (ref 0.7–1.3)
GFR SERPL CREATININE-BSD FRML MDRD: >60 ML/MIN/1.73M2
GFR SERPL CREATININE-BSD FRML MDRD: >60 ML/MIN/1.73M2
GLUCOSE BLD-MCNC: 105 MG/DL (ref 70–125)
GLUCOSE SERPL-MCNC: 105 MG/DL (ref 70–125)
POTASSIUM BLD-SCNC: 4.9 MMOL/L (ref 3.5–5)
POTASSIUM SERPL-SCNC: 4.9 MMOL/L (ref 3.5–5)
SODIUM SERPL-SCNC: 139 MMOL/L (ref 136–145)
SODIUM SERPL-SCNC: 139 MMOL/L (ref 136–145)

## 2018-09-27 PROCEDURE — 99309 SBSQ NF CARE MODERATE MDM 30: CPT | Performed by: NURSE PRACTITIONER

## 2018-09-27 NOTE — LETTER
9/27/2018        RE: Gaudencio Avila  [address]  [facility] [address]        [facility]    Chief Complaint   Patient presents with     RECHECK     [facility] Medical Record Number:  [id number]    HPI:    Gaudencio Avila is a 68 year old  (1950), who is being seen today for an episodic care visit at [facility] .  HPI information obtained from: facility chart records, facility staff, patient report and [facility] chart review. Today's concern is:    Left Lower Extremity Cellulitis. On 9/22/18, had a temperature of 100.2 F and 101.1 F. On-call MD NP updated. Left lower extremity noted to be warm and painful. Chest x-ray and left lower extremity ultrasound ordered. Left lower extremity negative for DVT. Received 3 doses of Rocephin and was started on Cephalexin 9/25/18 x 4 days. Today, patient reports some pain in the left leg. Improved since earlier this week.     Hyperkalemia. Potassium 5.8 on 9/24/18. Received Kayexalate 15 g x 1 on 9/24/18. Repeat Potassium 4.9 on 9/25/18 and 9/27/18. No reports of palpitations.     Hypertension. Upon review of blood pressure over the past 5 days, systolic range from 119-165. Diastolic range from 74-97.    ALLERGIES: Interferons  Past Medical, Surgical, Family and Social History reviewed and updated in [facility].    Current Outpatient Prescriptions   Medication Sig Dispense Refill     ACETAMINOPHEN  mg by Gastric Tube route every 6 hours as needed for pain       ATENOLOL PO 50 mg by Gastric Tube route       atorvastatin (LIPITOR) 10 MG tablet 10 mg by Gastric Tube route daily       cephalexin (KEFLEX) 250 MG/5ML suspension 500 mg by Gastric Tube route 4 times daily       docusate (COLACE) 50 MG/5ML liquid 100 mg by Gastronomy tube route 2 times daily        enoxaparin (LOVENOX) 40 MG/0.4ML injection Inject 40 mg Subcutaneous 2 times daily       fluticasone-salmeterol (ADVAIR) 500-50 MCG/DOSE diskus inhaler Inhale 1 puff into  "the lungs 2 times daily       ipratropium - albuterol 0.5 mg/2.5 mg/3 mL (DUONEB) 0.5-2.5 (3) MG/3ML neb solution Take 1 vial by nebulization 3 times daily And q4h PRN       LEVOTHYROXINE SODIUM PO 25 mcg by Gastric Tube route daily       Senna 176 MG/5ML SYRP 176 mg by Gastric Tube route 2 times daily as needed       sterile water (preservative free) SOLN 10 mL with cefTRIAXone 1 GM SOLR 1,000 mg vial Inject 1,000 mg into the vein once       TRAZODONE HCL PO 50 mg by Gastric Tube route nightly as needed for sleep       valproic acid (DEPAKENE) 250 MG/5ML SOLN syrup 500 mg by Gastric Tube route 2 times daily        Medications reviewed:  Medications reconciled to facility chart and changes were made to reflect current medications as identified as above med list. Below are the changes that were made:   Medications stopped since last EPIC medication reconciliation:   There are no discontinued medications.    Medications started since last [facility] medication reconciliation:  No orders of the defined types were placed in this encounter.    REVIEW OF SYSTEMS:  4 point ROS including Respiratory, CV, GI and , other than that noted in the HPI,  is negative    Physical Exam:  BP (!) 150/97  Pulse 69  Temp 97.3  F (36.3  C)  Resp 17  Ht 5' 5\" (1.651 m)  Wt 192 lb (87.1 kg)  SpO2 94%  BMI 31.95 kg/m2  GENERAL APPEARANCE:  Alert, in no distress  ENT:  Mouth and posterior oropharynx normal, moist mucous membranes  EYES:  EOM, conjunctivae, lids, pupils and irises normal  RESP:  respiratory effort and palpation of chest normal, no respiratory distress, lung sounds clear to auscultation.  CV:  Palpation and auscultation of heart done , regular rate and rhythm, no murmur, rub, or gallop  ABDOMEN:  normal bowel sounds, soft, nontender, no hepatosplenomegaly or other masses. PEG tube  M/S:   Active movement of bilateral upper and lower extremities.  SKIN:  Trace edema in LLE foot and shin. Slight erythema noted within markings " of pen. Non-tender. No warmth.  NEURO:   Cranial nerves 2-12 are normal tested and grossly at patient's baseline  PSYCH:  oriented to person     Recent Labs:   Last Comprehensive Metabolic Panel:  Sodium   Date Value Ref Range Status   09/25/2018 141 136 - 145 mmol/L Final     Potassium   Date Value Ref Range Status   09/25/2018 4.9 3.5 - 5.0 mmol/L Final     Chloride   Date Value Ref Range Status   09/25/2018 104 98 - 107 mmol/L Final     Carbon Dioxide   Date Value Ref Range Status   09/25/2018 27 22 - 31 mmol/L Final     Anion Gap   Date Value Ref Range Status   09/25/2018 10 5 - 18 mmol/L Final     Glucose   Date Value Ref Range Status   09/25/2018 109 70 - 125 mg/dL Final     Urea Nitrogen   Date Value Ref Range Status   09/25/2018 26 (H) 8 - 22 mg/dL Final     Creatinine   Date Value Ref Range Status   09/25/2018 0.54 (L) 0.70 - 1.30 mg/dL Final     GFR Estimate   Date Value Ref Range Status   09/25/2018 >60 >60 ml/min/1.73m2 Final     Calcium   Date Value Ref Range Status   09/25/2018 8.5 8.5 - 10.5 mg/dL Final     Lab Results   Component Value Date    WBC 13.5 09/24/2018     Lab Results   Component Value Date    RBC 3.83 09/24/2018     Lab Results   Component Value Date    HGB 12.0 09/24/2018     Lab Results   Component Value Date    HCT 37.7 09/24/2018     Lab Results   Component Value Date    MCV 98 09/24/2018     Lab Results   Component Value Date    MCH 31.3 09/24/2018     Lab Results   Component Value Date    MCHC 31.8 09/24/2018     Lab Results   Component Value Date    RDW 14.6 09/24/2018     Lab Results   Component Value Date     09/24/2018     Assessment/Plan:  Left Lower Extremity Cellulitis. Improving with antibiotics. Less erythema and no warmth as compared to 9/24/18 visit. Continue Cephalexin as ordered until 9/28/18.     Hyperkalemia. Resolved. Continue to monitor Potassium periodically.     Hypertension. Most readings <140/90. Continue Atenolol as ordered.    Electronically signed  by  SATINDER Parra CNP          Sincerely,        SATINDER Parra CNP

## 2018-09-27 NOTE — PROGRESS NOTES
[facility]    Chief Complaint   Patient presents with     WILL     [facility] Medical Record Number:  [id number]    HPI:    Gaudencio Avila is a 68 year old  (1950), who is being seen today for an episodic care visit at [facility] .  HPI information obtained from: facility chart records, facility staff, patient report and [facility] chart review. Today's concern is:    Left Lower Extremity Cellulitis. On 9/22/18, had a temperature of 100.2 F and 101.1 F. On-call MD NP updated. Left lower extremity noted to be warm and painful. Chest x-ray and left lower extremity ultrasound ordered. Left lower extremity negative for DVT. Received 3 doses of Rocephin and was started on Cephalexin 9/25/18 x 4 days. Today, patient reports some pain in the left leg. Improved since earlier this week.     Hyperkalemia. Potassium 5.8 on 9/24/18. Received Kayexalate 15 g x 1 on 9/24/18. Repeat Potassium 4.9 on 9/25/18 and 9/27/18. No reports of palpitations.     Hypertension. Upon review of blood pressure over the past 5 days, systolic range from 119-165. Diastolic range from 74-97.    ALLERGIES: Interferons  Past Medical, Surgical, Family and Social History reviewed and updated in [facility].    Current Outpatient Prescriptions   Medication Sig Dispense Refill     ACETAMINOPHEN  mg by Gastric Tube route every 6 hours as needed for pain       ATENOLOL PO 50 mg by Gastric Tube route       atorvastatin (LIPITOR) 10 MG tablet 10 mg by Gastric Tube route daily       cephalexin (KEFLEX) 250 MG/5ML suspension 500 mg by Gastric Tube route 4 times daily       docusate (COLACE) 50 MG/5ML liquid 100 mg by Gastronomy tube route 2 times daily        enoxaparin (LOVENOX) 40 MG/0.4ML injection Inject 40 mg Subcutaneous 2 times daily       fluticasone-salmeterol (ADVAIR) 500-50 MCG/DOSE diskus inhaler Inhale 1 puff into the lungs 2 times daily       ipratropium - albuterol 0.5 mg/2.5 mg/3 mL (DUONEB) 0.5-2.5 (3)  "MG/3ML neb solution Take 1 vial by nebulization 3 times daily And q4h PRN       LEVOTHYROXINE SODIUM PO 25 mcg by Gastric Tube route daily       Senna 176 MG/5ML SYRP 176 mg by Gastric Tube route 2 times daily as needed       sterile water (preservative free) SOLN 10 mL with cefTRIAXone 1 GM SOLR 1,000 mg vial Inject 1,000 mg into the vein once       TRAZODONE HCL PO 50 mg by Gastric Tube route nightly as needed for sleep       valproic acid (DEPAKENE) 250 MG/5ML SOLN syrup 500 mg by Gastric Tube route 2 times daily        Medications reviewed:  Medications reconciled to facility chart and changes were made to reflect current medications as identified as above med list. Below are the changes that were made:   Medications stopped since last EPIC medication reconciliation:   There are no discontinued medications.    Medications started since last [facility] medication reconciliation:  No orders of the defined types were placed in this encounter.    REVIEW OF SYSTEMS:  4 point ROS including Respiratory, CV, GI and , other than that noted in the HPI,  is negative    Physical Exam:  BP (!) 150/97  Pulse 69  Temp 97.3  F (36.3  C)  Resp 17  Ht 5' 5\" (1.651 m)  Wt 192 lb (87.1 kg)  SpO2 94%  BMI 31.95 kg/m2  GENERAL APPEARANCE:  Alert, in no distress  ENT:  Mouth and posterior oropharynx normal, moist mucous membranes  EYES:  EOM, conjunctivae, lids, pupils and irises normal  RESP:  respiratory effort and palpation of chest normal, no respiratory distress, lung sounds clear to auscultation.  CV:  Palpation and auscultation of heart done , regular rate and rhythm, no murmur, rub, or gallop  ABDOMEN:  normal bowel sounds, soft, nontender, no hepatosplenomegaly or other masses. PEG tube  M/S:   Active movement of bilateral upper and lower extremities.  SKIN:  Trace edema in LLE foot and shin. Slight erythema noted within markings of pen. Non-tender. No warmth.  NEURO:   Cranial nerves 2-12 are normal tested and grossly at " patient's baseline  PSYCH:  oriented to person     Recent Labs:   Last Comprehensive Metabolic Panel:  Sodium   Date Value Ref Range Status   09/25/2018 141 136 - 145 mmol/L Final     Potassium   Date Value Ref Range Status   09/25/2018 4.9 3.5 - 5.0 mmol/L Final     Chloride   Date Value Ref Range Status   09/25/2018 104 98 - 107 mmol/L Final     Carbon Dioxide   Date Value Ref Range Status   09/25/2018 27 22 - 31 mmol/L Final     Anion Gap   Date Value Ref Range Status   09/25/2018 10 5 - 18 mmol/L Final     Glucose   Date Value Ref Range Status   09/25/2018 109 70 - 125 mg/dL Final     Urea Nitrogen   Date Value Ref Range Status   09/25/2018 26 (H) 8 - 22 mg/dL Final     Creatinine   Date Value Ref Range Status   09/25/2018 0.54 (L) 0.70 - 1.30 mg/dL Final     GFR Estimate   Date Value Ref Range Status   09/25/2018 >60 >60 ml/min/1.73m2 Final     Calcium   Date Value Ref Range Status   09/25/2018 8.5 8.5 - 10.5 mg/dL Final     Lab Results   Component Value Date    WBC 13.5 09/24/2018     Lab Results   Component Value Date    RBC 3.83 09/24/2018     Lab Results   Component Value Date    HGB 12.0 09/24/2018     Lab Results   Component Value Date    HCT 37.7 09/24/2018     Lab Results   Component Value Date    MCV 98 09/24/2018     Lab Results   Component Value Date    MCH 31.3 09/24/2018     Lab Results   Component Value Date    MCHC 31.8 09/24/2018     Lab Results   Component Value Date    RDW 14.6 09/24/2018     Lab Results   Component Value Date     09/24/2018     Assessment/Plan:  Left Lower Extremity Cellulitis. Improving with antibiotics. Less erythema and no warmth as compared to 9/24/18 visit. Continue Cephalexin as ordered until 9/28/18.     Hyperkalemia. Resolved. Continue to monitor Potassium periodically.     Hypertension. Most readings <140/90. Continue Atenolol as ordered.    Electronically signed by  SATINDER Parra CNP

## 2018-10-01 NOTE — PROGRESS NOTES
[facility]    Chief Complaint   Patient presents with     WILL     [place] Medical Record Number:  [id number]    HPI:    Gaudencio Avila is a 68 year old  (1950), who is being seen today for an episodic care visit at [facility] .  HPI information obtained from: facility chart records, facility staff, patient report and [facility] chart review. Today's concern is:    Leukocytosis. Elevated during most recent hospitalization. Thought to be secondary to steroid use. During TCU stay, treated for LLE cellulitis 9/22/18 through 9/28/18. Today, WBC 12.9. Previous WBC 13.5 on 9/24/18. Today, patient reports left foot pain with therapy. Denies fever, chills or abdominal pain.     Multiple Right 5th and 7th Rib Fractures/Right Mid-Shaft Clavicle Fracture. Seen by Ortho on 10/1/18. Right clavicle fracture noted to be healing well. Stiff. Recommended continued Physical Therapy to improve ROM. 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 Cognitive testing performed by Occupational Therapy. CPT Score 4.2. Grabbed candy bars out of an office at the facility and needed to be reminded he could not take any food in by mouth.    ALLERGIES: Interferons  Past Medical, Surgical, Family and Social History reviewed and updated in [facility].    Current Outpatient Prescriptions   Medication Sig Dispense Refill     ACETAMINOPHEN  mg by Gastric Tube route every 6 hours as needed for pain       ATENOLOL PO 50 mg by Gastric Tube route       atorvastatin (LIPITOR) 10 MG tablet 10 mg by Gastric Tube route daily       docusate (COLACE) 50 MG/5ML liquid 100 mg by Gastronomy tube route 2 times daily        enoxaparin (LOVENOX) 40 MG/0.4ML injection Inject 40 mg Subcutaneous 2 times daily       fluticasone-salmeterol (ADVAIR) 500-50 MCG/DOSE diskus inhaler Inhale 1 puff into the lungs 2 times daily       ipratropium - albuterol  0.5 mg/2.5 mg/3 mL (DUONEB) 0.5-2.5 (3) MG/3ML neb solution Take 1 vial by nebulization 3 times daily And q4h PRN       LEVOTHYROXINE SODIUM PO 25 mcg by Gastric Tube route daily       Senna 176 MG/5ML SYRP 176 mg by Gastric Tube route 2 times daily as needed       sterile water (preservative free) SOLN 10 mL with cefTRIAXone 1 GM SOLR 1,000 mg vial Inject 1,000 mg into the vein once       TRAZODONE HCL PO 50 mg by Gastric Tube route nightly as needed for sleep       valproic acid (DEPAKENE) 250 MG/5ML SOLN syrup 500 mg by Gastric Tube route 2 times daily        Medications reviewed:  Medications reconciled to facility chart and changes were made to reflect current medications as identified as above med list. Below are the changes that were made:   Medications stopped since last EPIC medication reconciliation:   There are no discontinued medications.    Medications started since last [facility] medication reconciliation:  No orders of the defined types were placed in this encounter.    REVIEW OF SYSTEMS:  4 point ROS including Respiratory, CV, GI and , other than that noted in the HPI,  is negative    Physical Exam:  /86  Pulse 81  Temp 97.7  F (36.5  C)  Resp 18  Wt 189 lb 14.4 oz (86.1 kg)  SpO2 92%  BMI 31.6 kg/m2  GENERAL APPEARANCE:  Alert, in no distress  ENT:  Mouth and posterior oropharynx normal, moist mucous membranes  EYES:  EOM, conjunctivae, lids, pupils and irises normal  RESP:  respiratory effort and palpation of chest normal, no respiratory distress, lungs clear to auscultation  CV:  Palpation and auscultation of heart done , regular rate and rhythm, no murmur, rub, or gallop  ABDOMEN:  normal bowel sounds, soft, nontender, no hepatosplenomegaly or other masses. PEG tube  M/S:   Active movement of bilateral upper and lower extremities. Boots on BLE  SKIN:  Inspection of skin and subcutaneous tissue baseline, Palpation of skin and subcutaneous tissue baseline  NEURO:   Cranial nerves 2-12 are  normal tested and grossly at patient's baseline  PSYCH:  oriented to person    Recent Labs:   Last Comprehensive Metabolic Panel:  Sodium   Date Value Ref Range Status   09/27/2018 139 136 - 145 mmol/L Final     Potassium   Date Value Ref Range Status   09/27/2018 4.9 3.5 - 5.0 mmol/L Final     Chloride   Date Value Ref Range Status   09/27/2018 101 98 - 107 mmol/L Final     Carbon Dioxide   Date Value Ref Range Status   09/27/2018 25 22 - 31 mmol/L Final     Anion Gap   Date Value Ref Range Status   09/27/2018 13 5 - 18 mmol/L Final     Glucose   Date Value Ref Range Status   09/27/2018 105 70 - 125 mg/dL Final     Urea Nitrogen   Date Value Ref Range Status   09/27/2018 17 8 - 22 mg/dL Final     Creatinine   Date Value Ref Range Status   09/27/2018 0.55 (L) 0.70 - 1.30 mg/dL Final     GFR Estimate   Date Value Ref Range Status   09/27/2018 >60 >60 ml/min/1.73m2 Final     Calcium   Date Value Ref Range Status   09/27/2018 9.0 8.5 - 10.5 mg/dL Final     Lab Results   Component Value Date    WBC 13.5 09/24/2018     Lab Results   Component Value Date    RBC 3.83 09/24/2018     Lab Results   Component Value Date    HGB 12.0 09/24/2018     Lab Results   Component Value Date    HCT 37.7 09/24/2018     Lab Results   Component Value Date    MCV 98 09/24/2018     Lab Results   Component Value Date    MCH 31.3 09/24/2018     Lab Results   Component Value Date    MCHC 31.8 09/24/2018     Lab Results   Component Value Date    RDW 14.6 09/24/2018     Lab Results   Component Value Date     09/24/2018     Assessment/Plan:  Leukocytosis. Unclear etiology. Previously thought to be related to steroid use during hospitalization, but is no longer prescribed steroids. Treated for cellulitis 9/22/18 through 9/28/18 and today, left shin reveals no signs of infection. No loose stools reported. All other vital signs stable. WBC is trending down from 13.5 on 9/24/18. Will repeat CBC with differential next week to ensure continued  downward trend. Monitor vital signs daily.     Multiple Right 5th and 7th Rib Fractures/Right Mid-Shaft Clavicle Fracture. Last seen by Dr. Vang on 10/1/18 with instructions to follow-up in 6 weeks. Recommended further Physical and OccupationalTherapy for shoulder stiffness. 10 lbs weight bearing restriction to RUE. Acetaminophen available for pain control. Enoxaparin ordered for DVT prophylaxis. May consider discontinuation of Enoxaparin if ambulation distances increase during TCU stay.     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 Based on cognitive scores, recommend 24 hour supervision. PTA lived with mother who is unable to manage tube feedings. Transitions Coordinator working with patient and family to apply for Medicaid to consider AL versus LTC placement.     Electronically signed by  SATINDER Parra CNP

## 2018-10-04 ENCOUNTER — TRANSFERRED RECORDS (OUTPATIENT)
Dept: HEALTH INFORMATION MANAGEMENT | Facility: CLINIC | Age: 68
End: 2018-10-04

## 2018-10-04 ENCOUNTER — NURSING HOME VISIT (OUTPATIENT)
Dept: GERIATRICS | Facility: CLINIC | Age: 68
End: 2018-10-04
Payer: COMMERCIAL

## 2018-10-04 ENCOUNTER — RECORDS - HEALTHEAST (OUTPATIENT)
Dept: LAB | Facility: CLINIC | Age: 68
End: 2018-10-04

## 2018-10-04 VITALS
SYSTOLIC BLOOD PRESSURE: 152 MMHG | BODY MASS INDEX: 31.6 KG/M2 | WEIGHT: 189.9 LBS | HEART RATE: 81 BPM | RESPIRATION RATE: 18 BRPM | TEMPERATURE: 97.7 F | OXYGEN SATURATION: 92 % | DIASTOLIC BLOOD PRESSURE: 86 MMHG

## 2018-10-04 DIAGNOSIS — S42.001D CLOSED NONDISPLACED FRACTURE OF RIGHT CLAVICLE WITH ROUTINE HEALING, UNSPECIFIED PART OF CLAVICLE, SUBSEQUENT ENCOUNTER: ICD-10-CM

## 2018-10-04 DIAGNOSIS — S06.9X0D TRAUMATIC BRAIN INJURY, WITHOUT LOSS OF CONSCIOUSNESS, SUBSEQUENT ENCOUNTER: ICD-10-CM

## 2018-10-04 DIAGNOSIS — D72.829 LEUKOCYTOSIS, UNSPECIFIED TYPE: Primary | ICD-10-CM

## 2018-10-04 LAB
ANION GAP SERPL CALCULATED.3IONS-SCNC: 15 MMOL/L (ref 5–18)
ANION GAP SERPL CALCULATED.3IONS-SCNC: 15 MMOL/L (ref 5–18)
BASOPHILS # BLD AUTO: 0.1 THOU/UL (ref 0–0.2)
BASOPHILS NFR BLD AUTO: 1 % (ref 0–2)
BUN SERPL-MCNC: 17 MG/DL (ref 8–22)
BUN SERPL-MCNC: 17 MG/DL (ref 8–22)
CALCIUM SERPL-MCNC: 9 MG/DL (ref 8.5–10.5)
CALCIUM SERPL-MCNC: 9 MG/DL (ref 8.5–10.5)
CHLORIDE BLD-SCNC: 106 MMOL/L (ref 98–107)
CHLORIDE SERPLBLD-SCNC: 106 MMOL/L (ref 98–107)
CO2 SERPL-SCNC: 19 MMOL/L (ref 22–31)
CO2 SERPL-SCNC: 19 MMOL/L (ref 22–31)
CREAT SERPL-MCNC: 0.54 MG/DL (ref 0.7–1.3)
CREAT SERPL-MCNC: 0.54 MG/DL (ref 0.7–1.3)
DIFFERENTIAL: ABNORMAL
EOSINOPHIL # BLD AUTO: 0.2 THOU/UL (ref 0–0.4)
EOSINOPHIL NFR BLD AUTO: 2 % (ref 0–6)
ERYTHROCYTE [DISTWIDTH] IN BLOOD BY AUTOMATED COUNT: 15.1 % (ref 11–14.5)
ERYTHROCYTE [DISTWIDTH] IN BLOOD BY AUTOMATED COUNT: 15.1 % (ref 11–14.5)
GFR SERPL CREATININE-BSD FRML MDRD: >60 ML/MIN/1.73M2
GFR SERPL CREATININE-BSD FRML MDRD: >60 ML/MIN/1.73M2
GLUCOSE BLD-MCNC: 79 MG/DL (ref 70–125)
GLUCOSE SERPL-MCNC: 79 MG/DL (ref 70–125)
HCT VFR BLD AUTO: 39.6 % (ref 40–54)
HCT VFR BLD AUTO: 39.6 % (ref 40–54)
HEMOGLOBIN: 12.2 G/DL (ref 14–18)
HGB BLD-MCNC: 12.2 G/DL (ref 14–18)
LYMPHOCYTES # BLD AUTO: 7.2 THOU/UL (ref 0.8–4.4)
LYMPHOCYTES NFR BLD AUTO: 56 % (ref 20–40)
MCH RBC QN AUTO: 31.5 PG (ref 27–34)
MCH RBC QN AUTO: 31.5 PG (ref 27–34)
MCHC RBC AUTO-ENTMCNC: 30.8 G/DL (ref 32–36)
MCHC RBC AUTO-ENTMCNC: 30.8 G/DL (ref 32–36)
MCV RBC AUTO: 102 FL (ref 80–100)
MCV RBC AUTO: 102 FL (ref 80–100)
MONOCYTES # BLD AUTO: 1.3 THOU/UL (ref 0–0.9)
MONOCYTES NFR BLD AUTO: 10 % (ref 2–10)
NEUTROPHILS # BLD AUTO: 4 THOU/UL (ref 2–7.7)
NEUTROPHILS NFR BLD AUTO: 31 % (ref 50–70)
PLATELET # BLD AUTO: 11.4 THOU/UL (ref 8.5–12.5)
PLATELET # BLD AUTO: 397 THOU/UL (ref 140–440)
PMV BLD AUTO: 11.4 FL (ref 8.5–12.5)
POTASSIUM BLD-SCNC: 5.3 MMOL/L (ref 3.5–5)
POTASSIUM SERPL-SCNC: 5.3 MMOL/L (ref 3.5–5)
RBC # BLD AUTO: 3.87 MILL/UL (ref 4.4–6.2)
RBC # BLD AUTO: 3.87 MILL/UL (ref 4.4–6.2)
SODIUM SERPL-SCNC: 140 MMOL/L (ref 136–145)
SODIUM SERPL-SCNC: 140 MMOL/L (ref 136–145)
WBC # BLD AUTO: 12.9 THOU/UL (ref 4–11)
WBC: 12.9 THOU/UL (ref 4–11)

## 2018-10-04 PROCEDURE — 99309 SBSQ NF CARE MODERATE MDM 30: CPT | Performed by: NURSE PRACTITIONER

## 2018-10-04 NOTE — LETTER
10/4/2018        RE: Gaudencio Avila  [address]  [facility] [address]        [facility]    Chief Complaint   Patient presents with     RECHECK     [place] Medical Record Number:  [id number]    HPI:    Gaudencio Avila is a 68 year old  (1950), who is being seen today for an episodic care visit at [facility] .  HPI information obtained from: facility chart records, facility staff, patient report and [facility] chart review. Today's concern is:    Leukocytosis. Elevated during most recent hospitalization. Thought to be secondary to steroid use. During TCU stay, treated for LLE cellulitis 9/22/18 through 9/28/18. Today, WBC 12.9. Previous WBC 13.5 on 9/24/18. Today, patient reports left foot pain with therapy. Denies fever, chills or abdominal pain.     Multiple Right 5th and 7th Rib Fractures/Right Mid-Shaft Clavicle Fracture. Seen by Ortho on 10/1/18. Right clavicle fracture noted to be healing well. Stiff. Recommended continued Physical Therapy to improve ROM. 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 Cognitive testing performed by Occupational Therapy. CPT Score 4.2. Grabbed candy bars out of an office at the facility and needed to be reminded he could not take any food in by mouth.    ALLERGIES: Interferons  Past Medical, Surgical, Family and Social History reviewed and updated in [facility].    Current Outpatient Prescriptions   Medication Sig Dispense Refill     ACETAMINOPHEN  mg by Gastric Tube route every 6 hours as needed for pain       ATENOLOL PO 50 mg by Gastric Tube route       atorvastatin (LIPITOR) 10 MG tablet 10 mg by Gastric Tube route daily       docusate (COLACE) 50 MG/5ML liquid 100 mg by Gastronomy tube route 2 times daily        enoxaparin (LOVENOX) 40 MG/0.4ML injection Inject 40 mg Subcutaneous 2 times daily       fluticasone-salmeterol (ADVAIR) 500-50  MCG/DOSE diskus inhaler Inhale 1 puff into the lungs 2 times daily       ipratropium - albuterol 0.5 mg/2.5 mg/3 mL (DUONEB) 0.5-2.5 (3) MG/3ML neb solution Take 1 vial by nebulization 3 times daily And q4h PRN       LEVOTHYROXINE SODIUM PO 25 mcg by Gastric Tube route daily       Senna 176 MG/5ML SYRP 176 mg by Gastric Tube route 2 times daily as needed       sterile water (preservative free) SOLN 10 mL with cefTRIAXone 1 GM SOLR 1,000 mg vial Inject 1,000 mg into the vein once       TRAZODONE HCL PO 50 mg by Gastric Tube route nightly as needed for sleep       valproic acid (DEPAKENE) 250 MG/5ML SOLN syrup 500 mg by Gastric Tube route 2 times daily        Medications reviewed:  Medications reconciled to facility chart and changes were made to reflect current medications as identified as above med list. Below are the changes that were made:   Medications stopped since last EPIC medication reconciliation:   There are no discontinued medications.    Medications started since last [facility] medication reconciliation:  No orders of the defined types were placed in this encounter.    REVIEW OF SYSTEMS:  4 point ROS including Respiratory, CV, GI and , other than that noted in the HPI,  is negative    Physical Exam:  /86  Pulse 81  Temp 97.7  F (36.5  C)  Resp 18  Wt 189 lb 14.4 oz (86.1 kg)  SpO2 92%  BMI 31.6 kg/m2  GENERAL APPEARANCE:  Alert, in no distress  ENT:  Mouth and posterior oropharynx normal, moist mucous membranes  EYES:  EOM, conjunctivae, lids, pupils and irises normal  RESP:  respiratory effort and palpation of chest normal, no respiratory distress, lungs clear to auscultation  CV:  Palpation and auscultation of heart done , regular rate and rhythm, no murmur, rub, or gallop  ABDOMEN:  normal bowel sounds, soft, nontender, no hepatosplenomegaly or other masses. PEG tube  M/S:   Active movement of bilateral upper and lower extremities. Boots on BLE  SKIN:  Inspection of skin and subcutaneous  tissue baseline, Palpation of skin and subcutaneous tissue baseline  NEURO:   Cranial nerves 2-12 are normal tested and grossly at patient's baseline  PSYCH:  oriented to person    Recent Labs:   Last Comprehensive Metabolic Panel:  Sodium   Date Value Ref Range Status   09/27/2018 139 136 - 145 mmol/L Final     Potassium   Date Value Ref Range Status   09/27/2018 4.9 3.5 - 5.0 mmol/L Final     Chloride   Date Value Ref Range Status   09/27/2018 101 98 - 107 mmol/L Final     Carbon Dioxide   Date Value Ref Range Status   09/27/2018 25 22 - 31 mmol/L Final     Anion Gap   Date Value Ref Range Status   09/27/2018 13 5 - 18 mmol/L Final     Glucose   Date Value Ref Range Status   09/27/2018 105 70 - 125 mg/dL Final     Urea Nitrogen   Date Value Ref Range Status   09/27/2018 17 8 - 22 mg/dL Final     Creatinine   Date Value Ref Range Status   09/27/2018 0.55 (L) 0.70 - 1.30 mg/dL Final     GFR Estimate   Date Value Ref Range Status   09/27/2018 >60 >60 ml/min/1.73m2 Final     Calcium   Date Value Ref Range Status   09/27/2018 9.0 8.5 - 10.5 mg/dL Final     Lab Results   Component Value Date    WBC 13.5 09/24/2018     Lab Results   Component Value Date    RBC 3.83 09/24/2018     Lab Results   Component Value Date    HGB 12.0 09/24/2018     Lab Results   Component Value Date    HCT 37.7 09/24/2018     Lab Results   Component Value Date    MCV 98 09/24/2018     Lab Results   Component Value Date    MCH 31.3 09/24/2018     Lab Results   Component Value Date    MCHC 31.8 09/24/2018     Lab Results   Component Value Date    RDW 14.6 09/24/2018     Lab Results   Component Value Date     09/24/2018     Assessment/Plan:  Leukocytosis. Unclear etiology. Previously thought to be related to steroid use during hospitalization, but is no longer prescribed steroids. Treated for cellulitis 9/22/18 through 9/28/18 and today, left shin reveals no signs of infection. No loose stools reported. All other vital signs stable. WBC is  trending down from 13.5 on 9/24/18. Will repeat CBC with differential next week to ensure continued downward trend. Monitor vital signs daily.     Multiple Right 5th and 7th Rib Fractures/Right Mid-Shaft Clavicle Fracture. Last seen by Dr. Vang on 10/1/18 with instructions to follow-up in 6 weeks. Recommended further Physical and OccupationalTherapy for shoulder stiffness. 10 lbs weight bearing restriction to RUE. Acetaminophen available for pain control. Enoxaparin ordered for DVT prophylaxis. May consider discontinuation of Enoxaparin if ambulation distances increase during TCU stay.     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 Based on cognitive scores, recommend 24 hour supervision. PTA lived with mother who is unable to manage tube feedings. Transitions Coordinator working with patient and family to apply for Medicaid to consider AL versus LTC placement.     Electronically signed by  SATINDER Parra CNP        Sincerely,        SATINDER Parra CNP

## 2018-10-09 ENCOUNTER — RECORDS - HEALTHEAST (OUTPATIENT)
Dept: LAB | Facility: CLINIC | Age: 68
End: 2018-10-09

## 2018-10-09 ENCOUNTER — TRANSFERRED RECORDS (OUTPATIENT)
Dept: HEALTH INFORMATION MANAGEMENT | Facility: CLINIC | Age: 68
End: 2018-10-09

## 2018-10-09 ENCOUNTER — NURSING HOME VISIT (OUTPATIENT)
Dept: GERIATRICS | Facility: CLINIC | Age: 68
End: 2018-10-09
Payer: COMMERCIAL

## 2018-10-09 VITALS
DIASTOLIC BLOOD PRESSURE: 92 MMHG | SYSTOLIC BLOOD PRESSURE: 140 MMHG | RESPIRATION RATE: 18 BRPM | TEMPERATURE: 97.9 F | WEIGHT: 185.5 LBS | BODY MASS INDEX: 30.87 KG/M2 | OXYGEN SATURATION: 92 % | HEART RATE: 76 BPM

## 2018-10-09 DIAGNOSIS — I10 HYPERTENSION GOAL BP (BLOOD PRESSURE) < 150/90: ICD-10-CM

## 2018-10-09 DIAGNOSIS — M79.672 LEFT FOOT PAIN: Primary | ICD-10-CM

## 2018-10-09 DIAGNOSIS — E66.811 CLASS 1 OBESITY WITH SERIOUS COMORBIDITY IN ADULT, UNSPECIFIED BMI, UNSPECIFIED OBESITY TYPE: ICD-10-CM

## 2018-10-09 LAB
ANION GAP SERPL CALCULATED.3IONS-SCNC: 15 MMOL/L (ref 5–18)
ANION GAP SERPL CALCULATED.3IONS-SCNC: 15 MMOL/L (ref 5–18)
BUN SERPL-MCNC: 15 MG/DL (ref 8–22)
BUN SERPL-MCNC: 15 MG/DL (ref 8–22)
CALCIUM SERPL-MCNC: 8.9 MG/DL (ref 8.5–10.5)
CALCIUM SERPL-MCNC: 8.9 MG/DL (ref 8.5–10.5)
CHLORIDE BLD-SCNC: 103 MMOL/L (ref 98–107)
CHLORIDE SERPLBLD-SCNC: 103 MMOL/L (ref 98–107)
CO2 SERPL-SCNC: 20 MMOL/L (ref 22–31)
CO2 SERPL-SCNC: 20 MMOL/L (ref 22–31)
CREAT SERPL-MCNC: 0.53 MG/DL (ref 0.7–1.3)
CREAT SERPL-MCNC: 0.53 MG/DL (ref 0.7–1.3)
GFR SERPL CREATININE-BSD FRML MDRD: >60 ML/MIN/1.73M2
GFR SERPL CREATININE-BSD FRML MDRD: >60 ML/MIN/1.73M2
GLUCOSE BLD-MCNC: 81 MG/DL (ref 70–125)
GLUCOSE SERPL-MCNC: 81 MG/DL (ref 70–125)
POTASSIUM BLD-SCNC: 5 MMOL/L (ref 3.5–5)
POTASSIUM SERPL-SCNC: 5 MMOL/L (ref 3.5–5)
SODIUM SERPL-SCNC: 138 MMOL/L (ref 136–145)
SODIUM SERPL-SCNC: 138 MMOL/L (ref 136–145)

## 2018-10-09 PROCEDURE — 99309 SBSQ NF CARE MODERATE MDM 30: CPT | Performed by: NURSE PRACTITIONER

## 2018-10-09 NOTE — LETTER
10/9/2018        RE: Gaudencio Avila  [address]  [facility] [address]        [facility]    Chief Complaint   Patient presents with     RECHECK     [place] Medical Record Number:  [id number]  Place of Service where encounter took place:  [facility] [[id number]]    HPI:    Gaudenico Avila is a 68 year old  (1950), who is being seen today for an episodic care visit.  HPI information obtained from: facility chart records, facility staff, patient report and [facility] chart review. Today's concern is:    Left Foot Pain. Per patient report, complains of pain on the bottom of left foot. Hurts when walking. Unclear regarding specifics of what it feels like or when it started. Per Physical Therapy report, having left foot pain. Unchanged since treated for cellulitis with antibiotics. Upon review of documentation, utilizing Acetaminophen 2 times daily on average.     Hypertension. Upon review of blood pressure over the past 5 days, systolic range from 129-157. Diastolic range from 76-99. Most readings < 140/90.    Obesity. BMI 32.17 on 9/20/18. Admitted to facility on tube feedings given severe pharyngeal dysphagia. Today, dietitian requests to decrease tube feedings in an effort to promote gradual healthy weight loss. Recommended to decrease TF to Isosource 1.5 bolus of 2 cans 3x/day. incr free water flushes to 225ml QID via Peg tube to better meet fluid needs since decr in TF formula. TF will provide 2250 calories(a decrease of 375 cals), 102g pro & 2040cc total H2O daily.    ALLERGIES: Interferons  Past Medical, Surgical, Family and Social History reviewed and updated in [facility].    Current Outpatient Prescriptions   Medication Sig Dispense Refill     ACETAMINOPHEN  mg by Gastric Tube route every 6 hours as needed for pain       ATENOLOL PO 50 mg by Gastric Tube route       atorvastatin (LIPITOR) 10 MG tablet 10 mg by Gastric Tube route daily        docusate (COLACE) 50 MG/5ML liquid 100 mg by Gastronomy tube route 2 times daily        enoxaparin (LOVENOX) 40 MG/0.4ML injection Inject 40 mg Subcutaneous 2 times daily       fluticasone-salmeterol (ADVAIR) 500-50 MCG/DOSE diskus inhaler Inhale 1 puff into the lungs 2 times daily       ipratropium - albuterol 0.5 mg/2.5 mg/3 mL (DUONEB) 0.5-2.5 (3) MG/3ML neb solution Take 1 vial by nebulization 3 times daily And q4h PRN       LEVOTHYROXINE SODIUM PO 25 mcg by Gastric Tube route daily       Senna 176 MG/5ML SYRP 176 mg by Gastric Tube route 2 times daily as needed       valproic acid (DEPAKENE) 250 MG/5ML SOLN syrup 500 mg by Gastric Tube route 2 times daily        Medications reviewed:  Medications reconciled to facility chart and changes were made to reflect current medications as identified as above med list. Below are the changes that were made:   Medications stopped since last EPIC medication reconciliation:   Medications Discontinued During This Encounter   Medication Reason     sterile water (preservative free) SOLN 10 mL with cefTRIAXone 1 GM SOLR 1,000 mg vial Medication Reconciliation Clean Up     TRAZODONE HCL PO Medication Reconciliation Clean Up       Medications started since last [facility] medication reconciliation:  No orders of the defined types were placed in this encounter.    REVIEW OF SYSTEMS:  4 point ROS including Respiratory, CV, GI and , other than that noted in the HPI,  is negative    Physical Exam:  BP (!) 140/92  Pulse 76  Temp 97.9  F (36.6  C)  Resp 18  Wt 185 lb 8 oz (84.1 kg)  SpO2 92%  BMI 30.87 kg/m2  GENERAL APPEARANCE:  Alert, in no distress  ENT:  Mouth and posterior oropharynx normal, moist mucous membranes  EYES:  EOM, conjunctivae, lids, pupils and irises normal  RESP:  respiratory effort and palpation of chest normal, no respiratory distress, lungs clear to auscultation  CV:  Palpation and auscultation of heart done , regular rate and rhythm, no murmur, rub, or  gallop  ABDOMEN:  normal bowel sounds, soft, nontender, no hepatosplenomegaly or other masses. PEG tube  M/S:   Active movement of bilateral upper and lower extremities. Boots on BLE  SKIN:  Trace edema in left foot. No erythema.  NEURO:   Cranial nerves 2-12 are normal tested and grossly at patient's baseline  PSYCH:  oriented to person    Recent Labs:   Last Comprehensive Metabolic Panel:  Sodium   Date Value Ref Range Status   10/04/2018 140 136 - 145 mmol/L Final     Potassium   Date Value Ref Range Status   10/04/2018 5.3 (A) 3.5 - 5.0 mmol/L Final     Chloride   Date Value Ref Range Status   10/04/2018 106 98 - 107 mmol/L Final     Carbon Dioxide   Date Value Ref Range Status   10/04/2018 19 (A) 22 - 31 mmol/L Final     Anion Gap   Date Value Ref Range Status   10/04/2018 15 5 - 18 mmol/L Final     Glucose   Date Value Ref Range Status   10/04/2018 79 70 - 125 mg/dL Final     Urea Nitrogen   Date Value Ref Range Status   10/04/2018 17 8 - 22 mg/dL Final     Creatinine   Date Value Ref Range Status   10/04/2018 0.54 (A) 0.70 - 1.30 mg/dL Final     GFR Estimate   Date Value Ref Range Status   10/04/2018 >60 >60 mL/min/1.73m2 Final     Calcium   Date Value Ref Range Status   10/04/2018 9.0 8.5 - 10.5 mg/dL Final     Lab Results   Component Value Date    WBC 12.9 10/04/2018     Lab Results   Component Value Date    RBC 3.87 10/04/2018     Lab Results   Component Value Date    HGB 12.2 10/04/2018     Lab Results   Component Value Date    HCT 39.6 10/04/2018     Lab Results   Component Value Date     10/04/2018     Lab Results   Component Value Date    MCH 31.5 10/04/2018     Lab Results   Component Value Date    MCHC 30.8 10/04/2018     Lab Results   Component Value Date    RDW 15.1 10/04/2018     Lab Results   Component Value Date    PLT 11.4 10/04/2018     Assessment/Plan:  Left Foot Pain. Patient poor historian secondary to TBI. No obvious injury. Treated for cellulitis of LLE 9/22/18 through 9/28/18, but  no signs or symptoms of cellulitis. Will order x-ray to rule out acute finding. Continue Acetaminophen as ordered for pain control. Further plans pending x-ray results.    Hypertension. Most readings <140/90. Continue Atenolol as ordered.    Obesity. Remains NPO. Will decrease TF to Isosource 1.5 bolus of 2 cans 3x/day. Increase free water flushes to 225ml QID via PEG tube to better meet fluid needs given decrease in TF formula. TF will provide 2250 calories which is a decrease of 375 calories,, 102g pro & 2040cc total water daily as recommended by dietician.     Electronically signed by  SATINDER Parra CNP          Sincerely,        SATINDER Parra CNP

## 2018-10-09 NOTE — PROGRESS NOTES
[place] [facility]    Chief Complaint   Patient presents with     MACHELLEECK     [place] Medical Record Number:  [id number]  Place of Service where encounter took place:  [facility] [[id number]]    HPI:    Gaudencio Avila is a 68 year old  (1950), who is being seen today for an episodic care visit.  HPI information obtained from: facility chart records, facility staff, patient report and [facility] chart review. Today's concern is:    Left Foot Pain. Per patient report, complains of pain on the bottom of left foot. Hurts when walking. Unclear regarding specifics of what it feels like or when it started. Per Physical Therapy report, having left foot pain. Unchanged since treated for cellulitis with antibiotics. Upon review of documentation, utilizing Acetaminophen 2 times daily on average.     Hypertension. Upon review of blood pressure over the past 5 days, systolic range from 129-157. Diastolic range from 76-99. Most readings < 140/90.    Obesity. BMI 32.17 on 9/20/18. Admitted to facility on tube feedings given severe pharyngeal dysphagia. Today, dietitian requests to decrease tube feedings in an effort to promote gradual healthy weight loss. Recommended to decrease TF to Isosource 1.5 bolus of 2 cans 3x/day. incr free water flushes to 225ml QID via Peg tube to better meet fluid needs since decr in TF formula. TF will provide 2250 calories(a decrease of 375 cals), 102g pro & 2040cc total H2O daily.    ALLERGIES: Interferons  Past Medical, Surgical, Family and Social History reviewed and updated in [facility].    Current Outpatient Prescriptions   Medication Sig Dispense Refill     ACETAMINOPHEN  mg by Gastric Tube route every 6 hours as needed for pain       ATENOLOL PO 50 mg by Gastric Tube route       atorvastatin (LIPITOR) 10 MG tablet 10 mg by Gastric Tube route daily       docusate (COLACE) 50 MG/5ML liquid 100 mg by Gastronomy tube route 2 times daily         enoxaparin (LOVENOX) 40 MG/0.4ML injection Inject 40 mg Subcutaneous 2 times daily       fluticasone-salmeterol (ADVAIR) 500-50 MCG/DOSE diskus inhaler Inhale 1 puff into the lungs 2 times daily       ipratropium - albuterol 0.5 mg/2.5 mg/3 mL (DUONEB) 0.5-2.5 (3) MG/3ML neb solution Take 1 vial by nebulization 3 times daily And q4h PRN       LEVOTHYROXINE SODIUM PO 25 mcg by Gastric Tube route daily       Senna 176 MG/5ML SYRP 176 mg by Gastric Tube route 2 times daily as needed       valproic acid (DEPAKENE) 250 MG/5ML SOLN syrup 500 mg by Gastric Tube route 2 times daily        Medications reviewed:  Medications reconciled to facility chart and changes were made to reflect current medications as identified as above med list. Below are the changes that were made:   Medications stopped since last EPIC medication reconciliation:   Medications Discontinued During This Encounter   Medication Reason     sterile water (preservative free) SOLN 10 mL with cefTRIAXone 1 GM SOLR 1,000 mg vial Medication Reconciliation Clean Up     TRAZODONE HCL PO Medication Reconciliation Clean Up       Medications started since last [facility] medication reconciliation:  No orders of the defined types were placed in this encounter.    REVIEW OF SYSTEMS:  4 point ROS including Respiratory, CV, GI and , other than that noted in the HPI,  is negative    Physical Exam:  BP (!) 140/92  Pulse 76  Temp 97.9  F (36.6  C)  Resp 18  Wt 185 lb 8 oz (84.1 kg)  SpO2 92%  BMI 30.87 kg/m2  GENERAL APPEARANCE:  Alert, in no distress  ENT:  Mouth and posterior oropharynx normal, moist mucous membranes  EYES:  EOM, conjunctivae, lids, pupils and irises normal  RESP:  respiratory effort and palpation of chest normal, no respiratory distress, lungs clear to auscultation  CV:  Palpation and auscultation of heart done , regular rate and rhythm, no murmur, rub, or gallop  ABDOMEN:  normal bowel sounds, soft, nontender, no hepatosplenomegaly or other masses.  PEG tube  M/S:   Active movement of bilateral upper and lower extremities. Boots on BLE  SKIN:  Trace edema in left foot. No erythema.  NEURO:   Cranial nerves 2-12 are normal tested and grossly at patient's baseline  PSYCH:  oriented to person    Recent Labs:   Last Comprehensive Metabolic Panel:  Sodium   Date Value Ref Range Status   10/04/2018 140 136 - 145 mmol/L Final     Potassium   Date Value Ref Range Status   10/04/2018 5.3 (A) 3.5 - 5.0 mmol/L Final     Chloride   Date Value Ref Range Status   10/04/2018 106 98 - 107 mmol/L Final     Carbon Dioxide   Date Value Ref Range Status   10/04/2018 19 (A) 22 - 31 mmol/L Final     Anion Gap   Date Value Ref Range Status   10/04/2018 15 5 - 18 mmol/L Final     Glucose   Date Value Ref Range Status   10/04/2018 79 70 - 125 mg/dL Final     Urea Nitrogen   Date Value Ref Range Status   10/04/2018 17 8 - 22 mg/dL Final     Creatinine   Date Value Ref Range Status   10/04/2018 0.54 (A) 0.70 - 1.30 mg/dL Final     GFR Estimate   Date Value Ref Range Status   10/04/2018 >60 >60 mL/min/1.73m2 Final     Calcium   Date Value Ref Range Status   10/04/2018 9.0 8.5 - 10.5 mg/dL Final     Lab Results   Component Value Date    WBC 12.9 10/04/2018     Lab Results   Component Value Date    RBC 3.87 10/04/2018     Lab Results   Component Value Date    HGB 12.2 10/04/2018     Lab Results   Component Value Date    HCT 39.6 10/04/2018     Lab Results   Component Value Date     10/04/2018     Lab Results   Component Value Date    MCH 31.5 10/04/2018     Lab Results   Component Value Date    MCHC 30.8 10/04/2018     Lab Results   Component Value Date    RDW 15.1 10/04/2018     Lab Results   Component Value Date    PLT 11.4 10/04/2018     Assessment/Plan:  Left Foot Pain. Patient poor historian secondary to TBI. No obvious injury. Treated for cellulitis of LLE 9/22/18 through 9/28/18, but no signs or symptoms of cellulitis. Will order x-ray to rule out acute finding. Continue  Acetaminophen as ordered for pain control. Further plans pending x-ray results.    Hypertension. Most readings <140/90. Continue Atenolol as ordered.    Obesity. Remains NPO. Will decrease TF to Isosource 1.5 bolus of 2 cans 3x/day. Increase free water flushes to 225ml QID via PEG tube to better meet fluid needs given decrease in TF formula. TF will provide 2250 calories which is a decrease of 375 calories,, 102g pro & 2040cc total water daily as recommended by dietician.     Electronically signed by  SATINDER Parra CNP

## 2018-10-11 ENCOUNTER — RECORDS - HEALTHEAST (OUTPATIENT)
Dept: LAB | Facility: CLINIC | Age: 68
End: 2018-10-11

## 2018-10-11 ENCOUNTER — TRANSFERRED RECORDS (OUTPATIENT)
Dept: HEALTH INFORMATION MANAGEMENT | Facility: CLINIC | Age: 68
End: 2018-10-11

## 2018-10-11 LAB
BASOPHILS # BLD AUTO: 0.1 THOU/UL (ref 0–0.2)
BASOPHILS NFR BLD AUTO: 1 % (ref 0–2)
DIFFERENTIAL: ABNORMAL
EOSINOPHIL # BLD AUTO: 0.2 THOU/UL (ref 0–0.4)
EOSINOPHIL NFR BLD AUTO: 2 % (ref 0–6)
ERYTHROCYTE [DISTWIDTH] IN BLOOD BY AUTOMATED COUNT: 14.9 % (ref 11–14.5)
ERYTHROCYTE [DISTWIDTH] IN BLOOD BY AUTOMATED COUNT: 14.9 % (ref 11–14.5)
HCT VFR BLD AUTO: 38.5 % (ref 40–54)
HCT VFR BLD AUTO: 38.5 % (ref 40–54)
HEMOGLOBIN: 12.2 G/DL (ref 14–18)
HGB BLD-MCNC: 12.2 G/DL (ref 14–18)
LYMPHOCYTES # BLD AUTO: 8.1 THOU/UL (ref 0.8–4.4)
LYMPHOCYTES NFR BLD AUTO: 58 % (ref 20–40)
MCH RBC QN AUTO: 31.4 PG (ref 27–34)
MCH RBC QN AUTO: 31.4 PG (ref 27–34)
MCHC RBC AUTO-ENTMCNC: 31.7 G/DL (ref 32–36)
MCHC RBC AUTO-ENTMCNC: 31.7 G/DL (ref 32–36)
MCV RBC AUTO: 99 FL (ref 80–100)
MCV RBC AUTO: 99 FL (ref 80–100)
MONOCYTES # BLD AUTO: 1.2 THOU/UL (ref 0–0.9)
MONOCYTES NFR BLD AUTO: 9 % (ref 2–10)
NEUTROPHILS # BLD AUTO: 4.3 THOU/UL (ref 2–7.7)
NEUTROPHILS NFR BLD AUTO: 31 % (ref 50–70)
PLATELET # BLD AUTO: 359 THOU/UL (ref 140–440)
PLATELET # BLD AUTO: 359 THOU/UL (ref 140–440)
PMV BLD AUTO: 11.3 FL (ref 8.5–12.5)
RBC # BLD AUTO: 3.88 MILL/UL (ref 4.4–6.2)
RBC # BLD AUTO: 3.88 MILL/UL (ref 4.4–6.2)
WBC # BLD AUTO: 14 THOU/UL (ref 4–11)
WBC: 14 THOU/UL (ref 4–11)

## 2018-10-15 ENCOUNTER — NURSING HOME VISIT (OUTPATIENT)
Dept: GERIATRICS | Facility: CLINIC | Age: 68
End: 2018-10-15
Payer: COMMERCIAL

## 2018-10-15 VITALS
HEART RATE: 73 BPM | RESPIRATION RATE: 18 BRPM | HEIGHT: 65 IN | OXYGEN SATURATION: 95 % | BODY MASS INDEX: 31.36 KG/M2 | DIASTOLIC BLOOD PRESSURE: 87 MMHG | TEMPERATURE: 98.4 F | SYSTOLIC BLOOD PRESSURE: 132 MMHG | WEIGHT: 188.2 LBS

## 2018-10-15 DIAGNOSIS — R13.13 PHARYNGEAL DYSPHAGIA: Primary | ICD-10-CM

## 2018-10-15 DIAGNOSIS — D72.829 LEUKOCYTOSIS, UNSPECIFIED TYPE: ICD-10-CM

## 2018-10-15 DIAGNOSIS — I10 BENIGN ESSENTIAL HYPERTENSION: ICD-10-CM

## 2018-10-15 PROCEDURE — 99309 SBSQ NF CARE MODERATE MDM 30: CPT | Performed by: NURSE PRACTITIONER

## 2018-10-15 NOTE — PROGRESS NOTES
[place] [facility]    Chief Complaint   Patient presents with     intermediate Acute     [place] Medical Record Number:  [id number]  Place of Service where encounter took place:  [facility] [[id number]]    HPI:    Gaudencio Avila is a 68 year old  (1950), who is being seen today for an episodic care visit.  HPI information obtained from: facility chart records, facility staff, patient report and [facility] chart review. Today's concern is:    Severe Pharyngeal Dysphagia S/P PEG Tube Placement 9/14/18. Secondary to TBI.  NPO. Continues to work with Speech Therapy. Speech Therapy is requesting a repeat video swallow study.     Hypertension. Upon review of blood pressure over the past 5 days, systolic range 108-151. Diastolic range from 79-92.    Leukocytosis. VSS. Pain in left foot is improving. Elevated during most recent hospitalization. Thought to be secondary to steroid use. During TCU stay, treated for LLE cellulitis 9/22/18 through 9/28/18. WBC 14 on 10/11/8 -> 12.9 on 10/4/18 -> 13.5 on 9/24/18. Upon review of further lab values in [facility] and [facility], WBC appears to be chronically elevated as far back as 2010.      ALLERGIES: Interferons  Past Medical, Surgical, Family and Social History reviewed and updated in [facility].    Current Outpatient Prescriptions   Medication Sig Dispense Refill     ACETAMINOPHEN  mg by Gastric Tube route every 6 hours as needed for pain       ATENOLOL PO 50 mg by Gastric Tube route       atorvastatin (LIPITOR) 10 MG tablet 10 mg by Gastric Tube route daily       docusate (COLACE) 50 MG/5ML liquid 100 mg by Gastronomy tube route 2 times daily        enoxaparin (LOVENOX) 40 MG/0.4ML injection Inject 40 mg Subcutaneous 2 times daily       fluticasone-salmeterol (ADVAIR) 500-50 MCG/DOSE diskus inhaler Inhale 1 puff into the lungs 2 times daily       ipratropium - albuterol 0.5 mg/2.5 mg/3 mL (DUONEB) 0.5-2.5 (3) MG/3ML neb solution Take  "1 vial by nebulization 3 times daily And q4h PRN       LEVOTHYROXINE SODIUM PO 25 mcg by Gastric Tube route daily       Senna 176 MG/5ML SYRP 176 mg by Gastric Tube route 2 times daily as needed       valproic acid (DEPAKENE) 250 MG/5ML SOLN syrup 500 mg by Gastric Tube route 2 times daily        Medications reviewed:  Medications reconciled to facility chart and changes were made to reflect current medications as identified as above med list. Below are the changes that were made:   Medications stopped since last EPIC medication reconciliation:   There are no discontinued medications.    Medications started since last [facility] medication reconciliation:  No orders of the defined types were placed in this encounter.    REVIEW OF SYSTEMS:  Limited secondary to cognitive impairment but today pt reports no concerns    Physical Exam:  /87  Pulse 73  Temp 98.4  F (36.9  C)  Resp 18  Ht 5' 5\" (1.651 m)  Wt 188 lb 3.2 oz (85.4 kg)  SpO2 95%  BMI 31.32 kg/m2  GENERAL APPEARANCE:  Alert, in no distress  ENT:  Mouth and posterior oropharynx normal, moist mucous membranes  EYES:  EOM, conjunctivae, lids, pupils and irises normal  RESP:  respiratory effort and palpation of chest normal, no respiratory distress, lungs clear to auscultation  CV:  Palpation and auscultation of heart done , regular rate and rhythm, no murmur, rub, or gallop  ABDOMEN:  normal bowel sounds, soft, nontender, no hepatosplenomegaly or other masses. PEG tube  M/S:   Active movement of bilateral upper and lower extremities. Boots on BLE  SKIN:  Inspection of skin and subcutaneous tissue baseline, Palpation of skin and subcutaneous tissue baseline  NEURO:   Cranial nerves 2-12 are normal tested and grossly at patient's baseline  PSYCH:  oriented to person    Recent Labs:   Last Comprehensive Metabolic Panel:  Sodium   Date Value Ref Range Status   10/09/2018 138 136 - 145 mmol/L Final     Potassium   Date Value Ref Range Status   10/09/2018 5.0 3.5 " - 5.0 mmol/L Final     Chloride   Date Value Ref Range Status   10/09/2018 103 98 - 107 mmol/L Final     Carbon Dioxide   Date Value Ref Range Status   10/09/2018 20 (L) 22 - 31 mmol/L Final     Anion Gap   Date Value Ref Range Status   10/09/2018 15 5 - 18 mmol/L Final     Glucose   Date Value Ref Range Status   10/09/2018 81 70 - 125 mg/dL Final     Urea Nitrogen   Date Value Ref Range Status   10/09/2018 15 8 - 22 mg/dL Final     Creatinine   Date Value Ref Range Status   10/09/2018 0.53 (L) 0.70 - 1.30 mg/dL Final     GFR Estimate   Date Value Ref Range Status   10/09/2018 >60 >60 ml/min/1.73m2 Final     Calcium   Date Value Ref Range Status   10/09/2018 8.9 8.5 - 10.5 mg/dL Final     Lab Results   Component Value Date    WBC 14.0 10/11/2018     Lab Results   Component Value Date    RBC 3.88 10/11/2018     Lab Results   Component Value Date    HGB 12.2 10/11/2018     Lab Results   Component Value Date    HCT 38.5 10/11/2018     Lab Results   Component Value Date    MCV 99 10/11/2018     Lab Results   Component Value Date    MCH 31.4 10/11/2018     Lab Results   Component Value Date    MCHC 31.7 10/11/2018     Lab Results   Component Value Date    RDW 14.9 10/11/2018     Lab Results   Component Value Date     10/11/2018     Assessment/Plan:  Severe Pharyngeal Dysphagia S/P PEG Tube Placement 9/14/18. Secondary to TBI.  NPO. Continues to work with Speech Therapy. Will order video swallow study per ST request.    Hypertension. Most blood pressures < 140/90. Continue Atenolol as ordered.    Leukocytosis. No obvious signs of infection. VSS. Appears to be chronic. Will need outpatient follow-up. Continue to monitor periodically.     Electronically signed by  SATINDER Parra CNP

## 2018-10-15 NOTE — LETTER
10/15/2018        RE: Gaudencio Avila  [address]  [facility] [address]        [place] [facility]    Chief Complaint   Patient presents with     half-way Acute     [place] Medical Record Number:  [id number]  Place of Service where encounter took place:  [facility] [[id number]]    HPI:    Gaudencio Avila is a 68 year old  (1950), who is being seen today for an episodic care visit.  HPI information obtained from: facility chart records, facility staff, patient report and [facility] chart review. Today's concern is:    Severe Pharyngeal Dysphagia S/P PEG Tube Placement 9/14/18. Secondary to TBI.  NPO. Continues to work with Speech Therapy. Speech Therapy is requesting a repeat video swallow study.     Hypertension. Upon review of blood pressure over the past 5 days, systolic range 108-151. Diastolic range from 79-92.    Leukocytosis. VSS. Pain in left foot is improving. Elevated during most recent hospitalization. Thought to be secondary to steroid use. During TCU stay, treated for LLE cellulitis 9/22/18 through 9/28/18. WBC 14 on 10/11/8 -> 12.9 on 10/4/18 -> 13.5 on 9/24/18. Upon review of further lab values in [facility] and [facility], WBC appears to be chronically elevated as far back as 2010.      ALLERGIES: Interferons  Past Medical, Surgical, Family and Social History reviewed and updated in [facility].    Current Outpatient Prescriptions   Medication Sig Dispense Refill     ACETAMINOPHEN  mg by Gastric Tube route every 6 hours as needed for pain       ATENOLOL PO 50 mg by Gastric Tube route       atorvastatin (LIPITOR) 10 MG tablet 10 mg by Gastric Tube route daily       docusate (COLACE) 50 MG/5ML liquid 100 mg by Gastronomy tube route 2 times daily        enoxaparin (LOVENOX) 40 MG/0.4ML injection Inject 40 mg Subcutaneous 2 times daily       fluticasone-salmeterol (ADVAIR) 500-50 MCG/DOSE diskus inhaler Inhale 1 puff into the lungs 2 times daily  "      ipratropium - albuterol 0.5 mg/2.5 mg/3 mL (DUONEB) 0.5-2.5 (3) MG/3ML neb solution Take 1 vial by nebulization 3 times daily And q4h PRN       LEVOTHYROXINE SODIUM PO 25 mcg by Gastric Tube route daily       Senna 176 MG/5ML SYRP 176 mg by Gastric Tube route 2 times daily as needed       valproic acid (DEPAKENE) 250 MG/5ML SOLN syrup 500 mg by Gastric Tube route 2 times daily        Medications reviewed:  Medications reconciled to facility chart and changes were made to reflect current medications as identified as above med list. Below are the changes that were made:   Medications stopped since last EPIC medication reconciliation:   There are no discontinued medications.    Medications started since last [facility] medication reconciliation:  No orders of the defined types were placed in this encounter.    REVIEW OF SYSTEMS:  Limited secondary to cognitive impairment but today pt reports no concerns    Physical Exam:  /87  Pulse 73  Temp 98.4  F (36.9  C)  Resp 18  Ht 5' 5\" (1.651 m)  Wt 188 lb 3.2 oz (85.4 kg)  SpO2 95%  BMI 31.32 kg/m2  GENERAL APPEARANCE:  Alert, in no distress  ENT:  Mouth and posterior oropharynx normal, moist mucous membranes  EYES:  EOM, conjunctivae, lids, pupils and irises normal  RESP:  respiratory effort and palpation of chest normal, no respiratory distress, lungs clear to auscultation  CV:  Palpation and auscultation of heart done , regular rate and rhythm, no murmur, rub, or gallop  ABDOMEN:  normal bowel sounds, soft, nontender, no hepatosplenomegaly or other masses. PEG tube  M/S:   Active movement of bilateral upper and lower extremities. Boots on BLE  SKIN:  Inspection of skin and subcutaneous tissue baseline, Palpation of skin and subcutaneous tissue baseline  NEURO:   Cranial nerves 2-12 are normal tested and grossly at patient's baseline  PSYCH:  oriented to person    Recent Labs:   Last Comprehensive Metabolic Panel:  Sodium   Date Value Ref Range Status "   10/09/2018 138 136 - 145 mmol/L Final     Potassium   Date Value Ref Range Status   10/09/2018 5.0 3.5 - 5.0 mmol/L Final     Chloride   Date Value Ref Range Status   10/09/2018 103 98 - 107 mmol/L Final     Carbon Dioxide   Date Value Ref Range Status   10/09/2018 20 (L) 22 - 31 mmol/L Final     Anion Gap   Date Value Ref Range Status   10/09/2018 15 5 - 18 mmol/L Final     Glucose   Date Value Ref Range Status   10/09/2018 81 70 - 125 mg/dL Final     Urea Nitrogen   Date Value Ref Range Status   10/09/2018 15 8 - 22 mg/dL Final     Creatinine   Date Value Ref Range Status   10/09/2018 0.53 (L) 0.70 - 1.30 mg/dL Final     GFR Estimate   Date Value Ref Range Status   10/09/2018 >60 >60 ml/min/1.73m2 Final     Calcium   Date Value Ref Range Status   10/09/2018 8.9 8.5 - 10.5 mg/dL Final     Lab Results   Component Value Date    WBC 14.0 10/11/2018     Lab Results   Component Value Date    RBC 3.88 10/11/2018     Lab Results   Component Value Date    HGB 12.2 10/11/2018     Lab Results   Component Value Date    HCT 38.5 10/11/2018     Lab Results   Component Value Date    MCV 99 10/11/2018     Lab Results   Component Value Date    MCH 31.4 10/11/2018     Lab Results   Component Value Date    MCHC 31.7 10/11/2018     Lab Results   Component Value Date    RDW 14.9 10/11/2018     Lab Results   Component Value Date     10/11/2018     Assessment/Plan:  Severe Pharyngeal Dysphagia S/P PEG Tube Placement 9/14/18. Secondary to TBI.  NPO. Continues to work with Speech Therapy. Will order video swallow study per ST request.    Hypertension. Most blood pressures < 140/90. Continue Atenolol as ordered.    Leukocytosis. No obvious signs of infection. VSS. Appears to be chronic. Will need outpatient follow-up. Continue to monitor periodically.     Electronically signed by  SATINDER Parra CNP          Sincerely,        SATINDER Parra CNP

## 2018-10-23 ENCOUNTER — TRANSFERRED RECORDS (OUTPATIENT)
Dept: HEALTH INFORMATION MANAGEMENT | Facility: CLINIC | Age: 68
End: 2018-10-23

## 2018-10-25 ENCOUNTER — NURSING HOME VISIT (OUTPATIENT)
Dept: GERIATRICS | Facility: CLINIC | Age: 68
End: 2018-10-25
Payer: COMMERCIAL

## 2018-10-25 VITALS
TEMPERATURE: 97.4 F | BODY MASS INDEX: 31.54 KG/M2 | HEART RATE: 71 BPM | DIASTOLIC BLOOD PRESSURE: 81 MMHG | WEIGHT: 189.3 LBS | HEIGHT: 65 IN | SYSTOLIC BLOOD PRESSURE: 127 MMHG | OXYGEN SATURATION: 93 % | RESPIRATION RATE: 18 BRPM

## 2018-10-25 DIAGNOSIS — I10 BENIGN ESSENTIAL HYPERTENSION: ICD-10-CM

## 2018-10-25 DIAGNOSIS — R05.9 COUGH: Primary | ICD-10-CM

## 2018-10-25 DIAGNOSIS — R13.19 DYSPHAGIA CAUSING PULMONARY ASPIRATION WITH SWALLOWING: ICD-10-CM

## 2018-10-25 PROCEDURE — 99309 SBSQ NF CARE MODERATE MDM 30: CPT | Performed by: NURSE PRACTITIONER

## 2018-10-25 NOTE — PROGRESS NOTES
"[place] [facility]    Chief Complaint   Patient presents with     RECHECK     [place] Medical Record Number:  [id number]  Place of Service where encounter took place:  [facility] ([facility]) [[id number]]    HPI:    Gaudencio Avila is a 68 year old  (1950), who is being seen today for an episodic care visit.  HPI information obtained from: facility chart records, facility staff, patient report and [facility] chart review. Today's concern is:    Cough. Received report from staff on 10/23/18 of increased cough with yellow sputum. As patient is at high risk for aspiration related to TBI, ordered chest x-ray which revealed \"bilateral thin streaky opacities are seen consistent with bronchitis\". Increased DuoNeb from TID to QID. Today, patient reports his cough has had no changes. Denies fever, chills or shortness of breath. Per review of documentation, vital signs stable.      Severe Pharyngeal Dysphagia S/P PEG Tube Placement 9/14/18. Speech Therapy requested repeat video swallow study. Orders placed into [facility]. Per staff report, patient would like video swallow study performed at [facility]. Continues to be NPO.     Hypertension. Upon review of blood pressure over the past 5 days, systolic range from 108-168. Diastolic range from 74-98. Most readings < 140/90.     ALLERGIES: Interferons  Past Medical, Surgical, Family and Social History reviewed and updated in [facility].    Current Outpatient Prescriptions   Medication Sig Dispense Refill     ACETAMINOPHEN  mg by Gastric Tube route every 6 hours as needed for pain       ATENOLOL PO 50 mg by Gastric Tube route       atorvastatin (LIPITOR) 10 MG tablet 10 mg by Gastric Tube route daily       Dextromethorphan-Guaifenesin (MUCINEX DM)  MG per 12 hr tablet 1 tablet by Gastric Tube route every 12 hours       docusate (COLACE) 50 MG/5ML liquid 100 mg by Gastronomy tube route 2 times daily        enoxaparin (LOVENOX) 40 MG/0.4ML " "injection Inject 40 mg Subcutaneous 2 times daily       fluticasone-salmeterol (ADVAIR) 500-50 MCG/DOSE diskus inhaler Inhale 1 puff into the lungs 2 times daily       ipratropium - albuterol 0.5 mg/2.5 mg/3 mL (DUONEB) 0.5-2.5 (3) MG/3ML neb solution Take 1 vial by nebulization 4 times daily And q4h PRN       LEVOTHYROXINE SODIUM PO 25 mcg by Gastric Tube route daily       Senna 176 MG/5ML SYRP 176 mg by Gastric Tube route 2 times daily as needed       valproic acid (DEPAKENE) 250 MG/5ML SOLN syrup 500 mg by Gastric Tube route 2 times daily        Medications reviewed:  Medications reconciled to facility chart and changes were made to reflect current medications as identified as above med list. Below are the changes that were made:   Medications stopped since last EPIC medication reconciliation:   There are no discontinued medications.    Medications started since last [facility] medication reconciliation:  No orders of the defined types were placed in this encounter.    REVIEW OF SYSTEMS:  4 point ROS including Respiratory, CV, GI and , other than that noted in the HPI,  is negative    Physical Exam:  /81  Pulse 71  Temp 97.4  F (36.3  C)  Resp 18  Ht 5' 5\" (1.651 m)  Wt 189 lb 4.8 oz (85.9 kg)  SpO2 93%  BMI 31.5 kg/m2  GENERAL APPEARANCE:  Alert, in no distress  ENT:  Mouth and posterior oropharynx normal, moist mucous membranes  EYES:  EOM, conjunctivae, lids, pupils and irises normal  RESP:  respiratory effort and palpation of chest normal, no respiratory distress, lungs clear to auscultation  CV:  Palpation and auscultation of heart done , regular rate and rhythm, no murmur, rub, or gallop  ABDOMEN:  normal bowel sounds, soft, nontender, no hepatosplenomegaly or other masses. PEG tube  M/S:   Active movement of bilateral upper and lower extremities. Boots on BLE  SKIN:  Inspection of skin and subcutaneous tissue baseline, Palpation of skin and subcutaneous tissue baseline  NEURO:   Cranial nerves " 2-12 are normal tested and grossly at patient's baseline  PSYCH:  oriented to person    Recent Labs:   Last Comprehensive Metabolic Panel:  Sodium   Date Value Ref Range Status   10/09/2018 138 136 - 145 mmol/L Final     Potassium   Date Value Ref Range Status   10/09/2018 5.0 3.5 - 5.0 mmol/L Final     Chloride   Date Value Ref Range Status   10/09/2018 103 98 - 107 mmol/L Final     Carbon Dioxide   Date Value Ref Range Status   10/09/2018 20 (L) 22 - 31 mmol/L Final     Anion Gap   Date Value Ref Range Status   10/09/2018 15 5 - 18 mmol/L Final     Glucose   Date Value Ref Range Status   10/09/2018 81 70 - 125 mg/dL Final     Urea Nitrogen   Date Value Ref Range Status   10/09/2018 15 8 - 22 mg/dL Final     Creatinine   Date Value Ref Range Status   10/09/2018 0.53 (L) 0.70 - 1.30 mg/dL Final     GFR Estimate   Date Value Ref Range Status   10/09/2018 >60 >60 ml/min/1.73m2 Final     Calcium   Date Value Ref Range Status   10/09/2018 8.9 8.5 - 10.5 mg/dL Final     Lab Results   Component Value Date    WBC 14.0 10/11/2018     Lab Results   Component Value Date    RBC 3.88 10/11/2018     Lab Results   Component Value Date    HGB 12.2 10/11/2018     Lab Results   Component Value Date    HCT 38.5 10/11/2018     Lab Results   Component Value Date    MCV 99 10/11/2018     Lab Results   Component Value Date    MCH 31.4 10/11/2018     Lab Results   Component Value Date    MCHC 31.7 10/11/2018     Lab Results   Component Value Date    RDW 14.9 10/11/2018     Lab Results   Component Value Date     10/11/2018     Assessment/Plan:  Cough. With COPD. DuoNebs increased from TID to QID on 10/23/18. Chest x-ray with no acute findings on 10/23/18. Lungs clear. VSS. Due to staff report of increased cough, will order Mucinex 600 mg PO BID x 5 days. Further plans pending results.    Severe Pharyngeal Dysphagia S/P PEG Tube Placement 9/14/18. Continue Speech Therapy as ordered. Will attempt to order video swallow study through  [facility] per patient request. If unsuccessful, could contact PMR JACQUES, Arcadio Benites, to arrange VSS at [facility] versus scheduling at [place].     Hypertension. Most blood pressures < 140/90. Continue Atenolol as ordered.    Electronically signed by  SATINDER Parra CNP

## 2018-10-25 NOTE — LETTER
"    10/25/2018        RE: Gaudencio Avila  [address]  [facility] [address]        [place] [facility]    Chief Complaint   Patient presents with     RECHECK     [place] Medical Record Number:  [id number]  Place of Service where encounter took place:  [facility] ([facility]) [[id number]]    HPI:    Gaudencio Avila is a 68 year old  (1950), who is being seen today for an episodic care visit.  HPI information obtained from: facility chart records, facility staff, patient report and [facility] chart review. Today's concern is:    Cough. Received report from staff on 10/23/18 of increased cough with yellow sputum. As patient is at high risk for aspiration related to TBI, ordered chest x-ray which revealed \"bilateral thin streaky opacities are seen consistent with bronchitis\". Increased DuoNeb from TID to QID. Today, patient reports his cough has had no changes. Denies fever, chills or shortness of breath. Per review of documentation, vital signs stable.      Severe Pharyngeal Dysphagia S/P PEG Tube Placement 9/14/18. Speech Therapy requested repeat video swallow study. Orders placed into [facility]. Per staff report, patient would like video swallow study performed at [facility]. Continues to be NPO.     Hypertension. Upon review of blood pressure over the past 5 days, systolic range from 108-168. Diastolic range from 74-98. Most readings < 140/90.     ALLERGIES: Interferons  Past Medical, Surgical, Family and Social History reviewed and updated in [facility].    Current Outpatient Prescriptions   Medication Sig Dispense Refill     ACETAMINOPHEN  mg by Gastric Tube route every 6 hours as needed for pain       ATENOLOL PO 50 mg by Gastric Tube route       atorvastatin (LIPITOR) 10 MG tablet 10 mg by Gastric Tube route daily       Dextromethorphan-Guaifenesin (MUCINEX DM)  MG per 12 hr tablet 1 tablet by Gastric Tube route every 12 hours       docusate (COLACE) 50 MG/5ML " "liquid 100 mg by Gastronomy tube route 2 times daily        enoxaparin (LOVENOX) 40 MG/0.4ML injection Inject 40 mg Subcutaneous 2 times daily       fluticasone-salmeterol (ADVAIR) 500-50 MCG/DOSE diskus inhaler Inhale 1 puff into the lungs 2 times daily       ipratropium - albuterol 0.5 mg/2.5 mg/3 mL (DUONEB) 0.5-2.5 (3) MG/3ML neb solution Take 1 vial by nebulization 4 times daily And q4h PRN       LEVOTHYROXINE SODIUM PO 25 mcg by Gastric Tube route daily       Senna 176 MG/5ML SYRP 176 mg by Gastric Tube route 2 times daily as needed       valproic acid (DEPAKENE) 250 MG/5ML SOLN syrup 500 mg by Gastric Tube route 2 times daily        Medications reviewed:  Medications reconciled to facility chart and changes were made to reflect current medications as identified as above med list. Below are the changes that were made:   Medications stopped since last EPIC medication reconciliation:   There are no discontinued medications.    Medications started since last [facility] medication reconciliation:  No orders of the defined types were placed in this encounter.    REVIEW OF SYSTEMS:  4 point ROS including Respiratory, CV, GI and , other than that noted in the HPI,  is negative    Physical Exam:  /81  Pulse 71  Temp 97.4  F (36.3  C)  Resp 18  Ht 5' 5\" (1.651 m)  Wt 189 lb 4.8 oz (85.9 kg)  SpO2 93%  BMI 31.5 kg/m2  GENERAL APPEARANCE:  Alert, in no distress  ENT:  Mouth and posterior oropharynx normal, moist mucous membranes  EYES:  EOM, conjunctivae, lids, pupils and irises normal  RESP:  respiratory effort and palpation of chest normal, no respiratory distress, lungs clear to auscultation  CV:  Palpation and auscultation of heart done , regular rate and rhythm, no murmur, rub, or gallop  ABDOMEN:  normal bowel sounds, soft, nontender, no hepatosplenomegaly or other masses. PEG tube  M/S:   Active movement of bilateral upper and lower extremities. Boots on BLE  SKIN:  Inspection of skin and subcutaneous " tissue baseline, Palpation of skin and subcutaneous tissue baseline  NEURO:   Cranial nerves 2-12 are normal tested and grossly at patient's baseline  PSYCH:  oriented to person    Recent Labs:   Last Comprehensive Metabolic Panel:  Sodium   Date Value Ref Range Status   10/09/2018 138 136 - 145 mmol/L Final     Potassium   Date Value Ref Range Status   10/09/2018 5.0 3.5 - 5.0 mmol/L Final     Chloride   Date Value Ref Range Status   10/09/2018 103 98 - 107 mmol/L Final     Carbon Dioxide   Date Value Ref Range Status   10/09/2018 20 (L) 22 - 31 mmol/L Final     Anion Gap   Date Value Ref Range Status   10/09/2018 15 5 - 18 mmol/L Final     Glucose   Date Value Ref Range Status   10/09/2018 81 70 - 125 mg/dL Final     Urea Nitrogen   Date Value Ref Range Status   10/09/2018 15 8 - 22 mg/dL Final     Creatinine   Date Value Ref Range Status   10/09/2018 0.53 (L) 0.70 - 1.30 mg/dL Final     GFR Estimate   Date Value Ref Range Status   10/09/2018 >60 >60 ml/min/1.73m2 Final     Calcium   Date Value Ref Range Status   10/09/2018 8.9 8.5 - 10.5 mg/dL Final     Lab Results   Component Value Date    WBC 14.0 10/11/2018     Lab Results   Component Value Date    RBC 3.88 10/11/2018     Lab Results   Component Value Date    HGB 12.2 10/11/2018     Lab Results   Component Value Date    HCT 38.5 10/11/2018     Lab Results   Component Value Date    MCV 99 10/11/2018     Lab Results   Component Value Date    MCH 31.4 10/11/2018     Lab Results   Component Value Date    MCHC 31.7 10/11/2018     Lab Results   Component Value Date    RDW 14.9 10/11/2018     Lab Results   Component Value Date     10/11/2018     Assessment/Plan:  Cough. With COPD. DuoNebs increased from TID to QID on 10/23/18. Chest x-ray with no acute findings on 10/23/18. Lungs clear. VSS. Due to staff report of increased cough, will order Mucinex 600 mg PO BID x 5 days. Further plans pending results.    Severe Pharyngeal Dysphagia S/P PEG Tube Placement  9/14/18. Continue Speech Therapy as ordered. Will attempt to order video swallow study through [facility] per patient request. If unsuccessful, could contact PMR JACQUES, Arcadio Benites, to arrange VSS at [facility] versus scheduling at [place].     Hypertension. Most blood pressures < 140/90. Continue Atenolol as ordered.    Electronically signed by  SATINDER Parra CNP                      Sincerely,        SATINDER Parra CNP

## 2018-10-26 ENCOUNTER — TELEPHONE (OUTPATIENT)
Dept: FAMILY MEDICINE | Facility: CLINIC | Age: 68
End: 2018-10-26

## 2018-10-26 RX ORDER — GUAIFENESIN AND DEXTROMETHORPHAN HYDROBROMIDE 600; 30 MG/1; MG/1
1 TABLET, EXTENDED RELEASE ORAL EVERY 12 HOURS
COMMUNITY
Start: 2018-10-25 | End: 2019-03-14

## 2018-10-26 NOTE — TELEPHONE ENCOUNTER
Panel Management Review      Patient has the following on his problem list:     Hypertension   Last three blood pressure readings:  BP Readings from Last 3 Encounters:   10/25/18 127/81   10/15/18 132/87   10/09/18 (!) 140/92     Blood pressure: Passed    HTN Guidelines:  Age 18-59 BP range:  Less than 140/90  Age 60-85 with Diabetes:  Less than 140/90  Age 60-85 without Diabetes:  less than 150/90     COPD  Diagnosis date: 3/01/2010   Is this diagnosis new within the last year?   NO   Was spirometry completed?  YES      Composite cancer screening  Chart review shows that this patient is due/due soon for the following Colonoscopy  Summary:    Patient is due/failing the following:   COLONOSCOPY    Action needed:   Patient needs office visit for Colonoscopy.    Type of outreach:    Sent letter.    Questions for provider review:    None                                                                                                                                    Crystal Sewell CMA on 10/26/2018 at 8:25 AM       Chart routed to Provider .

## 2018-10-26 NOTE — LETTER
October 26, 2018          Guadencio Avila,  [address] [facility] [address]        Dear Gaudencio Avila      Monitoring and managing your preventative and chronic health conditions are very important to us. Our records indicate that you have not scheduled for Colposcopy  which was recommended by Dr. Renee      If you have received your health care elsewhere, please call the clinic so the information can be documented in your chart.    Please call [phone] or message us through your [employer] account to schedule an appointment or provide information for your chart.     Feel free to contact us if you have any questions or concerns!    I look forward to seeing you and working with you on your health care needs.     Sincerely,         Tanya Renee / MEGAN

## 2018-11-01 VITALS
DIASTOLIC BLOOD PRESSURE: 76 MMHG | TEMPERATURE: 97.2 F | RESPIRATION RATE: 16 BRPM | HEIGHT: 65 IN | OXYGEN SATURATION: 92 % | HEART RATE: 73 BPM | SYSTOLIC BLOOD PRESSURE: 130 MMHG | WEIGHT: 189 LBS | BODY MASS INDEX: 31.49 KG/M2

## 2018-11-02 ENCOUNTER — NURSING HOME VISIT (OUTPATIENT)
Dept: GERIATRICS | Facility: CLINIC | Age: 68
End: 2018-11-02
Payer: COMMERCIAL

## 2018-11-02 ENCOUNTER — TRANSFERRED RECORDS (OUTPATIENT)
Dept: HEALTH INFORMATION MANAGEMENT | Facility: CLINIC | Age: 68
End: 2018-11-02

## 2018-11-02 ENCOUNTER — RECORDS - HEALTHEAST (OUTPATIENT)
Dept: LAB | Facility: CLINIC | Age: 68
End: 2018-11-02

## 2018-11-02 DIAGNOSIS — R50.9 FEVER, UNSPECIFIED FEVER CAUSE: Primary | ICD-10-CM

## 2018-11-02 DIAGNOSIS — I10 BENIGN ESSENTIAL HYPERTENSION: ICD-10-CM

## 2018-11-02 DIAGNOSIS — S06.9X9D TRAUMATIC BRAIN INJURY WITH LOSS OF CONSCIOUSNESS, SUBSEQUENT ENCOUNTER: ICD-10-CM

## 2018-11-02 DIAGNOSIS — R13.10 DYSPHAGIA, UNSPECIFIED TYPE: ICD-10-CM

## 2018-11-02 LAB
BASOPHILS # BLD AUTO: 0 THOU/UL (ref 0–0.2)
BASOPHILS NFR BLD AUTO: 0 % (ref 0–2)
DIFFERENTIAL: ABNORMAL
EOSINOPHIL COUNT (ABSOLUTE): 0.4 THOU/UL (ref 0–0.4)
EOSINOPHIL NFR BLD AUTO: 2 % (ref 0–6)
ERYTHROCYTE [DISTWIDTH] IN BLOOD BY AUTOMATED COUNT: 14.6 % (ref 11–14.5)
ERYTHROCYTE [DISTWIDTH] IN BLOOD BY AUTOMATED COUNT: 14.6 % (ref 11–14.5)
HCT VFR BLD AUTO: 43.6 % (ref 40–54)
HCT VFR BLD AUTO: 43.6 % (ref 40–54)
HEMOGLOBIN: 13.9 G/DL (ref 14–18)
HGB BLD-MCNC: 13.9 G/DL (ref 14–18)
LYMPHOCYTES # BLD AUTO: 8.8 THOU/UL (ref 0.8–4.4)
LYMPHOCYTES NFR BLD AUTO: 45 % (ref 20–40)
MCH RBC QN AUTO: 30.9 PG (ref 27–34)
MCH RBC QN AUTO: 30.9 PG (ref 27–34)
MCHC RBC AUTO-ENTMCNC: 31.9 G/DL (ref 32–36)
MCHC RBC AUTO-ENTMCNC: 31.9 G/DL (ref 32–36)
MCV RBC AUTO: 97 FL (ref 80–100)
MCV RBC AUTO: 97 FL (ref 80–100)
MONOCYTES # BLD AUTO: 0.9 THOU/UL (ref 0–0.9)
MONOCYTES NFR BLD AUTO: 5 % (ref 2–10)
PLAT MORPH BLD: NORMAL
PLATELET # BLD AUTO: 333 THOU/UL (ref 140–440)
PLATELET # BLD AUTO: 333 THOU/UL (ref 140–440)
PMV BLD AUTO: 11.9 FL (ref 8.5–12.5)
RBC # BLD AUTO: 4.5 MILL/UL (ref 4.4–6.2)
RBC # BLD AUTO: 4.5 MILL/UL (ref 4.4–6.2)
REACTIVE LYMPHS: ABNORMAL
TOTAL NEUTROPHILS-ABS(DIFF): 9.7 THOU/UL (ref 2–7.7)
TOTAL NEUTROPHILS-REL(DIFF): 49 % (ref 50–70)
WBC # BLD AUTO: 19.7 THOU/UL (ref 4–11)
WBC: 19.7 THOU/UL (ref 4–11)

## 2018-11-02 PROCEDURE — 99309 SBSQ NF CARE MODERATE MDM 30: CPT | Performed by: INTERNAL MEDICINE
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
## 2018-11-05 ENCOUNTER — TRANSFERRED RECORDS (OUTPATIENT)
Dept: HEALTH INFORMATION MANAGEMENT | Facility: CLINIC | Age: 68
End: 2018-11-05

## 2018-11-05 ENCOUNTER — RECORDS - HEALTHEAST (OUTPATIENT)
Dept: LAB | Facility: CLINIC | Age: 68
End: 2018-11-05

## 2018-11-05 LAB
WBC # BLD AUTO: 14.4 THOU/UL (ref 4–11)
WBC: 14.4 THOU/UL (ref 4–11)

## 2018-11-08 ENCOUNTER — TELEPHONE (OUTPATIENT)
Dept: FAMILY MEDICINE | Facility: CLINIC | Age: 68
End: 2018-11-08
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
## 2018-11-12 ENCOUNTER — TRANSFERRED RECORDS (OUTPATIENT)
Dept: HEALTH INFORMATION MANAGEMENT | Facility: CLINIC | Age: 68
End: 2018-11-12
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
## 2018-11-19 ENCOUNTER — NURSING HOME VISIT (OUTPATIENT)
Dept: GERIATRICS | Facility: CLINIC | Age: 68
End: 2018-11-19
Payer: COMMERCIAL

## 2018-11-19 VITALS
BODY MASS INDEX: 31.32 KG/M2 | DIASTOLIC BLOOD PRESSURE: 72 MMHG | SYSTOLIC BLOOD PRESSURE: 136 MMHG | HEIGHT: 65 IN | OXYGEN SATURATION: 93 % | HEART RATE: 72 BPM | TEMPERATURE: 96.9 F | WEIGHT: 188 LBS | RESPIRATION RATE: 20 BRPM

## 2018-11-19 DIAGNOSIS — R49.9 VOICE QUALITY DISORDER: Primary | ICD-10-CM

## 2018-11-19 DIAGNOSIS — R13.10 DYSPHAGIA, UNSPECIFIED TYPE: ICD-10-CM

## 2018-11-19 DIAGNOSIS — I10 BENIGN ESSENTIAL HYPERTENSION: ICD-10-CM

## 2018-11-19 PROCEDURE — 99309 SBSQ NF CARE MODERATE MDM 30: CPT | Performed by: NURSE PRACTITIONER

## 2018-11-19 NOTE — LETTER
11/19/2018        RE: Gaudencio Avila  [address]  [facility] [address]        [place] [facility]    Chief Complaint   Patient presents with     RECHECK       [place] [facility] Record Number:  [id number]  Place of Service where encounter took place:  [facility] ([facility] [[id number]]    HPI:    Gaudencio Avila is a 68 year old  (1950), who is being seen today for an episodic care visit.  HPI information obtained from: facility chart records, facility staff, patient report and [facility] chart review. Today's concern is:    Voice Quality Disorder. Per staff report, Speech Therapy is noticing patient is unable to increase the volume of his voice. Had a raspy voice prior to most recent TBI, but patient reports it has gotten worse since. Recommending an ENT referral. Spoke to niece and mother on 11/15/18 and they are in agreement with ENT referral. Today, patient states he can't speak loudly anymore and doesn't understand why. Would be interesting in discussing with ENT.     Severe Pharyngeal Dysphagia S/P PEG Tube Placement 9/14/18.  Per staff report, patient noted to have continued severe pharyngeal dysphagia and is at high risk for aspiration on most recent modified barium swallow study on 11/12/18. No further swallow study is recommended unless spontaneous recovery were to occur. Today, Speech Therapy reports they will be discharging patient today.       Hypertension. Upon review of blood pressure over the past 5 days, systolic range from 104-146 with an episode of 176. Diastolic range from 69-85.    ALLERGIES: Interferons  Past Medical, Surgical, Family and Social History reviewed and [facility].    Current Outpatient Prescriptions   Medication Sig Dispense Refill     ACETAMINOPHEN  mg by Gastric Tube route every 6 hours as needed for pain       ATENOLOL PO 50 mg by Gastric Tube route       atorvastatin (LIPITOR) 10 MG tablet 10 mg by Gastric Tube route  "daily       docusate (COLACE) 50 MG/5ML liquid 100 mg by Gastronomy tube route 2 times daily        fluticasone-salmeterol (ADVAIR) 500-50 MCG/DOSE diskus inhaler Inhale 1 puff into the lungs 2 times daily       ipratropium - albuterol 0.5 mg/2.5 mg/3 mL (DUONEB) 0.5-2.5 (3) MG/3ML neb solution Take 1 vial by nebulization 4 times daily And q4h PRN       LEVOTHYROXINE SODIUM PO 25 mcg by Gastric Tube route daily       Senna 176 MG/5ML SYRP 176 mg by Gastric Tube route 2 times daily as needed       valproic acid (DEPAKENE) 250 MG/5ML SOLN syrup 500 mg by Gastric Tube route 2 times daily        Medications reviewed:  Medications reconciled to facility chart and changes were made to reflect current medications as identified as above med list. Below are the changes that were made:   Medications stopped since last EPIC medication reconciliation:   Medications Discontinued During This Encounter   Medication Reason     enoxaparin (LOVENOX) 40 MG/0.4ML injection Medication Reconciliation Clean Up     guaiFENesin (ROBITUSSIN) 100 MG/5ML SYRP Medication Reconciliation Clean Up       Medications started since last [facility] medication reconciliation:  No orders of the defined types were placed in this encounter.    REVIEW OF SYSTEMS:  4 point ROS including Respiratory, CV, GI and , other than that noted in the HPI,  is negative    Physical Exam:  /72  Pulse 72  Temp 96.9  F (36.1  C)  Resp 20  Ht 5' 5\" (1.651 m)  Wt 188 lb (85.3 kg)  SpO2 93%  BMI 31.28 kg/m2  GENERAL APPEARANCE:  Alert, in no distress  ENT:  Mouth and posterior oropharynx normal, moist mucous membranes  EYES:  EOM, conjunctivae, lids, pupils and irises normal  RESP:  respiratory effort and palpation of chest normal, no respiratory distress, lungs clear to auscultation  CV:  Palpation and auscultation of heart done , regular rate and rhythm, no murmur, rub, or gallop  ABDOMEN:  normal bowel sounds, soft, nontender, no hepatosplenomegaly or other " masses. PEG tube  M/S:   Active movement of bilateral upper and lower extremities. Boots on BLE  SKIN:  Inspection of skin and subcutaneous tissue baseline, Palpation of skin and subcutaneous tissue baseline  NEURO:   Cranial nerves 2-12 are normal tested and grossly at patient's baseline  PSYCH:  oriented to person    Recent Labs:   Last Comprehensive Metabolic Panel:  Sodium   Date Value Ref Range Status   10/09/2018 138 136 - 145 mmol/L Final     Potassium   Date Value Ref Range Status   10/09/2018 5.0 3.5 - 5.0 mmol/L Final     Chloride   Date Value Ref Range Status   10/09/2018 103 98 - 107 mmol/L Final     Carbon Dioxide   Date Value Ref Range Status   10/09/2018 20 (L) 22 - 31 mmol/L Final     Anion Gap   Date Value Ref Range Status   10/09/2018 15 5 - 18 mmol/L Final     Glucose   Date Value Ref Range Status   10/09/2018 81 70 - 125 mg/dL Final     Urea Nitrogen   Date Value Ref Range Status   10/09/2018 15 8 - 22 mg/dL Final     Creatinine   Date Value Ref Range Status   10/09/2018 0.53 (L) 0.70 - 1.30 mg/dL Final     GFR Estimate   Date Value Ref Range Status   10/09/2018 >60 >60 ml/min/1.73m2 Final     Calcium   Date Value Ref Range Status   10/09/2018 8.9 8.5 - 10.5 mg/dL Final     Lab Results   Component Value Date    WBC 14.4 11/05/2018     Lab Results   Component Value Date    RBC 4.50 11/02/2018     Lab Results   Component Value Date    HGB 13.9 11/02/2018     Lab Results   Component Value Date    HCT 43.6 11/02/2018     Lab Results   Component Value Date    MCV 97 11/02/2018     Lab Results   Component Value Date    MCH 30.9 11/02/2018     Lab Results   Component Value Date    MCHC 31.9 11/02/2018     Lab Results   Component Value Date    RDW 14.6 11/02/2018     Lab Results   Component Value Date     11/02/2018     Assessment/Plan:  Voice Quality Disorder. Present prior to most recent TBI in August 2018, but worse per patient report. Has worked with Speech Therapy. Will write order to refer  to ENT for further work-up.     Severe Pharyngeal Dysphagia S/P PEG Tube Placement 9/14/18. Last day of Speech Therapy today. Last Modified Barium Swallow Study on 11/12/18 continued to reveal severe pharyngeal dysphagia. Repeat video swallow study only if spontaneous recovery. Continues on Isosource 1.5 bolus of 2 cans BID and 1 can at HS via PEG tube to provide 1875 calories per day.      Hypertension. Most blood pressures <140/90. Continue Atenolol as ordered.     Electronically signed by  SATINDER Parra CNP           Sincerely,        SATINDER Parra CNP

## 2018-11-26 DIAGNOSIS — I10 HYPERTENSION GOAL BP (BLOOD PRESSURE) < 150/90: Primary | ICD-10-CM

## 2018-11-26 RX ORDER — ATENOLOL 25 MG/1
50 TABLET ORAL DAILY
Qty: 60 TABLET | Refills: 0 | Status: SHIPPED | OUTPATIENT
Start: 2018-11-26 | End: 2019-01-16 | Stop reason: DRUGHIGH

## 2018-11-26 NOTE — TELEPHONE ENCOUNTER
Signed Prescriptions:                        Disp   Refills    atenolol (TENORMIN) 25 MG tablet           60 tab*0        Sig: Take 2 tablets (50 mg) by mouth daily . No further           refills until follow-up appointment  Authorizing Provider: BIANCA LANE

## 2018-11-26 NOTE — TELEPHONE ENCOUNTER
"Requested Prescriptions   Pending Prescriptions Disp Refills     atenolol (TENORMIN) 25 MG tablet 30 tablet      Si tablets (50 mg)    Beta-Blockers Protocol Passed    2018 10:06 AM       Passed - Blood pressure under 140/90 in past 12 months    BP Readings from Last 3 Encounters:   18 136/72   18 130/76   10/25/18 127/81                Passed - Patient is age 6 or older       Passed - Recent (12 mo) or future (30 days) visit within the authorizing provider's specialty    Patient had office visit in the last 12 months or has a visit in the next 30 days with authorizing provider or within the authorizing provider's specialty.  See \"Patient Info\" tab in inbasket, or \"Choose Columns\" in Meds & Orders section of the refill encounter.              Routing refill request to provider for review/approval because:  Medication is reported/historical    Yolanda Lomeli RN          "

## 2018-12-07 ENCOUNTER — NURSING HOME VISIT (OUTPATIENT)
Dept: GERIATRICS | Facility: CLINIC | Age: 68
End: 2018-12-07
Payer: COMMERCIAL

## 2018-12-07 VITALS
HEART RATE: 80 BPM | OXYGEN SATURATION: 93 % | SYSTOLIC BLOOD PRESSURE: 121 MMHG | WEIGHT: 187.8 LBS | RESPIRATION RATE: 20 BRPM | HEIGHT: 65 IN | TEMPERATURE: 97.8 F | DIASTOLIC BLOOD PRESSURE: 81 MMHG | BODY MASS INDEX: 31.29 KG/M2

## 2018-12-07 DIAGNOSIS — R13.19 DYSPHAGIA CAUSING PULMONARY ASPIRATION WITH SWALLOWING: ICD-10-CM

## 2018-12-07 DIAGNOSIS — G40.909 SEIZURE DISORDER (H): Primary | ICD-10-CM

## 2018-12-07 DIAGNOSIS — E03.9 ACQUIRED HYPOTHYROIDISM: ICD-10-CM

## 2018-12-07 DIAGNOSIS — S06.9X9S TRAUMATIC BRAIN INJURY WITH LOSS OF CONSCIOUSNESS, SEQUELA (H): ICD-10-CM

## 2018-12-07 DIAGNOSIS — D72.829 LEUKOCYTOSIS, UNSPECIFIED TYPE: ICD-10-CM

## 2018-12-07 DIAGNOSIS — I10 HYPERTENSION GOAL BP (BLOOD PRESSURE) < 150/90: ICD-10-CM

## 2018-12-07 DIAGNOSIS — J43.9 PULMONARY EMPHYSEMA, UNSPECIFIED EMPHYSEMA TYPE (H): ICD-10-CM

## 2018-12-07 PROCEDURE — 99309 SBSQ NF CARE MODERATE MDM 30: CPT | Performed by: NURSE PRACTITIONER
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
## 2018-12-12 ENCOUNTER — RECORDS - HEALTHEAST (OUTPATIENT)
Dept: LAB | Facility: CLINIC | Age: 68
End: 2018-12-12

## 2018-12-12 ENCOUNTER — TRANSFERRED RECORDS (OUTPATIENT)
Dept: HEALTH INFORMATION MANAGEMENT | Facility: CLINIC | Age: 68
End: 2018-12-12

## 2018-12-12 LAB
ERYTHROCYTE [DISTWIDTH] IN BLOOD BY AUTOMATED COUNT: 14.7 % (ref 11–14.5)
ERYTHROCYTE [DISTWIDTH] IN BLOOD BY AUTOMATED COUNT: 14.7 % (ref 11–14.5)
HCT VFR BLD AUTO: 44.3 % (ref 40–54)
HCT VFR BLD AUTO: 44.3 % (ref 40–54)
HEMOGLOBIN: 14 G/DL (ref 14–18)
HGB BLD-MCNC: 14 G/DL (ref 14–18)
MCH RBC QN AUTO: 30.6 PG (ref 27–34)
MCH RBC QN AUTO: 30.6 PG (ref 27–34)
MCHC RBC AUTO-ENTMCNC: 31.6 G/DL (ref 32–36)
MCHC RBC AUTO-ENTMCNC: 31.6 G/DL (ref 32–36)
MCV RBC AUTO: 97 FL (ref 80–100)
MCV RBC AUTO: 97 FL (ref 80–100)
PLATELET # BLD AUTO: 261 THOU/UL (ref 140–440)
PLATELET # BLD AUTO: 261 THOU/UL (ref 140–440)
PMV BLD AUTO: 11.9 FL (ref 8.5–12.5)
RBC # BLD AUTO: 4.57 MILL/UL (ref 4.4–6.2)
RBC # BLD AUTO: 4.57 MILL/UL (ref 4.4–6.2)
VALPROATE SERPL-MCNC: 33 UG/ML (ref 50–150)
WBC # BLD AUTO: 17.6 THOU/UL (ref 4–11)
WBC: 17.6 THOU/UL (ref 4–11)

## 2018-12-13 ENCOUNTER — RECORDS - HEALTHEAST (OUTPATIENT)
Dept: LAB | Facility: CLINIC | Age: 68
End: 2018-12-13

## 2018-12-13 ENCOUNTER — TRANSFERRED RECORDS (OUTPATIENT)
Dept: HEALTH INFORMATION MANAGEMENT | Facility: CLINIC | Age: 68
End: 2018-12-13

## 2018-12-13 LAB
ALBUMIN SERPL-MCNC: 3.3 G/DL (ref 3.5–5)
ALBUMIN SERPL-MCNC: 3.3 G/DL (ref 3.5–5)
ALP SERPL-CCNC: 59 U/L (ref 45–120)
ALP SERPL-CCNC: 59 U/L (ref 45–120)
ALT SERPL W P-5'-P-CCNC: 17 U/L (ref 0–45)
ALT SERPL-CCNC: 17 U/L (ref 0–45)
ANION GAP SERPL CALCULATED.3IONS-SCNC: 11 MMOL/L (ref 5–18)
ANION GAP SERPL CALCULATED.3IONS-SCNC: 11 MMOL/L (ref 5–18)
AST SERPL W P-5'-P-CCNC: 16 U/L (ref 0–40)
AST SERPL-CCNC: 16 U/L (ref 0–40)
BILIRUB DIRECT SERPL-MCNC: 0.2 MG/DL
BILIRUB SERPL-MCNC: 0.4 MG/DL (ref 0–1)
BILIRUB SERPL-MCNC: 0.4 MG/DL (ref 0–1)
BILIRUBIN DIRECT: 0.2 MG/DL
BUN SERPL-MCNC: 18 MG/DL (ref 8–22)
BUN SERPL-MCNC: 18 MG/DL (ref 8–22)
CALCIUM SERPL-MCNC: 9.6 MG/DL (ref 8.5–10.5)
CALCIUM SERPL-MCNC: 9.6 MG/DL (ref 8.5–10.5)
CHLORIDE BLD-SCNC: 104 MMOL/L (ref 98–107)
CHLORIDE SERPLBLD-SCNC: 104 MMOL/L (ref 98–107)
CO2 SERPL-SCNC: 25 MMOL/L (ref 22–31)
CO2 SERPL-SCNC: 25 MMOL/L (ref 22–31)
CREAT SERPL-MCNC: 0.57 MG/DL (ref 0.7–1.3)
CREAT SERPL-MCNC: 0.57 MG/DL (ref 0.7–1.3)
GFR SERPL CREATININE-BSD FRML MDRD: >60 ML/MIN/1.73M2
GFR SERPL CREATININE-BSD FRML MDRD: >60 ML/MIN/1.73M2
GLUCOSE BLD-MCNC: 93 MG/DL (ref 70–125)
GLUCOSE SERPL-MCNC: 93 MG/DL (ref 70–125)
POTASSIUM BLD-SCNC: 4.5 MMOL/L (ref 3.5–5)
POTASSIUM SERPL-SCNC: 4.5 MMOL/L (ref 3.5–5)
PROT SERPL-MCNC: 7.7 G/DL (ref 6–8)
PROT SERPL-MCNC: 7.7 G/DL (ref 6–8)
SODIUM SERPL-SCNC: 140 MMOL/L (ref 136–145)
SODIUM SERPL-SCNC: 140 MMOL/L (ref 136–145)
TSH SERPL DL<=0.005 MIU/L-ACNC: 1.36 UIU/ML (ref 0.3–5)
TSH SERPL-ACNC: 1.36 UIU/ML (ref 0.3–5)

## 2018-12-16 ENCOUNTER — TRANSFERRED RECORDS (OUTPATIENT)
Dept: HEALTH INFORMATION MANAGEMENT | Facility: CLINIC | Age: 68
End: 2018-12-16

## 2018-12-17 ENCOUNTER — RECORDS - HEALTHEAST (OUTPATIENT)
Dept: LAB | Facility: CLINIC | Age: 68
End: 2018-12-17

## 2018-12-17 ENCOUNTER — TRANSFERRED RECORDS (OUTPATIENT)
Dept: HEALTH INFORMATION MANAGEMENT | Facility: CLINIC | Age: 68
End: 2018-12-17

## 2018-12-17 LAB
ALBUMIN UR-MCNC: ABNORMAL MG/DL
APPEARANCE UR: CLEAR
BACTERIA #/AREA URNS HPF: ABNORMAL HPF
BASOPHILS # BLD AUTO: 0.1 THOU/UL (ref 0–0.2)
BASOPHILS NFR BLD AUTO: 0 % (ref 0–2)
BILIRUB UR QL STRIP: NEGATIVE
COLOR UR AUTO: YELLOW
DIFFERENTIAL: ABNORMAL
EOSINOPHIL # BLD AUTO: 0 THOU/UL (ref 0–0.4)
EOSINOPHIL NFR BLD AUTO: 0 % (ref 0–6)
ERYTHROCYTE [DISTWIDTH] IN BLOOD BY AUTOMATED COUNT: 14.7 % (ref 11–14.5)
ERYTHROCYTE [DISTWIDTH] IN BLOOD BY AUTOMATED COUNT: 14.7 % (ref 11–14.5)
GLUCOSE UR STRIP-MCNC: NEGATIVE MG/DL
HCT VFR BLD AUTO: 43.2 % (ref 40–54)
HCT VFR BLD AUTO: 43.2 % (ref 40–54)
HEMOGLOBIN: 13.6 G/DL (ref 14–18)
HGB BLD-MCNC: 13.6 G/DL (ref 14–18)
HGB UR QL STRIP: ABNORMAL
HYALINE CASTS #/AREA URNS LPF: ABNORMAL LPF
KETONES UR STRIP-MCNC: ABNORMAL MG/DL
LEUKOCYTE ESTERASE UR QL STRIP: NEGATIVE
LYMPHOCYTES # BLD AUTO: 6.3 THOU/UL (ref 0.8–4.4)
LYMPHOCYTES NFR BLD AUTO: 31 % (ref 20–40)
MCH RBC QN AUTO: 30.2 PG (ref 27–34)
MCH RBC QN AUTO: 30.2 PG (ref 27–34)
MCHC RBC AUTO-ENTMCNC: 31.5 G/DL (ref 32–36)
MCHC RBC AUTO-ENTMCNC: 31.5 G/DL (ref 32–36)
MCV RBC AUTO: 96 FL (ref 80–100)
MCV RBC AUTO: 96 FL (ref 80–100)
MONOCYTES # BLD AUTO: 2.3 THOU/UL (ref 0–0.9)
MONOCYTES NFR BLD AUTO: 11 % (ref 2–10)
MUCOUS THREADS #/AREA URNS LPF: ABNORMAL LPF
NEUTROPHILS # BLD AUTO: 11.8 THOU/UL (ref 2–7.7)
NEUTROPHILS NFR BLD AUTO: 58 % (ref 50–70)
NITRATE UR QL: NEGATIVE
PH UR STRIP: 6.5 [PH] (ref 4.5–8)
PLATELET # BLD AUTO: 287 THOU/UL (ref 140–440)
PLATELET # BLD AUTO: 287 THOU/UL (ref 140–440)
PMV BLD AUTO: 11.1 FL (ref 8.5–12.5)
RBC # BLD AUTO: 4.5 MILL/UL (ref 4.4–6.2)
RBC # BLD AUTO: 4.5 MILL/UL (ref 4.4–6.2)
RBC #/AREA URNS AUTO: ABNORMAL HPF
SP GR UR STRIP: 1.02 (ref 1–1.03)
SQUAMOUS #/AREA URNS AUTO: ABNORMAL LPF
TRANS CELLS #/AREA URNS HPF: ABNORMAL LPF
UROBILINOGEN UR STRIP-ACNC: ABNORMAL
WBC # BLD AUTO: 20.5 THOU/UL (ref 4–11)
WBC #/AREA URNS AUTO: ABNORMAL HPF
WBC: 20.5 THOU/UL (ref 4–11)

## 2018-12-18 LAB — BACTERIA SPEC CULT: NO GROWTH

## 2019-01-16 VITALS
OXYGEN SATURATION: 95 % | BODY MASS INDEX: 29.37 KG/M2 | TEMPERATURE: 96.3 F | WEIGHT: 176.3 LBS | HEART RATE: 72 BPM | HEIGHT: 65 IN | DIASTOLIC BLOOD PRESSURE: 89 MMHG | RESPIRATION RATE: 20 BRPM | SYSTOLIC BLOOD PRESSURE: 138 MMHG

## 2019-01-16 RX ORDER — ATENOLOL 50 MG/1
50 TABLET ORAL DAILY
COMMUNITY
End: 2020-02-04

## 2019-01-16 ASSESSMENT — MIFFLIN-ST. JEOR: SCORE: 1491.57

## 2019-01-18 ENCOUNTER — NURSING HOME VISIT (OUTPATIENT)
Dept: GERIATRICS | Facility: CLINIC | Age: 69
End: 2019-01-18
Payer: COMMERCIAL

## 2019-01-18 DIAGNOSIS — G40.909 SEIZURE DISORDER (H): ICD-10-CM

## 2019-01-18 DIAGNOSIS — J44.9 CHRONIC OBSTRUCTIVE PULMONARY DISEASE, UNSPECIFIED COPD TYPE (H): ICD-10-CM

## 2019-01-18 DIAGNOSIS — I10 ESSENTIAL HYPERTENSION: Primary | ICD-10-CM

## 2019-01-18 DIAGNOSIS — S06.9X9S TRAUMATIC BRAIN INJURY WITH LOSS OF CONSCIOUSNESS, SEQUELA (H): ICD-10-CM

## 2019-01-18 PROCEDURE — 99309 SBSQ NF CARE MODERATE MDM 30: CPT | Performed by: INTERNAL MEDICINE
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
## 2019-01-21 ENCOUNTER — TRANSFERRED RECORDS (OUTPATIENT)
Dept: HEALTH INFORMATION MANAGEMENT | Facility: CLINIC | Age: 69
End: 2019-01-21

## 2019-01-21 ENCOUNTER — RECORDS - HEALTHEAST (OUTPATIENT)
Dept: LAB | Facility: CLINIC | Age: 69
End: 2019-01-21

## 2019-01-21 LAB
BASOPHILS # BLD AUTO: 0 THOU/UL (ref 0–0.2)
BASOPHILS NFR BLD AUTO: 0 % (ref 0–2)
C REACTIVE PROTEIN LHE: 7.1 MG/DL (ref 0–0.8)
DIFFERENTIAL: ABNORMAL
EOSINOPHIL # BLD AUTO: 0.1 THOU/UL (ref 0–0.4)
EOSINOPHIL NFR BLD AUTO: 0 % (ref 0–6)
ERYTHROCYTE [DISTWIDTH] IN BLOOD BY AUTOMATED COUNT: 15.4 % (ref 11–14.5)
ERYTHROCYTE [DISTWIDTH] IN BLOOD BY AUTOMATED COUNT: 15.4 % (ref 11–14.5)
ERYTHROCYTE [SEDIMENTATION RATE] IN BLOOD BY WESTERGREN METHOD: 72 MM/HR (ref 0–15)
HCT VFR BLD AUTO: 44.5 % (ref 40–54)
HCT VFR BLD AUTO: 44.5 % (ref 40–54)
HEMOGLOBIN: 14.4 G/DL (ref 14–18)
HGB BLD-MCNC: 14.4 G/DL (ref 14–18)
LYMPHOCYTES # BLD AUTO: 4.2 THOU/UL (ref 0.8–4.4)
LYMPHOCYTES NFR BLD AUTO: 27 % (ref 20–40)
MCH RBC QN AUTO: 30.1 PG (ref 27–34)
MCH RBC QN AUTO: 30.1 PG (ref 27–34)
MCHC RBC AUTO-ENTMCNC: 32.4 G/DL (ref 32–36)
MCHC RBC AUTO-ENTMCNC: 32.4 G/DL (ref 32–36)
MCV RBC AUTO: 93 FL (ref 80–100)
MCV RBC AUTO: 93 FL (ref 80–100)
MONOCYTES # BLD AUTO: 1.8 THOU/UL (ref 0–0.9)
MONOCYTES NFR BLD AUTO: 12 % (ref 2–10)
NEUTROPHILS # BLD AUTO: 9.4 THOU/UL (ref 2–7.7)
NEUTROPHILS NFR BLD AUTO: 61 % (ref 50–70)
PLATELET # BLD AUTO: 246 THOU/UL (ref 140–440)
PLATELET # BLD AUTO: 246 THOU/UL (ref 140–440)
PMV BLD AUTO: 12.4 FL (ref 8.5–12.5)
RBC # BLD AUTO: 4.79 MILL/UL (ref 4.4–6.2)
RBC # BLD AUTO: 4.79 MILL/UL (ref 4.4–6.2)
WBC # BLD AUTO: 15.5 THOU/UL (ref 4–11)
WBC: 15.5 THOU/UL (ref 4–11)

## 2019-01-25 ENCOUNTER — NURSING HOME VISIT (OUTPATIENT)
Dept: GERIATRICS | Facility: CLINIC | Age: 69
End: 2019-01-25
Payer: COMMERCIAL

## 2019-01-25 VITALS
WEIGHT: 177.7 LBS | OXYGEN SATURATION: 93 % | TEMPERATURE: 97.5 F | RESPIRATION RATE: 20 BRPM | HEIGHT: 65 IN | BODY MASS INDEX: 29.61 KG/M2 | HEART RATE: 72 BPM | DIASTOLIC BLOOD PRESSURE: 83 MMHG | SYSTOLIC BLOOD PRESSURE: 126 MMHG

## 2019-01-25 DIAGNOSIS — L08.9 SKIN INFECTION: ICD-10-CM

## 2019-01-25 DIAGNOSIS — L08.9 PUSTULE OF NOSTRIL: Primary | ICD-10-CM

## 2019-01-25 DIAGNOSIS — L73.9 FOLLICULITIS: ICD-10-CM

## 2019-01-25 PROCEDURE — 99309 SBSQ NF CARE MODERATE MDM 30: CPT | Performed by: NURSE PRACTITIONER

## 2019-01-25 RX ORDER — CEPHALEXIN 500 MG/1
500 CAPSULE ORAL 3 TIMES DAILY
COMMUNITY
Start: 2019-01-21 | End: 2019-03-14

## 2019-01-25 RX ORDER — MUPIROCIN 20 MG/G
OINTMENT TOPICAL 4 TIMES DAILY
COMMUNITY
End: 2019-03-14

## 2019-01-25 ASSESSMENT — MIFFLIN-ST. JEOR: SCORE: 1497.92

## 2019-01-25 NOTE — PROGRESS NOTES
[place] [facility]    Chief Complaint   Patient presents with     WILL       [place] Medical Record Number:  [id number]  Place of Service where encounter took place:  [facility] ([facility]) [[id number]]    HPI:    Gaudencio Avila is a 68 year old  (1950), who is being seen today for an episodic care visit.  HPI information obtained from: facility chart records, facility staff, patient report and [facility] chart review.    Today's concern is:     Skin infection  Pustule of nostril  Folliculitis     Patient seen today regarding nare pustule reported by staff on 1/21, seen today for evaluation, R nare slightly swollen, R and mid-septum pink tone skin intact, was previously started on keflex, mupirocin after WBC count of 15.5 on 1/21, appears to be resolving as face swelling decreased, surrounding tissue intact, denies pain with moderate palpation, overall status stable.    ALLERGIES: Interferons  Past Medical, Surgical, Family and Social History reviewed and updated in [facility].    Current Outpatient Medications   Medication Sig Dispense Refill     ACETAMINOPHEN  mg by Gastric Tube route 3 times daily Also 650 per g tube daily as needed       atenolol (TENORMIN) 50 MG tablet 50 mg by Per G Tube route daily       atorvastatin (LIPITOR) 10 MG tablet 10 mg by Gastric Tube route daily       cephALEXin (KEFLEX) 500 MG capsule 500 mg by Per G Tube route 3 times daily       docusate (COLACE) 50 MG/5ML liquid 100 mg by Gastronomy tube route 2 times daily        fluticasone-salmeterol (ADVAIR) 500-50 MCG/DOSE diskus inhaler Inhale 1 puff into the lungs 2 times daily       ipratropium - albuterol 0.5 mg/2.5 mg/3 mL (DUONEB) 0.5-2.5 (3) MG/3ML neb solution Take 1 vial by nebulization 4 times daily And q4h PRN       LEVOTHYROXINE SODIUM PO 25 mcg by Gastric Tube route daily       mupirocin (BACTROBAN) 2 % external ointment Apply topically 4 times daily       valproic acid (DEPAKENE) 250  "MG/5ML SOLN syrup 500 mg by Gastric Tube route 2 times daily        Medications reviewed:  Medications reconciled to facility chart and changes were made to reflect current medications as identified as above med list. Below are the changes that were made:   Medications stopped since last EPIC medication reconciliation:   There are no discontinued medications.    Medications started since last [facility] medication reconciliation:  No orders of the defined types were placed in this encounter.      REVIEW OF SYSTEMS:  4 point ROS including Respiratory, CV, GI and , other than that noted in the HPI,  is negative    Physical Exam:  /83   Pulse 72   Temp 97.5  F (36.4  C)   Resp 20   Ht 1.651 m (5' 5\")   Wt 80.6 kg (177 lb 11.2 oz)   SpO2 93%   BMI 29.57 kg/m    GENERAL APPEARANCE:  Alert, in no distress  ENT:  Mouth and posterior oropharynx normal, moist mucous membranes  RESP:  lungs clear to auscultation   CV:  regular rate and rhythm, no murmur, rub, or gallop, no edema  ABDOMEN:  bowel sounds normal  M/S:   Gait and station normal  SKIN:  Inspection of skin and subcutaneous tissueper HPI  NEURO:   Examination of sensation by touch normal  PSYCH:  oriented to self, affect and mood normal    Recent Labs:     CBC RESULTS:   Recent Labs   Lab Test 01/21/19 12/17/18   WBC 15.5* 20.5*   RBC 4.79 4.50   HGB 14.4 13.6*   HCT 44.5 43.2   MCV 93 96   MCH 30.1 30.2   MCHC 32.4 31.5*   RDW 15.4* 14.7*    287       Last Basic Metabolic Panel:  Recent Labs   Lab Test 12/13/18 10/09/18    138   POTASSIUM 4.5 5.0   CHLORIDE 104 103   BRYAN 9.6 8.9   CO2 25 20*   BUN 18 15   CR 0.57* 0.53*   GLC 93 81       Liver Function Studies -   Recent Labs   Lab Test 12/13/18 05/15/18  1221   PROTTOTAL 7.7 8.3   ALBUMIN 3.3* 3.3*   BILITOTAL 0.4 0.4   ALKPHOS 59 45   AST 16 20   ALT 17 22       TSH   Date Value Ref Range Status   12/13/2018 1.36 0.30 - 5.00 uIU/mL Final   05/15/2018 3.66 0.40 - 4.00 mU/L Final "         Assessment/Plan:  Skin infection  Pustule of nostril  Folliculitis  -patient has chronic cough, nasal drainage  -considering folliculitis with bacterial insult  -keflex 500mg TID x10d  -mupirocin apply bilateral nares and septum until healed  -appears to be resolving, status improved in past 48 hours, afebrile at 97.5 today  -staff to follow, if status changes to inform NP/on-call        Electronically signed by  SATINDER Resendiz CNP  [facility]

## 2019-03-14 VITALS
WEIGHT: 173.3 LBS | HEIGHT: 65 IN | OXYGEN SATURATION: 95 % | SYSTOLIC BLOOD PRESSURE: 110 MMHG | BODY MASS INDEX: 28.87 KG/M2 | DIASTOLIC BLOOD PRESSURE: 74 MMHG | RESPIRATION RATE: 18 BRPM | HEART RATE: 68 BPM | TEMPERATURE: 97.6 F

## 2019-03-14 ASSESSMENT — MIFFLIN-ST. JEOR: SCORE: 1477.96

## 2019-03-14 NOTE — PROGRESS NOTES
[place] [facility]  Chief Complaint   Patient presents with     half-way Regulatory     [place] Medical Record Number:  [id number]  Place of Service where encounter took place:  [facility] ([facility] [[id number]]      HPI:    Gaudencio Avila  is 68 year old (1950), who is being seen today for a federally mandated E/M visit.  HPI information obtained from: facility chart records, facility staff, patient report and [facility] chart review. Today's concerns are:     Seizure disorder (H)  Essential hypertension  History of traumatic brain injury  Dysphagia causing pulmonary aspiration with swallowing  Pulmonary emphysema, unspecified emphysema type (H)  Acquired hypothyroidism     Patient seen today, only concern is to be allowed to do tube feedings se he can move to senior apartment or AL facility, per social work waiting for MS to be approved before doing anything, pleasantly confused, sociable, plan is for tube feeding probable lifetime, overall status stable.    ALLERGIES:Interferons  PAST MEDICAL HISTORY:   has a past medical history of Aspiration pneumonias, Colon adenoma (3/2013), COPD (chronic obstructive pulmonary disease) (H), Depression, major, Dysphagia causing pulmonary aspiration with swallowing (10/27/2010), GERD (gastroesophageal reflux disease) (11/15/2010), HDL deficiency, Hepatitis C, chronic (H), HTN (hypertension), Hyperlipidemia, Moderate persistent asthma, Osteoma of ear canal, Pelvic fracture (H) (1973), Pneumonopathy due to inhalation of dust (H), and Traumatic brain injury (H) (1973).  PAST SURGICAL HISTORY:   has a past surgical history that includes fracture tx, ankle rt/lt; Laparotomy exploratory (1973); and Biopsy liver (2007).  FAMILY HISTORY: family history includes Breast Cancer in his mother; C.A.D. in his mother; Diabetes in his brother; Hypertension in his mother; Respiratory in his brother.  SOCIAL HISTORY:  reports that he quit smoking about 49  "years ago. His smoking use included cigarettes. He has a 2.00 pack-year smoking history. he has never used smokeless tobacco. He reports that he does not drink alcohol or use drugs.    MEDICATIONS:  Current Outpatient Medications   Medication Sig Dispense Refill     ACETAMINOPHEN  mg by Gastric Tube route 3 times daily Also 650 per g tube daily as needed       atenolol (TENORMIN) 50 MG tablet 50 mg by Per G Tube route daily       atorvastatin (LIPITOR) 10 MG tablet 10 mg by Gastric Tube route daily       docusate (COLACE) 50 MG/5ML liquid 100 mg by Gastronomy tube route 2 times daily        fluticasone-salmeterol (ADVAIR) 500-50 MCG/DOSE diskus inhaler Inhale 1 puff into the lungs 2 times daily       ipratropium - albuterol 0.5 mg/2.5 mg/3 mL (DUONEB) 0.5-2.5 (3) MG/3ML neb solution Take 1 vial by nebulization 4 times daily And q4h PRN       LEVOTHYROXINE SODIUM PO 25 mcg by Gastric Tube route daily       valproic acid (DEPAKENE) 250 MG/5ML SOLN syrup 500 mg by Gastric Tube route 2 times daily          Case Management:  I have reviewed the care plan and MDS and do agree with the plan. Patient's desire to return to the community is present, but is not able due to care needs . Information reviewed:  Medications, vital signs, orders, and nursing notes.    ROS:  Limited secondary to cognitive impairment but today pt reports wanting to go \"home\"    Vitals:  /74   Pulse 68   Temp 97.6  F (36.4  C)   Resp 18   Ht 1.651 m (5' 5\")   Wt 78.6 kg (173 lb 4.8 oz)   SpO2 95%   BMI 28.84 kg/m    Body mass index is 28.84 kg/m .  Exam:  GENERAL APPEARANCE:  in no distress, appears healthy  ENT:  Mouth and posterior oropharynx normal, moist mucous membranes, poor dentition  RESP:  lungs clear to auscultation , no respiratory distress  CV:  regular rate and rhythm, no murmur, rub, or gallop, no edema  ABDOMEN:  bowel sounds normal  M/S:   Gait and station normal  SKIN:  Inspection of skin and subcutaneous tissue " baseline  NEURO:   Examination of sensation by touch normal  PSYCH:  oriented to self, affect and mood normal    Lab/Diagnostic data:   Labs done in SNF are in [place] EPIC. Please refer to them using [employer]/Care Everywhere.    ASSESSMENT/PLAN  Seizure disorder (H)  -no recent s/s seizure activity  -continue depakote 500mg BID for now  -would consider weaning in future    Essential hypertension  -SBP's in the 110-130 range, asymptomatic when low  -ambulatory independent, walks quite a bit in facility  -continue atenolol 50mg for now, stable    History of traumatic brain injury  Dysphagia causing pulmonary aspiration with swallowing  Pulmonary emphysema, unspecified emphysema type (H)  -has been followed by facility ST, recommendation to continue NPO  -has appt with speech pathology on 3/11/19 with modified barium swallow study, has severe oral dysphagia with prolonged oral transit and incomplete mastication of foods r/t poor dentition, impaired swallow initiation, delayed pharyngeal response, oropharyngeal residue, persistent, aspiration risk, prognosis poor for safe oral intake  -continue tube feeds    Acquired hypothyroidism  -TSH in Dec 2018 was 1.36  -continue levothyroxine 25mcg daily  -plan to follow up TSH in 3-6 months        Electronically signed by:  SATINDER Resendiz CNP

## 2019-03-15 ENCOUNTER — NURSING HOME VISIT (OUTPATIENT)
Dept: GERIATRICS | Facility: CLINIC | Age: 69
End: 2019-03-15
Payer: COMMERCIAL

## 2019-03-15 DIAGNOSIS — G40.909 SEIZURE DISORDER (H): Primary | ICD-10-CM

## 2019-03-15 DIAGNOSIS — J43.9 PULMONARY EMPHYSEMA, UNSPECIFIED EMPHYSEMA TYPE (H): ICD-10-CM

## 2019-03-15 DIAGNOSIS — E03.9 ACQUIRED HYPOTHYROIDISM: ICD-10-CM

## 2019-03-15 DIAGNOSIS — Z87.820 HISTORY OF TRAUMATIC BRAIN INJURY: ICD-10-CM

## 2019-03-15 DIAGNOSIS — R13.19 DYSPHAGIA CAUSING PULMONARY ASPIRATION WITH SWALLOWING: ICD-10-CM

## 2019-03-15 DIAGNOSIS — I10 ESSENTIAL HYPERTENSION: ICD-10-CM

## 2019-03-15 PROCEDURE — 99309 SBSQ NF CARE MODERATE MDM 30: CPT | Performed by: NURSE PRACTITIONER

## 2019-05-15 VITALS
DIASTOLIC BLOOD PRESSURE: 79 MMHG | BODY MASS INDEX: 27.36 KG/M2 | WEIGHT: 164.2 LBS | RESPIRATION RATE: 18 BRPM | SYSTOLIC BLOOD PRESSURE: 110 MMHG | OXYGEN SATURATION: 95 % | HEART RATE: 68 BPM | TEMPERATURE: 97.6 F | HEIGHT: 65 IN

## 2019-05-15 ASSESSMENT — MIFFLIN-ST. JEOR: SCORE: 1431.69

## 2019-05-17 ENCOUNTER — NURSING HOME VISIT (OUTPATIENT)
Dept: GERIATRICS | Facility: CLINIC | Age: 69
End: 2019-05-17
Payer: COMMERCIAL

## 2019-05-17 DIAGNOSIS — J44.9 CHRONIC OBSTRUCTIVE PULMONARY DISEASE, UNSPECIFIED COPD TYPE (H): ICD-10-CM

## 2019-05-17 DIAGNOSIS — R13.10 DYSPHAGIA, UNSPECIFIED TYPE: Primary | ICD-10-CM

## 2019-05-17 DIAGNOSIS — I10 ESSENTIAL HYPERTENSION: ICD-10-CM

## 2019-05-17 PROCEDURE — 99309 SBSQ NF CARE MODERATE MDM 30: CPT | Performed by: INTERNAL MEDICINE

## 2019-05-17 NOTE — LETTER
5/17/2019        RE: Gaudencio Avila  [facility]  [address]  [place] [address]        [place] [facility]  Nursing Home Regulatory Visit  May 17, 2019    Chief Complaint   Patient presents with     RECHECK       HPI:    Gaudencio Avila is a 69 year old  (1950), who is being seen today for a federally mandated E/M visit at [facility]. Today's concerns are:    1) Dysphagia s/p PEG (9/14/18) -- Was seen by [facility] on 2/19/19 and then had a barium swallow on 3/11/19 that demonstrated aspiration with HTL, NTL and thin as well as applesauce without cough response. Continues NPO and receives tube feeds  2) COPD -- No signs of exacerbation. Managed with Advair and DuoNebs  3) HTN -- SBPs 110s-130s overall on atenolol       ALLERGIES: Interferons    PAST MEDICAL HISTORY:   Past Medical History:   Diagnosis Date     Aspiration pneumonias      Colon adenoma 3/2013     COPD (chronic obstructive pulmonary disease) (H)      Depression, major      Dysphagia causing pulmonary aspiration with swallowing 10/27/2010     GERD (gastroesophageal reflux disease) 11/15/2010     HDL deficiency      Hepatitis C, chronic (H)     [place] [facility] - treated, resolved      HTN (hypertension)      Hyperlipidemia      Moderate persistent asthma      Osteoma of ear canal      Pelvic fracture (H) 1973     Pneumonopathy due to inhalation of dust (H)      Traumatic brain injury (H) 1973       PAST SURGICAL HISTORY:   Past Surgical History:   Procedure Laterality Date     BIOPSY LIVER  2007     FRACTURE TX, ANKLE RT/LT       LAPAROTOMY EXPLORATORY  1973    splenic rupture, liver laceration       FAMILY HISTORY:   Family History   Problem Relation Age of Onset     Breast Cancer Mother      C.A.D. Mother      Hypertension Mother      Respiratory Brother         MEDARDO     Diabetes Brother      Cancer - colorectal No family hx of        SOCIAL HISTORY:   Lives in a  "SNF    MEDICATIONS:  Current Outpatient Medications   Medication Sig Dispense Refill     ACETAMINOPHEN  mg by Gastric Tube route 3 times daily Also 650 per g tube daily as needed       atenolol (TENORMIN) 50 MG tablet 50 mg by Per G Tube route daily       atorvastatin (LIPITOR) 10 MG tablet 10 mg by Gastric Tube route daily       docusate (COLACE) 50 MG/5ML liquid 100 mg by Gastronomy tube route 2 times daily        fluticasone-salmeterol (ADVAIR) 500-50 MCG/DOSE diskus inhaler Inhale 1 puff into the lungs 2 times daily       ipratropium - albuterol 0.5 mg/2.5 mg/3 mL (DUONEB) 0.5-2.5 (3) MG/3ML neb solution Take 1 vial by nebulization 4 times daily And q4h PRN       LEVOTHYROXINE SODIUM PO 25 mcg by Gastric Tube route daily       valproic acid (DEPAKENE) 250 MG/5ML SOLN syrup 500 mg by Gastric Tube route 2 times daily          Medications reviewed:  Medications reconciled to facility chart and changes were made to reflect current medications as identified as above med list. Below are the changes that were made:   Medications stopped since last EPIC medication reconciliation:   There are no discontinued medications.  Medications started since last [facility] medication reconciliation:  No orders of the defined types were placed in this encounter.      Case Management:  I have reviewed the care plan and MDS and do agree with the plan.   Information reviewed:  Medications, vital signs, orders, and nursing notes.    ROS:  4 point ROS neg other than the symptoms noted above in the HP    PHYSICAL EXAM:  /79   Pulse 68   Temp 97.6  F (36.4  C)   Resp 18   Ht 1.651 m (5' 5\")   Wt 74.5 kg (164 lb 3.2 oz)   SpO2 95%   BMI 27.32 kg/m     Gen: sitting in chair, alert, cooperative and in no acute distress  Resp: breathing non labored  GI: abdomen soft, not-tender  Ext: no LE edema  Neuro: CX II-XII grossly in tact; ROM in all four extremities grossly in tact  Psych: alert and oriented to self and general sitaution; " normal affect    Lab/Diagnostic data:  Reviewed as per Epic    ASSESSMENT/PLAN    1) Dysphagia s/p PEG (9/14/18)  Underlying TBI x1. Barium swallow on 3/11/19 demonstrated aspiration with HTL, NTL and thin as well as applesauce without cough response.   --  NPO   -- continues on Isosource 1.5 bolus of 2 cans BID and 1 can at HS for a total of 5 cans per day   -- continues on 225 ml water QID after meals and at bedtime  -- nutrition following    2) COPD   No signs of exacerbation.  -- continues on Advair 500-50 mcg BID and DuoNebs QID and q4h PRN    3) HTN   SBPs 110s-130s overall   -- continues on atenolol 50 mg daily  -- follow BPs and adjust medications as needed        Electronically signed by:  Em Rivera MD        Sincerely,        Em Rivera MD

## 2019-05-17 NOTE — PROGRESS NOTES
[place] [facility]  Nursing Home Regulatory Visit  May 17, 2019    Chief Complaint   Patient presents with     RECHECK       HPI:    Gaudencio Avila is a 69 year old  (1950), who is being seen today for a federally mandated E/M visit at [facility]. Today's concerns are:    1) Dysphagia s/p PEG (9/14/18) -- Was seen by [facility] on 2/19/19 and then had a barium swallow on 3/11/19 that demonstrated aspiration with HTL, NTL and thin as well as applesauce without cough response. Continues NPO and receives tube feeds  2) COPD -- No signs of exacerbation. Managed with Advair and DuoNebs  3) HTN -- SBPs 110s-130s overall on atenolol       ALLERGIES: Interferons    PAST MEDICAL HISTORY:   Past Medical History:   Diagnosis Date     Aspiration pneumonias      Colon adenoma 3/2013     COPD (chronic obstructive pulmonary disease) (H)      Depression, major      Dysphagia causing pulmonary aspiration with swallowing 10/27/2010     GERD (gastroesophageal reflux disease) 11/15/2010     HDL deficiency      Hepatitis C, chronic (H)     [facility] - treated, resolved      HTN (hypertension)      Hyperlipidemia      Moderate persistent asthma      Osteoma of ear canal      Pelvic fracture (H) 1973     Pneumonopathy due to inhalation of dust (H)      Traumatic brain injury (H) 1973       PAST SURGICAL HISTORY:   Past Surgical History:   Procedure Laterality Date     BIOPSY LIVER  2007     FRACTURE TX, ANKLE RT/LT       LAPAROTOMY EXPLORATORY  1973    splenic rupture, liver laceration       FAMILY HISTORY:   Family History   Problem Relation Age of Onset     Breast Cancer Mother      C.A.D. Mother      Hypertension Mother      Respiratory Brother         MEDARDO     Diabetes Brother      Cancer - colorectal No family hx of        SOCIAL HISTORY:   Lives in a SNF    MEDICATIONS:  Current Outpatient Medications   Medication Sig Dispense Refill     ACETAMINOPHEN  mg by Gastric Tube route 3 times daily Also  "650 per g tube daily as needed       atenolol (TENORMIN) 50 MG tablet 50 mg by Per G Tube route daily       atorvastatin (LIPITOR) 10 MG tablet 10 mg by Gastric Tube route daily       docusate (COLACE) 50 MG/5ML liquid 100 mg by Gastronomy tube route 2 times daily        fluticasone-salmeterol (ADVAIR) 500-50 MCG/DOSE diskus inhaler Inhale 1 puff into the lungs 2 times daily       ipratropium - albuterol 0.5 mg/2.5 mg/3 mL (DUONEB) 0.5-2.5 (3) MG/3ML neb solution Take 1 vial by nebulization 4 times daily And q4h PRN       LEVOTHYROXINE SODIUM PO 25 mcg by Gastric Tube route daily       valproic acid (DEPAKENE) 250 MG/5ML SOLN syrup 500 mg by Gastric Tube route 2 times daily          Medications reviewed:  Medications reconciled to facility chart and changes were made to reflect current medications as identified as above med list. Below are the changes that were made:   Medications stopped since last EPIC medication reconciliation:   There are no discontinued medications.  Medications started since last [facility] medication reconciliation:  No orders of the defined types were placed in this encounter.      Case Management:  I have reviewed the care plan and MDS and do agree with the plan.   Information reviewed:  Medications, vital signs, orders, and nursing notes.    ROS:  4 point ROS neg other than the symptoms noted above in the HP    PHYSICAL EXAM:  /79   Pulse 68   Temp 97.6  F (36.4  C)   Resp 18   Ht 1.651 m (5' 5\")   Wt 74.5 kg (164 lb 3.2 oz)   SpO2 95%   BMI 27.32 kg/m    Gen: sitting in chair, alert, cooperative and in no acute distress  Resp: breathing non labored  GI: abdomen soft, not-tender  Ext: no LE edema  Neuro: CX II-XII grossly in tact; ROM in all four extremities grossly in tact  Psych: alert and oriented to self and general sitaution; normal affect    Lab/Diagnostic data:  Reviewed as per Epic    ASSESSMENT/PLAN    1) Dysphagia s/p PEG (9/14/18)  Underlying TBI x1. Barium swallow on " 3/11/19 demonstrated aspiration with HTL, NTL and thin as well as applesauce without cough response.   --  NPO   -- continues on Isosource 1.5 bolus of 2 cans BID and 1 can at HS for a total of 5 cans per day   -- continues on 225 ml water QID after meals and at bedtime  -- nutrition following    2) COPD   No signs of exacerbation.  -- continues on Advair 500-50 mcg BID and DuoNebs QID and q4h PRN    3) HTN   SBPs 110s-130s overall   -- continues on atenolol 50 mg daily  -- follow BPs and adjust medications as needed        Electronically signed by:  Em Rivera MD

## 2019-06-24 ENCOUNTER — TRANSFERRED RECORDS (OUTPATIENT)
Dept: HEALTH INFORMATION MANAGEMENT | Facility: CLINIC | Age: 69
End: 2019-06-24

## 2019-06-24 ENCOUNTER — RECORDS - HEALTHEAST (OUTPATIENT)
Dept: LAB | Facility: CLINIC | Age: 69
End: 2019-06-24

## 2019-06-24 LAB
ANION GAP SERPL CALCULATED.3IONS-SCNC: 13 MMOL/L (ref 5–18)
ANION GAP SERPL CALCULATED.3IONS-SCNC: 13 MMOL/L (ref 5–18)
BUN SERPL-MCNC: 14 MG/DL (ref 8–28)
BUN SERPL-MCNC: 14 MG/DL (ref 8–28)
CALCIUM SERPL-MCNC: 9.5 MG/DL (ref 8.5–10.5)
CALCIUM SERPL-MCNC: 9.5 MG/DL (ref 8.5–10.5)
CHLORIDE BLD-SCNC: 99 MMOL/L (ref 98–107)
CHLORIDE SERPLBLD-SCNC: 99 MMOL/L (ref 98–107)
CO2 SERPL-SCNC: 23 MMOL/L (ref 22–31)
CO2 SERPL-SCNC: 23 MMOL/L (ref 22–31)
CREAT SERPL-MCNC: 0.54 MG/DL (ref 0.7–1.3)
CREAT SERPL-MCNC: 0.54 MG/DL (ref 0.7–1.3)
GFR SERPL CREATININE-BSD FRML MDRD: >60 ML/MIN/1.73M2
GFR SERPL CREATININE-BSD FRML MDRD: >60 ML/MIN/1.73M2
GLUCOSE BLD-MCNC: 64 MG/DL (ref 70–125)
GLUCOSE SERPL-MCNC: 64 MG/DL (ref 70–125)
HGB BLD-MCNC: 14 G/DL (ref 14–18)
POTASSIUM BLD-SCNC: 4.6 MMOL/L (ref 3.5–5)
POTASSIUM SERPL-SCNC: 4.6 MMOL/L (ref 3.5–5)
PREALB SERPL-MCNC: 22.2 MG/DL (ref 19–38)
SODIUM SERPL-SCNC: 135 MMOL/L (ref 136–145)
SODIUM SERPL-SCNC: 135 MMOL/L (ref 136–145)
TSH SERPL DL<=0.005 MIU/L-ACNC: 1.33 UIU/ML (ref 0.3–5)
TSH SERPL-ACNC: 1.33 UIU/ML (ref 0.3–5)
VALPROATE SERPL-MCNC: 39.7 UG/ML (ref 50–150)

## 2019-07-19 ENCOUNTER — TELEPHONE (OUTPATIENT)
Dept: GERIATRICS | Facility: CLINIC | Age: 69
End: 2019-07-19

## 2019-07-20 NOTE — TELEPHONE ENCOUNTER
Called re: fall without injury tonight. Reportedly per RN he  has h/o frequent falls and primary is aware. Sending as FYI to PCP.

## 2019-07-25 NOTE — PROGRESS NOTES
[place] [facility]  Chief Complaint   Patient presents with     senior care Regulatory     [place] Medical Record Number:  [id number]  Place of Service where encounter took place:  [facility] ([facility]) [[id number]]    HPI:    Gaudencio Avila  is 69 year old (1950), who is being seen today for a federally mandated E/M visit.  HPI information obtained from: facility chart records, facility staff, patient report and [facility] chart review. Today's concerns are:     Dysphagia causing pulmonary aspiration with swallowing  G tube feedings (H)  Recurrent falls  Acquired hypothyroidism     Patient seen today in room, has TF in place with bolus feedings, has been followed by ST and also had recetn swallow study completed, recent falls without injury, has been borrowing other residents walkers, fairly steady with support, recent TSH 1.33 on 6/24/19, overall status stable.    ALLERGIES:Interferons  PAST MEDICAL HISTORY:   has a past medical history of Aspiration pneumonias, Colon adenoma (3/2013), COPD (chronic obstructive pulmonary disease) (H), Depression, major, Dysphagia causing pulmonary aspiration with swallowing (10/27/2010), GERD (gastroesophageal reflux disease) (11/15/2010), HDL deficiency, Hepatitis C, chronic (H), HTN (hypertension), Hyperlipidemia, Moderate persistent asthma, Osteoma of ear canal, Pelvic fracture (H) (1973), Pneumonopathy due to inhalation of dust (H), and Traumatic brain injury (H) (1973).  PAST SURGICAL HISTORY:   has a past surgical history that includes fracture tx, ankle rt/lt; Laparotomy exploratory (1973); and Biopsy liver (2007).  FAMILY HISTORY: family history includes Breast Cancer in his mother; C.A.D. in his mother; Diabetes in his brother; Hypertension in his mother; Respiratory in his brother.  SOCIAL HISTORY:  reports that he quit smoking about 49 years ago. His smoking use included cigarettes. He has a 2.00 pack-year smoking history. He has  never used smokeless tobacco. He reports that he does not drink alcohol or use drugs.    MEDICATIONS:  Current Outpatient Medications   Medication Sig Dispense Refill     ACETAMINOPHEN  mg by Gastric Tube route 3 times daily Also 650 per g tube daily as needed       atenolol (TENORMIN) 50 MG tablet 50 mg by Per G Tube route daily       atorvastatin (LIPITOR) 10 MG tablet 10 mg by Gastric Tube route daily       docusate (COLACE) 50 MG/5ML liquid 100 mg by Gastronomy tube route 2 times daily        fluticasone-salmeterol (ADVAIR) 500-50 MCG/DOSE diskus inhaler Inhale 1 puff into the lungs 2 times daily       guaiFENesin (ROBITUSSIN) 20 mg/mL SOLN Take 20 mLs by mouth daily as needed for cough       ipratropium - albuterol 0.5 mg/2.5 mg/3 mL (DUONEB) 0.5-2.5 (3) MG/3ML neb solution Take 1 vial by nebulization 4 times daily And q4h PRN       LEVOTHYROXINE SODIUM PO 25 mcg by Gastric Tube route daily       valproic acid (DEPAKENE) 250 MG/5ML SOLN syrup 500 mg by Gastric Tube route 2 times daily          Case Management:  I have reviewed the care plan and MDS and do agree with the plan. Patient's desire to return to the community is present, but is not able due to care needs . Information reviewed:  Medications, vital signs, orders, and nursing notes.    ROS:  4 point ROS including Respiratory, CV, GI and , other than that noted in the HPI,  is negative    Vitals:  /77   Pulse 70   Temp 97.6  F (36.4  C)   SpO2 95%   There is no height or weight on file to calculate BMI.  Exam:  GENERAL APPEARANCE:  in no distress, appears healthy  ENT:  Mouth and posterior oropharynx normal, moist mucous membranes  RESP:  crackles all fields  CV:  regular rate and rhythm, no murmur, rub, or gallop, peripheral edema scant+ in lower legs  ABDOMEN:  bowel sounds normal  M/S:   Gait and station abnormal weak, unsteady  SKIN:  Inspection of skin and subcutaneous tissue baseline  NEURO:   Examination of sensation by touch  normal  PSYCH:  oriented to self, memory impaired     Lab/Diagnostic data:   Labs done in SNF are in [place] EPIC. Please refer to them using "[employer]/Care Everywhere.    ASSESSMENT/PLAN  Dysphagia causing pulmonary aspiration with swallowing  G tube feedings (H)  -seen by speech pathology/ENT on 3/11 with barium swallow study  -continue tube feeds as bolus TID, considering probable lifetime  -followed by dietary  -send in for TF changes as scheduled      Recurrent falls  -weak, unsteady, intermittent, appears stable today  -Therapies to evaluate  -OT to obtain walker for personal use (versus sharing others)    Acquired hypothyroidism  -TSH on 6/24/19 was 1.33  -continue levothyroxine 25mcg  -plan to recheck TSH in 3-6 months, if still <2.0 then will plan to discontinue levothyroxine          Electronically signed by:  SATINDER Resendiz CNP

## 2019-07-26 ENCOUNTER — NURSING HOME VISIT (OUTPATIENT)
Dept: GERIATRICS | Facility: CLINIC | Age: 69
End: 2019-07-26
Payer: COMMERCIAL

## 2019-07-26 VITALS
OXYGEN SATURATION: 95 % | DIASTOLIC BLOOD PRESSURE: 77 MMHG | TEMPERATURE: 97.6 F | HEART RATE: 70 BPM | SYSTOLIC BLOOD PRESSURE: 112 MMHG

## 2019-07-26 DIAGNOSIS — R13.19 DYSPHAGIA CAUSING PULMONARY ASPIRATION WITH SWALLOWING: Primary | ICD-10-CM

## 2019-07-26 DIAGNOSIS — R29.6 RECURRENT FALLS: ICD-10-CM

## 2019-07-26 DIAGNOSIS — Z93.1 G TUBE FEEDINGS (H): ICD-10-CM

## 2019-07-26 DIAGNOSIS — E03.9 ACQUIRED HYPOTHYROIDISM: ICD-10-CM

## 2019-07-26 PROCEDURE — 99309 SBSQ NF CARE MODERATE MDM 30: CPT | Performed by: NURSE PRACTITIONER

## 2019-07-26 RX ORDER — GUAIFENESIN 100 MG/5ML
20 LIQUID ORAL DAILY PRN
COMMUNITY
End: 2019-11-10

## 2019-07-29 ENCOUNTER — CLINICAL UPDATE (OUTPATIENT)
Dept: PHARMACY | Facility: CLINIC | Age: 69
End: 2019-07-29

## 2019-07-29 NOTE — PROGRESS NOTES
This patient's medication list and chart were reviewed as part of the service provided by [facility].    Assessment/Recommendations:  1. (HTN):    BP goals are <150/90 mm Hg.This is higher than ACC and AHA recommendations due to risk for hypotension, risk of dizziness and falls, risk of tissue/cerebral hypoperfusion and frailty. BPs appear well below goal per chart review, and patient may benefit from reduction in Atenolol to 25mg daily.  2. (Constipation):  Docusate tends to be minimally effective med and may not be offering much benefit, but contributing to pill load.  Consider d'c and monitor.  3. (h/o Seizures):  If no history of late onset seizures following TBI, may consider potential reduction in Depakote dose to 250mg in the am and 500mg in the pm and monitor.  If tolerates dose reduction after a month, consider further gradual dose reduction with continued monitoring.  4. (COPD):  Please note pt is receiving duplicate scheduled beta agonist currently (via Advair, receiving a LABA, and via Duoneb, EVI).  May consider d'c Advair and use Budesonide nebs 0.5mg bid (can mix with Duoneb bid), and continue with Duoneb qid and prn.  Excessive beta agonist use may contribute to anxiety/agitation, tachycardia, nervousness, etc.    Isidra Aceves, Pharm.D.,[facility]  Board Certified Geriatric Pharmacist  Medication Therapy Management Pharmacist  [phone]

## 2019-07-31 ENCOUNTER — RECORDS - HEALTHEAST (OUTPATIENT)
Dept: LAB | Facility: CLINIC | Age: 69
End: 2019-07-31

## 2019-07-31 ENCOUNTER — TRANSFERRED RECORDS (OUTPATIENT)
Dept: HEALTH INFORMATION MANAGEMENT | Facility: CLINIC | Age: 69
End: 2019-07-31

## 2019-07-31 LAB
ANION GAP SERPL CALCULATED.3IONS-SCNC: 7 MMOL/L (ref 5–18)
ANION GAP SERPL CALCULATED.3IONS-SCNC: 7 MMOL/L (ref 5–18)
BUN SERPL-MCNC: 12 MG/DL (ref 8–28)
BUN SERPL-MCNC: 12 MG/DL (ref 8–28)
CALCIUM SERPL-MCNC: 9.6 MG/DL (ref 8.5–10.5)
CALCIUM SERPL-MCNC: 9.6 MG/DL (ref 8.5–10.5)
CHLORIDE BLD-SCNC: 99 MMOL/L (ref 98–107)
CHLORIDE SERPLBLD-SCNC: 99 MMOL/L (ref 98–107)
CO2 SERPL-SCNC: 26 MMOL/L (ref 22–31)
CO2 SERPL-SCNC: 26 MMOL/L (ref 22–31)
CREAT SERPL-MCNC: 0.73 MG/DL (ref 0.7–1.3)
CREAT SERPL-MCNC: 0.73 MG/DL (ref 0.7–1.3)
GFR SERPL CREATININE-BSD FRML MDRD: >60 ML/MIN/1.73M2
GFR SERPL CREATININE-BSD FRML MDRD: >60 ML/MIN/1.73M2
GLUCOSE BLD-MCNC: 76 MG/DL (ref 70–125)
GLUCOSE SERPL-MCNC: 76 MG/DL (ref 70–125)
POTASSIUM BLD-SCNC: 4.6 MMOL/L (ref 3.5–5)
POTASSIUM SERPL-SCNC: 4.6 MMOL/L (ref 3.5–5)
SODIUM SERPL-SCNC: 132 MMOL/L (ref 136–145)
SODIUM SERPL-SCNC: 132 MMOL/L (ref 136–145)

## 2019-09-13 ENCOUNTER — NURSING HOME VISIT (OUTPATIENT)
Dept: GERIATRICS | Facility: CLINIC | Age: 69
End: 2019-09-13
Payer: COMMERCIAL

## 2019-09-13 VITALS
OXYGEN SATURATION: 94 % | DIASTOLIC BLOOD PRESSURE: 81 MMHG | SYSTOLIC BLOOD PRESSURE: 128 MMHG | HEART RATE: 58 BPM | TEMPERATURE: 97.6 F

## 2019-09-13 DIAGNOSIS — R13.10 DYSPHAGIA, UNSPECIFIED TYPE: ICD-10-CM

## 2019-09-13 DIAGNOSIS — J44.9 CHRONIC OBSTRUCTIVE PULMONARY DISEASE, UNSPECIFIED COPD TYPE (H): Primary | ICD-10-CM

## 2019-09-13 DIAGNOSIS — I10 ESSENTIAL HYPERTENSION: ICD-10-CM

## 2019-09-13 PROCEDURE — 99309 SBSQ NF CARE MODERATE MDM 30: CPT | Performed by: INTERNAL MEDICINE

## 2019-09-13 NOTE — LETTER
9/13/2019        RE: Gaudencio Avila  [facility]ly [facility]  [address]  [place] [address]        [place]  Nursing Home Regulatory Visit  September 13, 2019    Chief Complaint   Patient presents with     long term Regulatory       HPI:    Gaudencio Avila is a 69 year old  (1950), who is being seen today for a federally mandated E/M visit at [facility]. Today's concerns are:    1) COPD -- No signs of exacerbation. Managed with DuoNebs  2) HTN -- SBPs 110s-130s overall on atenolol   3) Dysphagia s/p PEG (9/14/18) -- Underlying TBI. No recent pneumonias. He remains NPO on bolus tube feeds. Most recent Barium swallow in March of this year which demonstrated no cough response and aspiration with HTL, NTL and thin liquids.     ALLERGIES: Interferons    PAST MEDICAL HISTORY:   Past Medical History:   Diagnosis Date     Aspiration pneumonias      Colon adenoma 3/2013     COPD (chronic obstructive pulmonary disease) (H)      Depression, major      Dysphagia causing pulmonary aspiration with swallowing 10/27/2010     GERD (gastroesophageal reflux disease) 11/15/2010     HDL deficiency      Hepatitis C, chronic (H)     [facility], [facility] - treated, resolved      HTN (hypertension)      Hyperlipidemia      Moderate persistent asthma      Osteoma of ear canal      Pelvic fracture (H) 1973     Pneumonopathy due to inhalation of dust (H)      Traumatic brain injury (H) 1973       PAST SURGICAL HISTORY:   Past Surgical History:   Procedure Laterality Date     BIOPSY LIVER  2007     FRACTURE TX, ANKLE RT/LT       LAPAROTOMY EXPLORATORY  1973    splenic rupture, liver laceration       FAMILY HISTORY:   Family History   Problem Relation Age of Onset     Breast Cancer Mother      C.A.D. Mother      Hypertension Mother      Respiratory Brother         MEDARDO     Diabetes Brother      Cancer - colorectal No family hx of        SOCIAL HISTORY:   Lives in a  SNF    MEDICATIONS:  Current Outpatient Medications   Medication Sig Dispense Refill     ACETAMINOPHEN  mg by Gastric Tube route 3 times daily Also 650 per g tube daily as needed       atenolol (TENORMIN) 50 MG tablet 50 mg by Per G Tube route daily       atorvastatin (LIPITOR) 10 MG tablet 10 mg by Gastric Tube route daily       guaiFENesin (ROBITUSSIN) 20 mg/mL SOLN Take 20 mLs by mouth daily as needed for cough       ipratropium - albuterol 0.5 mg/2.5 mg/3 mL (DUONEB) 0.5-2.5 (3) MG/3ML neb solution Take 1 vial by nebulization 4 times daily And q4h PRN       LEVOTHYROXINE SODIUM PO 25 mcg by Gastric Tube route daily       sennosides (SENOKOT) 8.8 MG/5ML syrup Take 5 mLs by mouth every morning       valproic acid (DEPAKENE) 250 MG/5ML SOLN syrup 500 mg by Gastric Tube route 2 times daily          Medications reviewed:  Medications reconciled to facility chart and changes were made to reflect current medications as identified as above med list. Below are the changes that were made:   Medications stopped since last EPIC medication reconciliation:   Medications Discontinued During This Encounter   Medication Reason     fluticasone-salmeterol (ADVAIR) 500-50 MCG/DOSE diskus inhaler      docusate (COLACE) 50 MG/5ML liquid      Medications started since last [facility] medication reconciliation:  Orders Placed This Encounter   Medications     sennosides (SENOKOT) 8.8 MG/5ML syrup     Sig: Take 5 mLs by mouth every morning       Case Management:  I have reviewed the care plan and MDS and do agree with the plan.   Information reviewed:  Medications, vital signs, orders, and nursing notes.    ROS:  4 point ROS neg other than the symptoms noted above in the HPI.    PHYSICAL EXAM:  /81   Pulse 58   Temp 97.6  F (36.4  C)   SpO2 94%   Gen: standing up with his walker - he had been ambulating around  the unit; alert, cooperative and in no acute distress  Resp: breathing non labored  Ext: no LE edema  Neuro: CX II-XII  grossly in tact; ROM in all four extremities grossly in tact  Psych: alert and oriented to self and general sitaution; normal affect    Lab/Diagnostic data:  Reviewed as per Epic    ASSESSMENT/PLAN    1) COPD   No signs of exacerbation.   -- DuoNebs QID  And q4h PRN    2) HTN   SBPs 110s-130s  -- continues on atenolol 50 mg daily  -- follow BPs and adjust medications as needed    3) Dysphagia s/p PEG (9/14/18)   Underlying TBI. No recent pneumonias. Had a PEG tube exchange on 7/8/19 at [facility].  --  NPO   -- Isosource 1.5 bolus of 2 cans BID and 1 can at HS (for a total of 5 cans per day)  -- continues on 225 ml water QID after meals and at bedtime  -- nutrition following    Electronically signed by:  Em Rivera MD        Sincerely,        Em Rivera MD

## 2019-09-14 NOTE — PROGRESS NOTES
[place] [facility]  Nursing Home Regulatory Visit  September 13, 2019    Chief Complaint   Patient presents with     custodial Regulatory       HPI:    Gaudencio Avila is a 69 year old  (1950), who is being seen today for a federally mandated E/M visit at [facility]. Today's concerns are:    1) COPD -- No signs of exacerbation. Managed with DuoNebs  2) HTN -- SBPs 110s-130s overall on atenolol   3) Dysphagia s/p PEG (9/14/18) -- Underlying TBI. No recent pneumonias. He remains NPO on bolus tube feeds. Most recent Barium swallow in March of this year which demonstrated no cough response and aspiration with HTL, NTL and thin liquids.     ALLERGIES: Interferons    PAST MEDICAL HISTORY:   Past Medical History:   Diagnosis Date     Aspiration pneumonias      Colon adenoma 3/2013     COPD (chronic obstructive pulmonary disease) (H)      Depression, major      Dysphagia causing pulmonary aspiration with swallowing 10/27/2010     GERD (gastroesophageal reflux disease) 11/15/2010     HDL deficiency      Hepatitis C, chronic (H)     [facility], [facility] - treated, resolved      HTN (hypertension)      Hyperlipidemia      Moderate persistent asthma      Osteoma of ear canal      Pelvic fracture (H) 1973     Pneumonopathy due to inhalation of dust (H)      Traumatic brain injury (H) 1973       PAST SURGICAL HISTORY:   Past Surgical History:   Procedure Laterality Date     BIOPSY LIVER  2007     FRACTURE TX, ANKLE RT/LT       LAPAROTOMY EXPLORATORY  1973    splenic rupture, liver laceration       FAMILY HISTORY:   Family History   Problem Relation Age of Onset     Breast Cancer Mother      C.A.D. Mother      Hypertension Mother      Respiratory Brother         MEDARDO     Diabetes Brother      Cancer - colorectal No family hx of        SOCIAL HISTORY:   Lives in a SNF    MEDICATIONS:  Current Outpatient Medications   Medication Sig Dispense Refill     ACETAMINOPHEN  mg by Gastric Tube route 3 times  daily Also 650 per g tube daily as needed       atenolol (TENORMIN) 50 MG tablet 50 mg by Per G Tube route daily       atorvastatin (LIPITOR) 10 MG tablet 10 mg by Gastric Tube route daily       guaiFENesin (ROBITUSSIN) 20 mg/mL SOLN Take 20 mLs by mouth daily as needed for cough       ipratropium - albuterol 0.5 mg/2.5 mg/3 mL (DUONEB) 0.5-2.5 (3) MG/3ML neb solution Take 1 vial by nebulization 4 times daily And q4h PRN       LEVOTHYROXINE SODIUM PO 25 mcg by Gastric Tube route daily       sennosides (SENOKOT) 8.8 MG/5ML syrup Take 5 mLs by mouth every morning       valproic acid (DEPAKENE) 250 MG/5ML SOLN syrup 500 mg by Gastric Tube route 2 times daily          Medications reviewed:  Medications reconciled to facility chart and changes were made to reflect current medications as identified as above med list. Below are the changes that were made:   Medications stopped since last EPIC medication reconciliation:   Medications Discontinued During This Encounter   Medication Reason     fluticasone-salmeterol (ADVAIR) 500-50 MCG/DOSE diskus inhaler      docusate (COLACE) 50 MG/5ML liquid      Medications started since last [facility] medication reconciliation:  Orders Placed This Encounter   Medications     sennosides (SENOKOT) 8.8 MG/5ML syrup     Sig: Take 5 mLs by mouth every morning       Case Management:  I have reviewed the care plan and MDS and do agree with the plan.   Information reviewed:  Medications, vital signs, orders, and nursing notes.    ROS:  4 point ROS neg other than the symptoms noted above in the HPI.    PHYSICAL EXAM:  /81   Pulse 58   Temp 97.6  F (36.4  C)   SpO2 94%   Gen: standing up with his walker - he had been ambulating around  the unit; alert, cooperative and in no acute distress  Resp: breathing non labored  Ext: no LE edema  Neuro: CX II-XII grossly in tact; ROM in all four extremities grossly in tact  Psych: alert and oriented to self and general sitaution; normal  affect    Lab/Diagnostic data:  Reviewed as per Epic    ASSESSMENT/PLAN    1) COPD   No signs of exacerbation.   -- DuoNebs QID  And q4h PRN    2) HTN   SBPs 110s-130s  -- continues on atenolol 50 mg daily  -- follow BPs and adjust medications as needed    3) Dysphagia s/p PEG (9/14/18)   Underlying TBI. No recent pneumonias. Had a PEG tube exchange on 7/8/19 at [facility].  --  NPO   -- Isosource 1.5 bolus of 2 cans BID and 1 can at HS (for a total of 5 cans per day)  -- continues on 225 ml water QID after meals and at bedtime  -- nutrition following    Electronically signed by:  Em Rivera MD

## 2019-11-07 ENCOUNTER — NURSING HOME VISIT (OUTPATIENT)
Dept: GERIATRICS | Facility: CLINIC | Age: 69
End: 2019-11-07
Payer: COMMERCIAL

## 2019-11-07 VITALS
RESPIRATION RATE: 19 BRPM | OXYGEN SATURATION: 97 % | DIASTOLIC BLOOD PRESSURE: 82 MMHG | SYSTOLIC BLOOD PRESSURE: 122 MMHG | HEART RATE: 70 BPM | HEIGHT: 65 IN | TEMPERATURE: 97.6 F | WEIGHT: 147 LBS | BODY MASS INDEX: 24.49 KG/M2

## 2019-11-07 DIAGNOSIS — K59.00 CONSTIPATION, UNSPECIFIED CONSTIPATION TYPE: ICD-10-CM

## 2019-11-07 DIAGNOSIS — J44.9 CHRONIC OBSTRUCTIVE PULMONARY DISEASE, UNSPECIFIED COPD TYPE (H): ICD-10-CM

## 2019-11-07 DIAGNOSIS — I10 BENIGN ESSENTIAL HYPERTENSION: Primary | ICD-10-CM

## 2019-11-07 PROCEDURE — 99309 SBSQ NF CARE MODERATE MDM 30: CPT | Performed by: NURSE PRACTITIONER

## 2019-11-07 ASSESSMENT — MIFFLIN-ST. JEOR: SCORE: 1353.67

## 2019-11-07 NOTE — LETTER
11/7/2019        RE: Gaudencio Avila  [facility]  [address]  [place] [address]        [place] [facility]  Chief Complaint   Patient presents with     FPC Regulatory     [place] Medical Record Number:  [id number]  Place of Service where encounter took place:  [facility] ([facility]) [[id number]]    HPI:    Gaudencio Avila  is 69 year old (1950), who is being seen today for a federally mandated E/M visit.  HPI information obtained from: facility chart records, facility staff, patient report and [place] Epic chart review. Today's concerns are:    Hypertension. Upon review of blood pressure over the past 5 days, systolic range from 111-142. Diastolic range from 69-84.    Chronic Obstructive Pulmonary Disease. No reports of shortness of breath. Upon review of documentation, has not utilized Guaifenesin in at least 14 days or DuoNebs PRN in over 6 months.     Constipation. Upon review of documentation over the past 7 days, having bowel movements daily to every other day on average.    ALLERGIES:Interferons  PAST MEDICAL HISTORY:   has a past medical history of Aspiration pneumonias, Colon adenoma (3/2013), COPD (chronic obstructive pulmonary disease) (H), Depression, major, Dysphagia causing pulmonary aspiration with swallowing (10/27/2010), GERD (gastroesophageal reflux disease) (11/15/2010), HDL deficiency, Hepatitis C, chronic (H), HTN (hypertension), Hyperlipidemia, Moderate persistent asthma, Osteoma of ear canal, Pelvic fracture (H) (1973), Pneumonopathy due to inhalation of dust (H), and Traumatic brain injury (H) (1973).  PAST SURGICAL HISTORY:   has a past surgical history that includes fracture tx, ankle rt/lt; Laparotomy exploratory (1973); and Biopsy liver (2007).  FAMILY HISTORY: family history includes Breast Cancer in his mother; C.A.D. in his mother; Diabetes in his brother; Hypertension in his mother; Respiratory in his  "brother.  SOCIAL HISTORY:  reports that he quit smoking about 49 years ago. His smoking use included cigarettes. He has a 2.00 pack-year smoking history. He has never used smokeless tobacco. He reports that he does not drink alcohol or use drugs.    MEDICATIONS:  Current Outpatient Medications   Medication Sig Dispense Refill     ACETAMINOPHEN  mg by Gastric Tube route 3 times daily Also 650 per g tube daily as needed       atenolol (TENORMIN) 50 MG tablet 50 mg by Per G Tube route daily       atorvastatin (LIPITOR) 10 MG tablet 10 mg by Gastric Tube route daily       guaiFENesin (ROBITUSSIN) 20 mg/mL SOLN Take 20 mLs by mouth daily as needed for cough       ipratropium - albuterol 0.5 mg/2.5 mg/3 mL (DUONEB) 0.5-2.5 (3) MG/3ML neb solution Take 1 vial by nebulization 4 times daily And q4h PRN       LEVOTHYROXINE SODIUM PO 25 mcg by Gastric Tube route daily       sennosides (SENOKOT) 8.8 MG/5ML syrup Take 5 mLs by mouth every morning       valproic acid (DEPAKENE) 250 MG/5ML SOLN syrup 500 mg by Gastric Tube route 2 times daily        Case Management:  I have reviewed the care plan and MDS and do agree with the plan. Patient's desire to return to the community is not present. Information reviewed:  Medications, vital signs, orders, and nursing notes.    ROS:  4 point ROS including Respiratory, CV, GI and , other than that noted in the HPI,  is negative    Vitals:  /82   Pulse 70   Temp 97.6  F (36.4  C)   Resp 19   Ht 1.651 m (5' 5\")   Wt 66.7 kg (147 lb)   SpO2 97%   BMI 24.46 kg/m     Body mass index is 24.46 kg/m .  Exam:  GENERAL APPEARANCE:  Alert, in no distress  ENT:  Mouth and posterior oropharynx normal, moist mucous membranes  EYES:  EOM, conjunctivae, lids, pupils and irises normal  RESP:  respiratory effort and palpation of chest normal, lungs clear to auscultation , crackles in bilateral upper lobes  CV:  Palpation and auscultation of heart done , regular rate and rhythm, no murmur, " rub, or gallop  ABDOMEN:  normal bowel sounds, soft, nontender, no hepatosplenomegaly or other masses, PEG tube in place  M/S:   Active movement of bilateral upper and lower extremities.   SKIN:  Inspection of skin and subcutaneous tissue baseline, Palpation of skin and subcutaneous tissue baseline  NEURO:   Cranial nerves 2-12 are normal tested and grossly at patient's baseline  PSYCH:  oriented to person    Lab/Diagnostic data:   Labs done in SNF are in [facility]. Please refer to them using [employer] Everywhere.    ASSESSMENT/PLAN  Hypertension. Most blood pressures < 140/90. Monitor blood pressure weekly. Continue Atenolol as ordered.    Chronic Obstructive Pulmonary Disease. Crackles noted in lungs, but will be receiving DuoNebs. Continue DuoNebs as ordered. Will discontinue Guaifenesin due to non-use.     Constipation.Stable on Senna as ordered.    Electronically signed by:  SATINDER Parra CNP          Sincerely,        SATINDER Parra CNP

## 2019-11-07 NOTE — PROGRESS NOTES
[place] [facility]  Chief Complaint   Patient presents with     FDC Regulatory     [place] Medical Record Number:  [id number]  Place of Service where encounter took place:  [facility] ([facility]) [[id number]]    HPI:    Gaudencio Avila  is 69 year old (1950), who is being seen today for a federally mandated E/M visit.  HPI information obtained from: facility chart records, facility staff, patient report and [facility] chart review. Today's concerns are:    Hypertension. Upon review of blood pressure over the past 5 days, systolic range from 111-142. Diastolic range from 69-84.    Chronic Obstructive Pulmonary Disease. No reports of shortness of breath. Upon review of documentation, has not utilized Guaifenesin in at least 14 days or DuoNebs PRN in over 6 months.     Constipation. Upon review of documentation over the past 7 days, having bowel movements daily to every other day on average.    ALLERGIES:Interferons  PAST MEDICAL HISTORY:   has a past medical history of Aspiration pneumonias, Colon adenoma (3/2013), COPD (chronic obstructive pulmonary disease) (H), Depression, major, Dysphagia causing pulmonary aspiration with swallowing (10/27/2010), GERD (gastroesophageal reflux disease) (11/15/2010), HDL deficiency, Hepatitis C, chronic (H), HTN (hypertension), Hyperlipidemia, Moderate persistent asthma, Osteoma of ear canal, Pelvic fracture (H) (1973), Pneumonopathy due to inhalation of dust (H), and Traumatic brain injury (H) (1973).  PAST SURGICAL HISTORY:   has a past surgical history that includes fracture tx, ankle rt/lt; Laparotomy exploratory (1973); and Biopsy liver (2007).  FAMILY HISTORY: family history includes Breast Cancer in his mother; C.A.D. in his mother; Diabetes in his brother; Hypertension in his mother; Respiratory in his brother.  SOCIAL HISTORY:  reports that he quit smoking about 49 years ago. His smoking use included cigarettes. He has a 2.00  "pack-year smoking history. He has never used smokeless tobacco. He reports that he does not drink alcohol or use drugs.    MEDICATIONS:  Current Outpatient Medications   Medication Sig Dispense Refill     ACETAMINOPHEN  mg by Gastric Tube route 3 times daily Also 650 per g tube daily as needed       atenolol (TENORMIN) 50 MG tablet 50 mg by Per G Tube route daily       atorvastatin (LIPITOR) 10 MG tablet 10 mg by Gastric Tube route daily       guaiFENesin (ROBITUSSIN) 20 mg/mL SOLN Take 20 mLs by mouth daily as needed for cough       ipratropium - albuterol 0.5 mg/2.5 mg/3 mL (DUONEB) 0.5-2.5 (3) MG/3ML neb solution Take 1 vial by nebulization 4 times daily And q4h PRN       LEVOTHYROXINE SODIUM PO 25 mcg by Gastric Tube route daily       sennosides (SENOKOT) 8.8 MG/5ML syrup Take 5 mLs by mouth every morning       valproic acid (DEPAKENE) 250 MG/5ML SOLN syrup 500 mg by Gastric Tube route 2 times daily        Case Management:  I have reviewed the care plan and MDS and do agree with the plan. Patient's desire to return to the community is not present. Information reviewed:  Medications, vital signs, orders, and nursing notes.    ROS:  4 point ROS including Respiratory, CV, GI and , other than that noted in the HPI,  is negative    Vitals:  /82   Pulse 70   Temp 97.6  F (36.4  C)   Resp 19   Ht 1.651 m (5' 5\")   Wt 66.7 kg (147 lb)   SpO2 97%   BMI 24.46 kg/m    Body mass index is 24.46 kg/m .  Exam:  GENERAL APPEARANCE:  Alert, in no distress  ENT:  Mouth and posterior oropharynx normal, moist mucous membranes  EYES:  EOM, conjunctivae, lids, pupils and irises normal  RESP:  respiratory effort and palpation of chest normal, lungs clear to auscultation , crackles in bilateral upper lobes  CV:  Palpation and auscultation of heart done , regular rate and rhythm, no murmur, rub, or gallop  ABDOMEN:  normal bowel sounds, soft, nontender, no hepatosplenomegaly or other masses, PEG tube in place  M/S:  "  Active movement of bilateral upper and lower extremities.   SKIN:  Inspection of skin and subcutaneous tissue baseline, Palpation of skin and subcutaneous tissue baseline  NEURO:   Cranial nerves 2-12 are normal tested and grossly at patient's baseline  PSYCH:  oriented to person    Lab/Diagnostic data:   Labs done in SNF are in [place] EPIC. Please refer to them using [employer] Everywhere.    ASSESSMENT/PLAN  Hypertension. Most blood pressures < 140/90. Monitor blood pressure weekly. Continue Atenolol as ordered.    Chronic Obstructive Pulmonary Disease. Crackles noted in lungs, but will be receiving DuoNebs. Continue DuoNebs as ordered. Will discontinue Guaifenesin due to non-use.     Constipation.Stable on Senna as ordered.    Electronically signed by:  SATINDER Parra CNP

## 2019-11-14 ENCOUNTER — TELEPHONE (OUTPATIENT)
Dept: GERIATRICS | Facility: CLINIC | Age: 69
End: 2019-11-14

## 2019-11-15 NOTE — TELEPHONE ENCOUNTER
Feeding tube pulled out by pt onto floor--no bleeding or acute issues.  I asked nurse to put a lundberg in ,and update NP in the am as pt will need IR visit.

## 2019-12-31 ENCOUNTER — DISCHARGE SUMMARY NURSING HOME (OUTPATIENT)
Dept: GERIATRICS | Facility: CLINIC | Age: 69
End: 2019-12-31
Payer: COMMERCIAL

## 2019-12-31 VITALS
HEART RATE: 69 BPM | OXYGEN SATURATION: 95 % | DIASTOLIC BLOOD PRESSURE: 75 MMHG | TEMPERATURE: 97.9 F | SYSTOLIC BLOOD PRESSURE: 136 MMHG | RESPIRATION RATE: 18 BRPM

## 2019-12-31 DIAGNOSIS — D72.829 LEUKOCYTOSIS, UNSPECIFIED TYPE: ICD-10-CM

## 2019-12-31 DIAGNOSIS — K59.00 CONSTIPATION, UNSPECIFIED CONSTIPATION TYPE: ICD-10-CM

## 2019-12-31 DIAGNOSIS — E78.5 HYPERLIPIDEMIA, UNSPECIFIED HYPERLIPIDEMIA TYPE: ICD-10-CM

## 2019-12-31 DIAGNOSIS — E03.9 HYPOTHYROIDISM, UNSPECIFIED TYPE: ICD-10-CM

## 2019-12-31 DIAGNOSIS — J44.9 CHRONIC OBSTRUCTIVE PULMONARY DISEASE, UNSPECIFIED COPD TYPE (H): ICD-10-CM

## 2019-12-31 DIAGNOSIS — R13.19 DYSPHAGIA CAUSING PULMONARY ASPIRATION WITH SWALLOWING: ICD-10-CM

## 2019-12-31 DIAGNOSIS — S06.9X9S TRAUMATIC BRAIN INJURY WITH LOSS OF CONSCIOUSNESS, SEQUELA (H): Primary | ICD-10-CM

## 2019-12-31 DIAGNOSIS — Z87.81 HISTORY OF RIB FRACTURE: ICD-10-CM

## 2019-12-31 DIAGNOSIS — I10 BENIGN ESSENTIAL HYPERTENSION: ICD-10-CM

## 2019-12-31 PROCEDURE — 99316 NF DSCHRG MGMT 30 MIN+: CPT | Performed by: NURSE PRACTITIONER
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
## 2020-01-15 ENCOUNTER — NURSE TRIAGE (OUTPATIENT)
Dept: FAMILY MEDICINE | Facility: CLINIC | Age: 70
End: 2020-01-15

## 2020-01-15 NOTE — TELEPHONE ENCOUNTER
"(late entry) Actual call time was 10:53 am.     There is not protocol for aspiration or anything regarding any abdominal tube (G or J or G/J) so I utilized vomiting protocol.     Cristiane RN from [facility] was directed to call 911 to go to hospital for prompt evaluation. Concerns of aspiration, respiratory distress, and now elevated blood pressure and pulse, low grade fever.     Cristiane said family does not want ER but wants family practice. Triage does not believe this patient is a candidate for family practice. I met some resistence to this so as a short courtesy to caller I did huddle with Family Practice AND Urgent Care providers which were both fully in agreement that ER is the best place for patient due to lack of resources and equipment in the clinic. Cristiane also said family prefers to transport patient and not to call 911. Advised RN to tell family that it may be difficult to transport patient since reports of increased weakness. Also if patient declines such as respiratory distress/decline, there would be no one to help.     Cristiane said she would call family and advise 911.     Reason for Disposition    Sounds like a life-threatening emergency to the triager    Additional Information    Negative: Shock suspected (e.g., cold/pale/clammy skin, too weak to stand, low BP, rapid pulse)    Negative: Difficult to awaken or acting confused (e.g., disoriented, slurred speech)    Answer Assessment - Initial Assessment Questions  1. VOMITING SEVERITY: \"How many times have you vomited in the past 24 hours?\"      - MILD:  1 - 2 times/day     - MODERATE: 3 - 5 times/day, decreased oral intake without significant weight loss or symptoms of dehydration     - SEVERE: 6 or more times/day, vomits everything or nearly everything, with significant weight loss, symptoms of dehydration       No real vomiting, just coughing up tube feeding frequent cough which RN says is somewhat baseline for him due to his COPD.   2. ONSET: \"When did " "the vomiting begin?\"       RN reports this AM she came in to find him receiving an AM bolus and noticed he was not tolerating it. His abdomen was distended, he looked uncomfortable, there was tube feeding around the site of insertion on the abdomen, and he was coughing up tube feeding.   3. FLUIDS: \"What fluids or food have you vomited up today?\" \"Have you been able to keep any fluids down?\"      Patient is NPO and recieves 2 cans, 500 ml of Isosource 1.5. He received last evening's bolus she said without difficulty. This morning he was receiving the AM bolus and was not tolerating it.     4. ABDOMINAL PAIN: \"Are your having any abdominal pain?\" If yes : \"How bad is it and what does it feel like?\" (e.g., crampy, dull, intermittent, constant)       Per RN patient indicates he has no pain.     5. DIARRHEA: \"Is there any diarrhea?\" If so, ask: \"How many times today?\"       Per RN patient did get up to go to bathroom during the night. She could not say what he did Urine or Stool, or how many times he went. She said staff told her he was notably weaker than normal.     6. CONTACTS: \"Is there anyone else in the family with the same symptoms?\"       N/A  7. CAUSE: \"What do you think is causing your vomiting?\"      Not tolerating tube feedings. She did pull out 900 ml of tube feeding this AM which has proved to show visible improvement in patient's condition.     8. HYDRATION STATUS: \"Any signs of dehydration?\" (e.g., dry mouth [not only dry lips], too weak to stand) \"When did you last urinate?\"      Did not discuss   9. OTHER SYMPTOMS: \"Do you have any other symptoms?\" (e.g., fever, headache, vertigo, vomiting blood or coffee grounds, recent head injury)      Vitals at 6am: Temp 99.6 Pulse is 65 oxygen is 90 % blood pressure is 132/83   Vitals at time of call: Temp 98.4 Pulse 98 /93 and recheck 147/101 Oxygen 90 %   RN says lungs sound very wet. Frequent coughing which is productive, tube feeding consistency. She " "denies him looking distressed respiratory mendez. RN notes he has COPD baseline and coughs lots. He is notably weak and does not look well.     10. PREGNANCY: \"Is there any chance you are pregnant?\" \"When was your last menstrual period?\"        N/A    Protocols used: VOMITING-A-OH      "

## 2020-01-22 ENCOUNTER — OFFICE VISIT (OUTPATIENT)
Dept: FAMILY MEDICINE | Facility: CLINIC | Age: 70
End: 2020-01-22
Payer: COMMERCIAL

## 2020-01-22 ENCOUNTER — ANCILLARY PROCEDURE (OUTPATIENT)
Dept: GENERAL RADIOLOGY | Facility: CLINIC | Age: 70
End: 2020-01-22
Attending: INTERNAL MEDICINE
Payer: COMMERCIAL

## 2020-01-22 VITALS
OXYGEN SATURATION: 94 % | HEART RATE: 62 BPM | BODY MASS INDEX: 24.99 KG/M2 | DIASTOLIC BLOOD PRESSURE: 74 MMHG | TEMPERATURE: 97.2 F | WEIGHT: 150 LBS | SYSTOLIC BLOOD PRESSURE: 122 MMHG | HEIGHT: 65 IN

## 2020-01-22 DIAGNOSIS — J69.0 ASPIRATION PNEUMONITIS (H): ICD-10-CM

## 2020-01-22 DIAGNOSIS — Z93.1 S/P PERCUTANEOUS ENDOSCOPIC GASTROSTOMY (PEG) TUBE PLACEMENT (H): Primary | ICD-10-CM

## 2020-01-22 DIAGNOSIS — J44.9 CHRONIC OBSTRUCTIVE PULMONARY DISEASE, UNSPECIFIED COPD TYPE (H): ICD-10-CM

## 2020-01-22 DIAGNOSIS — G40.909 SEIZURE DISORDER (H): ICD-10-CM

## 2020-01-22 LAB
ALBUMIN SERPL-MCNC: 3.2 G/DL (ref 3.4–5)
ALP SERPL-CCNC: 66 U/L (ref 40–150)
ALT SERPL W P-5'-P-CCNC: 39 U/L (ref 0–70)
ANION GAP SERPL CALCULATED.3IONS-SCNC: 5 MMOL/L (ref 3–14)
AST SERPL W P-5'-P-CCNC: 23 U/L (ref 0–45)
BILIRUB SERPL-MCNC: 0.5 MG/DL (ref 0.2–1.3)
BUN SERPL-MCNC: 31 MG/DL (ref 7–30)
CALCIUM SERPL-MCNC: 9 MG/DL (ref 8.5–10.1)
CHLORIDE SERPL-SCNC: 104 MMOL/L (ref 94–109)
CO2 SERPL-SCNC: 27 MMOL/L (ref 20–32)
CREAT SERPL-MCNC: 0.45 MG/DL (ref 0.66–1.25)
DIFFERENTIAL METHOD BLD: ABNORMAL
EOSINOPHIL # BLD AUTO: 0.3 10E9/L (ref 0–0.7)
EOSINOPHIL NFR BLD AUTO: 2 %
ERYTHROCYTE [DISTWIDTH] IN BLOOD BY AUTOMATED COUNT: 13.6 % (ref 10–15)
GFR SERPL CREATININE-BSD FRML MDRD: >90 ML/MIN/{1.73_M2}
GLUCOSE SERPL-MCNC: 63 MG/DL (ref 70–99)
HCT VFR BLD AUTO: 42.9 % (ref 40–53)
HGB BLD-MCNC: 13.8 G/DL (ref 13.3–17.7)
LYMPHOCYTES # BLD AUTO: 4.9 10E9/L (ref 0.8–5.3)
LYMPHOCYTES NFR BLD AUTO: 38 %
MACROCYTES BLD QL SMEAR: PRESENT
MCH RBC QN AUTO: 32.4 PG (ref 26.5–33)
MCHC RBC AUTO-ENTMCNC: 32.2 G/DL (ref 31.5–36.5)
MCV RBC AUTO: 101 FL (ref 78–100)
MONOCYTES # BLD AUTO: 0.9 10E9/L (ref 0–1.3)
MONOCYTES NFR BLD AUTO: 7 %
NEUTROPHILS # BLD AUTO: 6.8 10E9/L (ref 1.6–8.3)
NEUTROPHILS NFR BLD AUTO: 53 %
PHOSPHATE SERPL-MCNC: 3.4 MG/DL (ref 2.5–4.5)
PLATELET # BLD AUTO: 246 10E9/L (ref 150–450)
PLATELET # BLD EST: ABNORMAL 10*3/UL
POTASSIUM SERPL-SCNC: 4.3 MMOL/L (ref 3.4–5.3)
PROT SERPL-MCNC: 7.6 G/DL (ref 6.8–8.8)
RBC # BLD AUTO: 4.26 10E12/L (ref 4.4–5.9)
SODIUM SERPL-SCNC: 136 MMOL/L (ref 133–144)
VALPROATE SERPL-MCNC: 73 MG/L (ref 50–100)
VARIANT LYMPHS BLD QL SMEAR: PRESENT
WBC # BLD AUTO: 12.9 10E9/L (ref 4–11)

## 2020-01-22 PROCEDURE — 99214 OFFICE O/P EST MOD 30 MIN: CPT | Performed by: INTERNAL MEDICINE

## 2020-01-22 PROCEDURE — 80053 COMPREHEN METABOLIC PANEL: CPT | Performed by: INTERNAL MEDICINE

## 2020-01-22 PROCEDURE — 71046 X-RAY EXAM CHEST 2 VIEWS: CPT

## 2020-01-22 PROCEDURE — 36415 COLL VENOUS BLD VENIPUNCTURE: CPT | Performed by: INTERNAL MEDICINE

## 2020-01-22 PROCEDURE — 84100 ASSAY OF PHOSPHORUS: CPT | Performed by: INTERNAL MEDICINE

## 2020-01-22 PROCEDURE — 85025 COMPLETE CBC W/AUTO DIFF WBC: CPT | Performed by: INTERNAL MEDICINE

## 2020-01-22 PROCEDURE — 80164 ASSAY DIPROPYLACETIC ACD TOT: CPT | Performed by: INTERNAL MEDICINE

## 2020-01-22 ASSESSMENT — PAIN SCALES - GENERAL: PAINLEVEL: NO PAIN (0)

## 2020-01-22 ASSESSMENT — MIFFLIN-ST. JEOR: SCORE: 1367.28

## 2020-01-22 NOTE — PROGRESS NOTES
Subjective     Gaudencio Avila is a 69 year old male who presents to clinic today for the following health issues:    HPI   New Patient/Transfer of Care      Hospital Follow-up Visit:    Hospital/Nursing Home/IP Rehab Facility: [facility] [place], [place]  Date of Admission: 1/15/2020  Date of Discharge: 1/19/2020  Reason(s) for Admission: small bowel obstruction            Problems taking medications regularly:  None       Medication changes since discharge: None       Problems adhering to non-medication therapy:  None    Summary of hospitalization:  CareEverywhere information obtained and reviewed  Diagnostic Tests/Treatments reviewed.  Follow up needed: Yes.  Other Healthcare Providers Involved in Patient s Care:         Homecare  Update since discharge: improved.     Post Discharge Medication Reconciliation: discharge medications reconciled, continue medications without change.  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)     Hypertension goal BP (blood pressure) < 150/90     Hyperlipidemia LDL goal <130     Traumatic brain injury (H)     Dysphagia causing pulmonary aspiration with swallowing     GERD (gastroesophageal reflux disease)     Advanced directives, counseling/discussion     Health Care Home     Osteoma of ear canal     Leukocytosis, unspecified type     Seizure disorder (H)     G tube feedings (H)     Past Surgical History:   Procedure Laterality Date     BIOPSY LIVER  2007     FRACTURE TX, ANKLE RT/LT       LAPAROTOMY EXPLORATORY  1973    splenic rupture, liver laceration       Social History     Tobacco Use     Smoking  status: Former Smoker     Packs/day: 0.50     Years: 4.00     Pack years: 2.00     Types: Cigarettes     Last attempt to quit: 1970     Years since quittin.1     Smokeless tobacco: Never Used   Substance Use Topics     Alcohol use: No     Family History   Problem Relation Age of Onset     Breast Cancer Mother      C.A.D. Mother      Hypertension Mother      Respiratory Brother         MEDARDO     Diabetes Brother      Cancer - colorectal No family hx of          Allergies   Allergen Reactions     Interferons      Recent Labs   Lab Test 20  1140 07/31/19 06/24/19 12/13/18  05/15/18  1221 17  1509 02/10/16  1304   LDL  --   --   --   --   --  125* 75 81   HDL  --   --   --   --   --  31* 28* 29*   TRIG  --   --   --   --   --  199* 134 120   ALT 39  --   --  17  --  22 78* 78*   CR 0.45* 0.73 0.54* 0.57*   < > 0.83 0.70 0.64*   GFRESTIMATED >90 >60 >60 >60   < > >90 >90  Non  GFR Calc   >90  Non  GFR Calc     GFRESTBLACK >90 >60 >60 >60   < > >90 >90   GFR Calc   >90   GFR Calc     POTASSIUM 4.3 4.6 4.6 4.5   < > 4.0 4.4 4.3   TSH  --   --  1.33 1.36  --  3.66 2.17 2.77    < > = values in this interval not displayed.      BP Readings from Last 3 Encounters:   20 122/74   19 136/75   19 122/82    Wt Readings from Last 3 Encounters:   20 68 kg (150 lb)   19 66.7 kg (147 lb)   05/15/19 74.5 kg (164 lb 3.2 oz)                      Reviewed and updated as needed this visit by Provider         Review of Systems   ROS COMP: CONSTITUTIONAL: NEGATIVE for fever, chills, change in weight  INTEGUMENTARY/SKIN: NEGATIVE for worrisome rashes, moles or lesions  EYES: NEGATIVE for vision changes or irritation  ENT/MOUTH: NEGATIVE for ear, mouth and throat problems  RESP: NEGATIVE for significant cough or SOB  CV: NEGATIVE for chest pain, palpitations or peripheral edema  GI: NEGATIVE for nausea, abdominal pain, heartburn, or  "change in bowel habits  : NEGATIVE for frequency, dysuria, or hematuria  MUSCULOSKELETAL: NEGATIVE for significant arthralgias or myalgia  NEURO: NEGATIVE for weakness, dizziness or paresthesias  ENDOCRINE: NEGATIVE for temperature intolerance, skin/hair changes  HEME: NEGATIVE for bleeding problems  PSYCHIATRIC: NEGATIVE for changes in mood or affect      Objective    /74 (BP Location: Left arm, Patient Position: Sitting, Cuff Size: Adult Regular)   Pulse 62   Temp 97.2  F (36.2  C) (Tympanic)   Ht 1.651 m (5' 5\")   Wt 68 kg (150 lb)   SpO2 94%   BMI 24.96 kg/m    Body mass index is 24.96 kg/m .  Physical Exam   GENERAL: healthy, alert and no distress  EYES: Eyes grossly normal to inspection, PERRL and conjunctivae and sclerae normal  HENT: ear canals and TM's normal, nose and mouth without ulcers or lesions  NECK: no adenopathy, no asymmetry, masses, or scars and thyroid normal to palpation  RESP: lungs clear to auscultation - no rales, rhonchi or wheezes  CV: regular rate and rhythm, normal S1 S2, no S3 or S4, no murmur, click or rub, no peripheral edema and peripheral pulses strong  ABDOMEN: soft, nontender, no hepatosplenomegaly, no masses and bowel sounds normal  MS: no gross musculoskeletal defects noted, no edema  SKIN: no suspicious lesions or rashes  NEURO: Normal strength and tone, mentation intact and speech normal  PSYCH: mentation appears normal, affect normal/bright    Diagnostic Test Results:  Results for orders placed or performed in visit on 01/22/20   XR Chest 2 Views     Status: None    Narrative    CHEST TWO VIEWS 1/22/2020 11:42 AM    CLINICAL HISTORY: Aspiration pneumonitis.    TECHNIQUE: Two view chest.    COMPARISON: 12/31/2015    FINDINGS: Heart size and pulmonary vascularity within normal limits.  Stable fibrotic changes right lung base. Strands of linear fibrosis or  atelectasis in the left lower lung. Previously seen extensive  infiltrates in the left lower lung have " resolved. No acute  infiltrates. Minimal elevation right hemidiaphragm. Hypertrophic  changes thoracic spine.    BARTOLO ALCALA MD   CBC with platelets and differential     Status: Abnormal   Result Value Ref Range    WBC 12.9 (H) 4.0 - 11.0 10e9/L    RBC Count 4.26 (L) 4.4 - 5.9 10e12/L    Hemoglobin 13.8 13.3 - 17.7 g/dL    Hematocrit 42.9 40.0 - 53.0 %     (H) 78 - 100 fl    MCH 32.4 26.5 - 33.0 pg    MCHC 32.2 31.5 - 36.5 g/dL    RDW 13.6 10.0 - 15.0 %    Platelet Count 246 150 - 450 10e9/L    % Neutrophils 53.0 %    % Lymphocytes 38.0 %    % Monocytes 7.0 %    % Eosinophils 2.0 %    Absolute Neutrophil 6.8 1.6 - 8.3 10e9/L    Absolute Lymphocytes 4.9 0.8 - 5.3 10e9/L    Absolute Monocytes 0.9 0.0 - 1.3 10e9/L    Absolute Eosinophils 0.3 0.0 - 0.7 10e9/L    Macrocytes Present     Reactive Lymphs Present     Platelet Estimate       Automated count confirmed.  Platelet morphology is normal.    Diff Method Automated Method    Comprehensive metabolic panel (BMP + Alb, Alk Phos, ALT, AST, Total. Bili, TP)     Status: Abnormal   Result Value Ref Range    Sodium 136 133 - 144 mmol/L    Potassium 4.3 3.4 - 5.3 mmol/L    Chloride 104 94 - 109 mmol/L    Carbon Dioxide 27 20 - 32 mmol/L    Anion Gap 5 3 - 14 mmol/L    Glucose 63 (L) 70 - 99 mg/dL    Urea Nitrogen 31 (H) 7 - 30 mg/dL    Creatinine 0.45 (L) 0.66 - 1.25 mg/dL    GFR Estimate >90 >60 mL/min/[1.73_m2]    GFR Estimate If Black >90 >60 mL/min/[1.73_m2]    Calcium 9.0 8.5 - 10.1 mg/dL    Bilirubin Total 0.5 0.2 - 1.3 mg/dL    Albumin 3.2 (L) 3.4 - 5.0 g/dL    Protein Total 7.6 6.8 - 8.8 g/dL    Alkaline Phosphatase 66 40 - 150 U/L    ALT 39 0 - 70 U/L    AST 23 0 - 45 U/L   Phosphorus     Status: None   Result Value Ref Range    Phosphorus 3.4 2.5 - 4.5 mg/dL   Valproic acid     Status: None   Result Value Ref Range    Valproic Acid Level 73 50 - 100 mg/L           Assessment & Plan     1. S/P percutaneous endoscopic gastrostomy (PEG) tube placement  (H)    - Comprehensive metabolic panel (BMP + Alb, Alk Phos, ALT, AST, Total. Bili, TP)  - Phosphorus  - order for DME; Equipment being ordered: PEG tube dressing. Change dressing daily or as needed.  Dispense: 100 each; Refill: 3    2. Chronic obstructive pulmonary disease, unspecified COPD type (H)    - CBC with platelets and differential    3. Seizure disorder (H)    - Valproic acid    4. Aspiration pneumonitis (H)    - XR Chest 2 Views       FUTURE APPOINTMENTS:       - Follow-up visit in 4 weeks.      Eric Mckinley MD  [facility]

## 2020-01-23 NOTE — NURSING NOTE
DME Rx faxed to [facility] in [place]  Phone [phone]  Fax [phone]    Facility will call patient.  Jean-Paul Sewell CMA

## 2020-01-27 ENCOUNTER — TELEPHONE (OUTPATIENT)
Dept: FAMILY MEDICINE | Facility: CLINIC | Age: 70
End: 2020-01-27

## 2020-01-27 NOTE — TELEPHONE ENCOUNTER
Notes recorded by Eric Mckinley MD on 1/26/2020 at 4:44 PM CST  Electrolytes, such as potassium, calcium and phosphorus, are normal. Keep PEG tube feedings at same settings (and composition).  Kidney and liver function are normal.  Low creatinine and albumin are low due to low body mass.   WBC count is abnormal. But patient is clinically better and chest x-ray is normal. No reason to restart antibiotics.  Valproic acid is normal. Continue Depakene at same dose.    (Call [facility], patient's home health nurse, at [phone] since patient is mentally challenged)    This writer attempted to contact [facility], group home RN and manager  on 01/27/20      Reason for call results, provider plan  and left message.      If group home RN calls back:   Registered Nurse called. Follow Triage Call workflow        Arina Martinez RN

## 2020-01-28 NOTE — TELEPHONE ENCOUNTER
This writer attempted to contact [facility] home care nurse on 01/28/20      Reason for call discuss results and left message.      If patient calls back:   Registered Nurse called. Follow Triage Call workflow        Karoline Agosto RN

## 2020-01-28 NOTE — TELEPHONE ENCOUNTER
Patient's nurse called back and was informed of the below per provider documentation. Chep verbalizes understanding and has no further questions at this time.     Amy Peguero RN

## 2020-01-28 NOTE — TELEPHONE ENCOUNTER
Chep returned call    Best number to reach caller: [phone]    Is it ok to leave a detailed message: yes

## 2020-01-31 DIAGNOSIS — I10 HYPERTENSION GOAL BP (BLOOD PRESSURE) < 150/90: ICD-10-CM

## 2020-01-31 DIAGNOSIS — K59.00 CONSTIPATION, UNSPECIFIED CONSTIPATION TYPE: ICD-10-CM

## 2020-01-31 DIAGNOSIS — J43.8 OTHER EMPHYSEMA (H): ICD-10-CM

## 2020-01-31 DIAGNOSIS — E78.5 HYPERLIPIDEMIA LDL GOAL <130: ICD-10-CM

## 2020-01-31 DIAGNOSIS — E03.9 ACQUIRED HYPOTHYROIDISM: ICD-10-CM

## 2020-01-31 DIAGNOSIS — G40.909 SEIZURE DISORDER (H): Primary | ICD-10-CM

## 2020-02-04 RX ORDER — ATENOLOL 50 MG/1
TABLET ORAL
Qty: 30 TABLET | Refills: 5 | Status: SHIPPED | OUTPATIENT
Start: 2020-02-04 | End: 2020-07-20

## 2020-02-04 RX ORDER — LEVOTHYROXINE SODIUM 25 UG/1
TABLET ORAL
Qty: 30 TABLET | Refills: 5 | Status: SHIPPED | OUTPATIENT
Start: 2020-02-04 | End: 2020-07-20

## 2020-02-04 RX ORDER — ATORVASTATIN CALCIUM 10 MG/1
TABLET, FILM COATED ORAL
Qty: 30 TABLET | Refills: 5 | Status: SHIPPED | OUTPATIENT
Start: 2020-02-04 | End: 2020-07-20

## 2020-02-04 RX ORDER — SENNOSIDES 8.8 MG/5ML
LIQUID ORAL
Qty: 150 ML | Refills: 11 | Status: SHIPPED | OUTPATIENT
Start: 2020-02-04 | End: 2021-01-04

## 2020-02-04 RX ORDER — IPRATROPIUM BROMIDE AND ALBUTEROL SULFATE 2.5; .5 MG/3ML; MG/3ML
SOLUTION RESPIRATORY (INHALATION)
Qty: 360 ML | Refills: 11 | Status: SHIPPED | OUTPATIENT
Start: 2020-02-04 | End: 2021-01-19

## 2020-02-04 RX ORDER — VALPROIC ACID 250 MG/5ML
SOLUTION ORAL
Qty: 600 ML | Refills: 11 | Status: SHIPPED | OUTPATIENT
Start: 2020-02-04 | End: 2021-02-03

## 2020-02-04 NOTE — TELEPHONE ENCOUNTER
..Reason for Call:  (5)prescriptions    Detailed comments: [facility] is checking status of medications requested/received the acetaminophen today but not the other (5),   Valproic acid, atenolol, sennosides, atorvastatin and ipratropium; they put medications in compliance packaging, Patient resides in a group home, needs to start these on Thurs 02-06, if they can be responded to by no later than tomorrow for delivery tomorrow evening; Thank you    *if possible delegate to another provider due to urgency    Phone Number Patient can be reached at: Other phone number:  [phone] [facility]    Best Time: anytime    Can we leave a detailed message on this number? Not Applicable    Call taken on 2/4/2020 at 3:08 PM by Loly Ruffin

## 2020-02-06 ENCOUNTER — OFFICE VISIT (OUTPATIENT)
Dept: FAMILY MEDICINE | Facility: CLINIC | Age: 70
End: 2020-02-06
Payer: COMMERCIAL

## 2020-02-06 VITALS
SYSTOLIC BLOOD PRESSURE: 138 MMHG | WEIGHT: 152.4 LBS | OXYGEN SATURATION: 96 % | HEART RATE: 67 BPM | BODY MASS INDEX: 25.36 KG/M2 | TEMPERATURE: 98.4 F | DIASTOLIC BLOOD PRESSURE: 82 MMHG

## 2020-02-06 DIAGNOSIS — Z48.02 ENCOUNTER FOR STAPLE REMOVAL: Primary | ICD-10-CM

## 2020-02-06 DIAGNOSIS — S01.01XD LACERATION OF SCALP, SUBSEQUENT ENCOUNTER: ICD-10-CM

## 2020-02-06 PROCEDURE — 99213 OFFICE O/P EST LOW 20 MIN: CPT | Performed by: FAMILY MEDICINE

## 2020-02-06 ASSESSMENT — PAIN SCALES - GENERAL: PAINLEVEL: NO PAIN (0)

## 2020-02-06 NOTE — PATIENT INSTRUCTIONS
At [facility], we strive to deliver an exceptional experience to you, every time we see you.  If you receive a survey in the mail, please send us back your thoughts. We really do value your feedback.    Based on your medical history, these are the current health maintenance/preventive care services that you are due for (some may have been done at this visit.)  Health Maintenance Due   Topic Date Due     COLONOSCOPY  03/21/2016     LIPID  05/15/2019     MEDICARE ANNUAL WELLNESS VISIT  06/05/2019     FALL RISK ASSESSMENT  06/05/2019     INFLUENZA VACCINE (1) 09/01/2019     PHQ-2  01/01/2020         Suggested websites for health information:  Www.[place].org : Up to date and easily searchable information on multiple topics.  Www.medlineplus.gov : medication info, interactive tutorials, watch real surgeries online  Www.familydoctor.org : good info from the Academy of Family Physicians  Www.cdc.gov : public health info, travel advisories, epidemics (H1N1)  Www.aap.org : children's health info, normal development, vaccinations  Www.health.[facility].[place].us : [place] dept of health, public health issues in [place], N1N1    Your care team:                            Family Medicine Internal Medicine   MD Eric Romero MD Shantel Branch-Fleming, MD Katya Georgiev PA-C Nam Ho, MD Pediatrics   JACQUES Reed, KAMLA Cortes APRMARLEY CNP   MD Hyacinth Ambrose MD Deborah Mielke, MD Kim Thein, APRN [facility]      Clinic hours: Monday - Thursday 7 am-7 pm; Fridays 7 am-5 pm.   Urgent care: Monday - Friday 11 am-9 pm; Saturday and Sunday 9 am-5 pm.  Pharmacy : Monday -Thursday 8 am-8 pm; Friday 8 am-6 pm; Saturday and Sunday 9 am-5 pm.     Clinic: [phone]   Pharmacy: [phone]
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
## 2020-02-24 ENCOUNTER — OFFICE VISIT (OUTPATIENT)
Dept: FAMILY MEDICINE | Facility: CLINIC | Age: 70
End: 2020-02-24
Payer: COMMERCIAL

## 2020-02-24 ENCOUNTER — ANCILLARY PROCEDURE (OUTPATIENT)
Dept: GENERAL RADIOLOGY | Facility: CLINIC | Age: 70
End: 2020-02-24
Attending: NURSE PRACTITIONER
Payer: COMMERCIAL

## 2020-02-24 VITALS
WEIGHT: 149.8 LBS | DIASTOLIC BLOOD PRESSURE: 66 MMHG | HEIGHT: 65 IN | BODY MASS INDEX: 24.96 KG/M2 | HEART RATE: 79 BPM | TEMPERATURE: 99.1 F | OXYGEN SATURATION: 93 % | SYSTOLIC BLOOD PRESSURE: 115 MMHG

## 2020-02-24 DIAGNOSIS — Z87.01 HISTORY OF ASPIRATION PNEUMONIA: ICD-10-CM

## 2020-02-24 DIAGNOSIS — R05.9 COUGH: ICD-10-CM

## 2020-02-24 DIAGNOSIS — J43.8 OTHER EMPHYSEMA (H): ICD-10-CM

## 2020-02-24 DIAGNOSIS — D75.839 THROMBOCYTOSIS: ICD-10-CM

## 2020-02-24 DIAGNOSIS — R13.19 DYSPHAGIA CAUSING PULMONARY ASPIRATION WITH SWALLOWING: ICD-10-CM

## 2020-02-24 DIAGNOSIS — R05.9 COUGH: Primary | ICD-10-CM

## 2020-02-24 DIAGNOSIS — E78.5 HYPERLIPIDEMIA LDL GOAL <130: ICD-10-CM

## 2020-02-24 DIAGNOSIS — S06.9X9S TRAUMATIC BRAIN INJURY WITH LOSS OF CONSCIOUSNESS, SEQUELA (H): ICD-10-CM

## 2020-02-24 LAB
ALBUMIN UR-MCNC: ABNORMAL MG/DL
ANION GAP SERPL CALCULATED.3IONS-SCNC: 9 MMOL/L (ref 3–14)
APPEARANCE UR: CLEAR
BACTERIA #/AREA URNS HPF: ABNORMAL /HPF
BASOPHILS # BLD AUTO: 0 10E9/L (ref 0–0.2)
BASOPHILS NFR BLD AUTO: 0.3 %
BILIRUB UR QL STRIP: NEGATIVE
BUN SERPL-MCNC: 29 MG/DL (ref 7–30)
CALCIUM SERPL-MCNC: 9.4 MG/DL (ref 8.5–10.1)
CHLORIDE SERPL-SCNC: 99 MMOL/L (ref 94–109)
CHOLEST SERPL-MCNC: 95 MG/DL
CO2 SERPL-SCNC: 26 MMOL/L (ref 20–32)
COLOR UR AUTO: YELLOW
CREAT SERPL-MCNC: 0.59 MG/DL (ref 0.66–1.25)
DIFFERENTIAL METHOD BLD: ABNORMAL
EOSINOPHIL # BLD AUTO: 0.1 10E9/L (ref 0–0.7)
EOSINOPHIL NFR BLD AUTO: 0.6 %
ERYTHROCYTE [DISTWIDTH] IN BLOOD BY AUTOMATED COUNT: 14.1 % (ref 10–15)
FLUAV+FLUBV AG SPEC QL: NEGATIVE
FLUAV+FLUBV AG SPEC QL: NEGATIVE
GFR SERPL CREATININE-BSD FRML MDRD: >90 ML/MIN/{1.73_M2}
GLUCOSE SERPL-MCNC: 82 MG/DL (ref 70–99)
GLUCOSE UR STRIP-MCNC: NEGATIVE MG/DL
HCT VFR BLD AUTO: 43.2 % (ref 40–53)
HDLC SERPL-MCNC: 48 MG/DL
HGB BLD-MCNC: 14.1 G/DL (ref 13.3–17.7)
HGB UR QL STRIP: NEGATIVE
KETONES UR STRIP-MCNC: NEGATIVE MG/DL
LDLC SERPL CALC-MCNC: 40 MG/DL
LEUKOCYTE ESTERASE UR QL STRIP: NEGATIVE
LYMPHOCYTES # BLD AUTO: 0.6 10E9/L (ref 0.8–5.3)
LYMPHOCYTES NFR BLD AUTO: 6.7 %
MCH RBC QN AUTO: 32.3 PG (ref 26.5–33)
MCHC RBC AUTO-ENTMCNC: 32.6 G/DL (ref 31.5–36.5)
MCV RBC AUTO: 99 FL (ref 78–100)
MONOCYTES # BLD AUTO: 1.2 10E9/L (ref 0–1.3)
MONOCYTES NFR BLD AUTO: 13.9 %
MUCOUS THREADS #/AREA URNS LPF: PRESENT /LPF
NEUTROPHILS # BLD AUTO: 6.9 10E9/L (ref 1.6–8.3)
NEUTROPHILS NFR BLD AUTO: 78.5 %
NITRATE UR QL: NEGATIVE
NONHDLC SERPL-MCNC: 47 MG/DL
PH UR STRIP: 6 PH (ref 5–7)
PLATELET # BLD AUTO: 199 10E9/L (ref 150–450)
POTASSIUM SERPL-SCNC: 4 MMOL/L (ref 3.4–5.3)
RBC # BLD AUTO: 4.36 10E12/L (ref 4.4–5.9)
RBC #/AREA URNS AUTO: ABNORMAL /HPF
SODIUM SERPL-SCNC: 134 MMOL/L (ref 133–144)
SOURCE: ABNORMAL
SP GR UR STRIP: 1.01 (ref 1–1.03)
SPECIMEN SOURCE: NORMAL
TRIGL SERPL-MCNC: 37 MG/DL
UROBILINOGEN UR STRIP-ACNC: 0.2 EU/DL (ref 0.2–1)
WBC # BLD AUTO: 8.8 10E9/L (ref 4–11)
WBC #/AREA URNS AUTO: ABNORMAL /HPF

## 2020-02-24 PROCEDURE — 81001 URINALYSIS AUTO W/SCOPE: CPT | Performed by: NURSE PRACTITIONER

## 2020-02-24 PROCEDURE — 71046 X-RAY EXAM CHEST 2 VIEWS: CPT

## 2020-02-24 PROCEDURE — 99214 OFFICE O/P EST MOD 30 MIN: CPT | Performed by: NURSE PRACTITIONER

## 2020-02-24 PROCEDURE — 80048 BASIC METABOLIC PNL TOTAL CA: CPT | Performed by: NURSE PRACTITIONER

## 2020-02-24 PROCEDURE — 80061 LIPID PANEL: CPT | Performed by: NURSE PRACTITIONER

## 2020-02-24 PROCEDURE — 87804 INFLUENZA ASSAY W/OPTIC: CPT | Performed by: NURSE PRACTITIONER

## 2020-02-24 PROCEDURE — 36415 COLL VENOUS BLD VENIPUNCTURE: CPT | Performed by: NURSE PRACTITIONER

## 2020-02-24 PROCEDURE — 85025 COMPLETE CBC W/AUTO DIFF WBC: CPT | Performed by: NURSE PRACTITIONER

## 2020-02-24 RX ORDER — PREDNISONE 20 MG/1
20 TABLET ORAL 2 TIMES DAILY
Qty: 10 TABLET | Refills: 0 | Status: SHIPPED | OUTPATIENT
Start: 2020-02-24 | End: 2020-03-10

## 2020-02-24 RX ORDER — DOXYCYCLINE 100 MG/1
100 CAPSULE ORAL 2 TIMES DAILY
Qty: 20 CAPSULE | Refills: 0 | Status: SHIPPED | OUTPATIENT
Start: 2020-02-24 | End: 2020-03-10

## 2020-02-24 ASSESSMENT — MIFFLIN-ST. JEOR: SCORE: 1366.37

## 2020-02-24 ASSESSMENT — PAIN SCALES - GENERAL: PAINLEVEL: NO PAIN (0)

## 2020-02-24 NOTE — PROGRESS NOTES
Subjective     Gaudencio Avila is a 69 year old male who presents to clinic today for the following health issues:    HPI   ENT Symptoms             Symptoms: cc Present Absent Comment   Fever/Chills  x  99.9 this morning   Fatigue  x     Muscle Aches   x    Eye Irritation   x    Sneezing   x    Nasal Xu/Drg   x    Sinus Pressure/Pain   x    Loss of smell   x    Dental pain   x    Sore Throat   x    Swollen Glands   x    Ear Pain/Fullness   x    Cough  x     Wheeze  x     Chest Pain   x    Shortness of breath   x    Rash   x    Other         Symptom duration:  yesrerday   Symptom severity:  0/10   Treatments tried:  no    Contacts:  none      9 am tylenol (5 hours ago)  Low appetite    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)     Hypertension goal BP (blood pressure) < 150/90     Hyperlipidemia LDL goal <130     Traumatic brain injury (H)     Dysphagia causing pulmonary aspiration with swallowing     GERD (gastroesophageal reflux disease)     Advanced directives, counseling/discussion     Health Care Home     Osteoma of ear canal     Leukocytosis, unspecified type     Seizure disorder (H)     G tube feedings (H)     Thrombocytosis (H)     Past Surgical History:   Procedure Laterality Date     BIOPSY LIVER       FRACTURE TX, ANKLE RT/LT       LAPAROTOMY EXPLORATORY      splenic rupture, liver laceration       Social History     Tobacco Use     Smoking status: Former Smoker     Packs/day: 0.50     Years: 4.00     Pack years: 2.00     Types: Cigarettes     Last attempt to quit: 1970     Years since quittin.1     Smokeless tobacco: Never Used   Substance Use Topics     Alcohol use: No     Family History   Problem Relation Age of Onset     Breast Cancer Mother      C.A.D. Mother      Hypertension Mother      Respiratory Brother         MEDARDO     Diabetes Brother      Cancer - colorectal No family  hx of          Current Outpatient Medications   Medication Sig Dispense Refill     acetaminophen (TYLENOL) 325 MG tablet GIVE 2 TABLETS PER PEG TUBE THREE TIMES A DAY FOR PAIN 180 tablet 5     ACETAMINOPHEN  mg by Gastric Tube route 3 times daily Also 650 per g tube daily as needed       atenolol (TENORMIN) 50 MG tablet TAKE 1 TABLET PER PEG TUBE ONCE EVERY MORNING 30 tablet 5     atorvastatin (LIPITOR) 10 MG tablet TAKE 1 TABLET PER PEG TUBE AT BEDTIME 30 tablet 5     doxycycline hyclate (VIBRAMYCIN) 100 MG capsule Take 1 capsule (100 mg) by mouth 2 times daily for 10 days 20 capsule 0     ipratropium - albuterol 0.5 mg/2.5 mg/3 mL (DUONEB) 0.5-2.5 (3) MG/3ML neb solution GIVE ONE VIAL THROUGH NEBULIZER FOUR TIMES A DAY FOR COPD 360 mL 11     levothyroxine (SYNTHROID/LEVOTHROID) 25 MCG tablet TAKE 1 TABLET PER PEG TUBE EVERY MORNING 30 tablet 5     LEVOTHYROXINE SODIUM PO 25 mcg by Gastric Tube route daily       order for DME Equipment being ordered: PEG tube dressing. Change dressing daily or as needed. 100 each 3     predniSONE (DELTASONE) 20 MG tablet Take 1 tablet (20 mg) by mouth 2 times daily for 5 days 10 tablet 0     Sennosides (SENNA) 8.8 MG/5ML LIQD GIVE 5 ML PER PEG TUBE EVERY MORNING FOR CONSTIPATION 150 mL 11     Valproate Sodium (VALPROIC ACID) 250 MG/5ML GIVE 10 ML PER PEG TUBE TWICE A  mL 11     Allergies   Allergen Reactions     Interferons          Reviewed and updated as needed this visit by Provider         Review of Systems   ROS COMP: Constitutional, HEENT, cardiovascular, pulmonary, GI, , musculoskeletal, neuro, skin, endocrine and psych systems are negative, except as otherwise noted.      Objective    /66 (BP Location: Left arm, Patient Position: Chair, Cuff Size: Adult Regular)   Pulse 79   Temp 99.1  F (37.3  C) (Oral)   Wt 67.9 kg (149 lb 12.8 oz)   BMI 24.93 kg/m    Body mass index is 24.93 kg/m .  Physical Exam   GENERAL: healthy, alert and no distress  EYES:  Eyes grossly normal to inspection, PERRL and conjunctivae and sclerae normal  HENT: normal cephalic/atraumatic, ear canals and TM's normal, nose and mouth without ulcers or lesions, nasal mucosa edematous , rhinorrhea clear, oropharynx clear, oral mucous membranes moist and tonsillar erythema  NECK: no adenopathy, no asymmetry, masses, or scars and thyroid normal to palpation  RESP: rhonchi bibasilar and expiratory wheezes bibasilar, short of breat with talking (per caregiver, this is a little worse than his baseline)  CV: regular rate and rhythm, normal S1 S2, no S3 or S4, no murmur, click or rub, no peripheral edema and peripheral pulses strong  ABDOMEN: soft, nontender, no hepatosplenomegaly, no masses and bowel sounds normal  MS: uses walker  PSYCH: mentation appears normal, affect normal/bright and judgement and insight impaired    Diagnostic Test Results:  Labs reviewed in Epic  Results for orders placed or performed in visit on 02/24/20 (from the past 24 hour(s))   Influenza A/B antigen   Result Value Ref Range    Influenza A/B Agn Specimen Nasal     Influenza A Negative NEG^Negative    Influenza B Negative NEG^Negative   CBC with platelets and differential   Result Value Ref Range    WBC 8.8 4.0 - 11.0 10e9/L    RBC Count 4.36 (L) 4.4 - 5.9 10e12/L    Hemoglobin 14.1 13.3 - 17.7 g/dL    Hematocrit 43.2 40.0 - 53.0 %    MCV 99 78 - 100 fl    MCH 32.3 26.5 - 33.0 pg    MCHC 32.6 31.5 - 36.5 g/dL    RDW 14.1 10.0 - 15.0 %    Platelet Count 199 150 - 450 10e9/L    % Neutrophils 78.5 %    % Lymphocytes 6.7 %    % Monocytes 13.9 %    % Eosinophils 0.6 %    % Basophils 0.3 %    Absolute Neutrophil 6.9 1.6 - 8.3 10e9/L    Absolute Lymphocytes 0.6 (L) 0.8 - 5.3 10e9/L    Absolute Monocytes 1.2 0.0 - 1.3 10e9/L    Absolute Eosinophils 0.1 0.0 - 0.7 10e9/L    Absolute Basophils 0.0 0.0 - 0.2 10e9/L    Diff Method Automated Method    *UA reflex to Microscopic and Culture ([place] and [facility] (except [place]  and [place])   Result Value Ref Range    Color Urine Yellow     Appearance Urine Clear     Glucose Urine Negative NEG^Negative mg/dL    Bilirubin Urine Negative NEG^Negative    Ketones Urine Negative NEG^Negative mg/dL    Specific Gravity Urine 1.015 1.003 - 1.035    Blood Urine Negative NEG^Negative    pH Urine 6.0 5.0 - 7.0 pH    Protein Albumin Urine Trace (A) NEG^Negative mg/dL    Urobilinogen Urine 0.2 0.2 - 1.0 EU/dL    Nitrite Urine Negative NEG^Negative    Leukocyte Esterase Urine Negative NEG^Negative    Source Midstream Urine        CHEST TWO VIEWS 2/24/2020 2:30 PM      HISTORY: Cough. History of aspiration pneumonia. Other emphysema (H).     COMPARISON: 1/22/2020     FINDINGS: Coarsened interstitial markings are similar to prior study.  No lobar consolidation, pneumothorax, or pleural effusion.                                                                       IMPRESSION: No radiographic evidence of acute chest abnormality.      JASON PHAM MD    Assessment & Plan     1. Cough  Xray normal.  White count normal.  Will treat as bronchitis.  Follow-up in 3 days to ensure he is improving.  - Influenza A/B antigen  - XR Chest 2 Views; Future  - CBC with platelets and differential  - Basic metabolic panel  (Ca, Cl, CO2, Creat, Gluc, K, Na, BUN)  - *UA reflex to Microscopic and Culture ([place] and [facility] (except [place] and [place])  - doxycycline hyclate (VIBRAMYCIN) 100 MG capsule; Take 1 capsule (100 mg) by mouth 2 times daily for 10 days  Dispense: 20 capsule; Refill: 0  - predniSONE (DELTASONE) 20 MG tablet; Take 1 tablet (20 mg) by mouth 2 times daily for 5 days  Dispense: 10 tablet; Refill: 0  - Urine Microscopic    2. History of aspiration pneumonia  In January 2020.   - XR Chest 2 Views; Future  - CBC with platelets and differential  - Basic metabolic panel  (Ca, Cl, CO2, Creat, Gluc, K, Na, BUN)  - *UA reflex to Microscopic and Culture ([facility] (except [place])  - doxycycline hyclate (VIBRAMYCIN) 100 MG capsule; Take 1 capsule (100 mg) by mouth 2 times daily for 10 days  Dispense: 20 capsule; Refill: 0    3. Thrombocytosis (H)  Resolved.   - CBC with platelets and differential    4. Traumatic brain injury with loss of consciousness, sequela (H)  Lives in a group home.     5. Dysphagia causing pulmonary aspiration with swallowing  Following diet.     6. Other emphysema (H)  Would likely benefit from controller inhalers. Advised discussing at follow up visit   - XR Chest 2 Views; Future    7. Hyperlipidemia LDL goal <130    - Lipid panel reflex to direct LDL Non-fasting       Patient Instructions   We will treat for bronchitis.    Start doxycycline twice a day for 10 days (antibiotic)  Start Prednisone twice a day for 5 days (to help with wheezing)  Use his nebulizer (duoneb) every 4 hours for the next 48 hours then as needed for wheezing, shortness of breath, or cough.      Return in about 3 days (around 2/27/2020) for Follow Up.    SATINDER Mcgrath [facility]

## 2020-02-26 ENCOUNTER — TELEPHONE (OUTPATIENT)
Dept: FAMILY MEDICINE | Facility: CLINIC | Age: 70
End: 2020-02-26

## 2020-02-26 NOTE — TELEPHONE ENCOUNTER
----- Message from SATINDER Martin CNP sent at 2/25/2020  8:12 AM CST -----  Please call patient to discuss lab results.  Everything did come back normal including his cholesterol screening.  Please continue the plan we made in the visit yesterday and follow up as planned on Friday. Thanks!  Bere

## 2020-03-10 ENCOUNTER — OFFICE VISIT (OUTPATIENT)
Dept: FAMILY MEDICINE | Facility: CLINIC | Age: 70
End: 2020-03-10
Payer: COMMERCIAL

## 2020-03-10 ENCOUNTER — ANCILLARY PROCEDURE (OUTPATIENT)
Dept: GENERAL RADIOLOGY | Facility: CLINIC | Age: 70
End: 2020-03-10
Attending: INTERNAL MEDICINE
Payer: COMMERCIAL

## 2020-03-10 VITALS
DIASTOLIC BLOOD PRESSURE: 85 MMHG | TEMPERATURE: 98 F | BODY MASS INDEX: 26.04 KG/M2 | OXYGEN SATURATION: 93 % | WEIGHT: 156.3 LBS | SYSTOLIC BLOOD PRESSURE: 137 MMHG | HEIGHT: 65 IN | HEART RATE: 76 BPM

## 2020-03-10 DIAGNOSIS — Z93.1 S/P PERCUTANEOUS ENDOSCOPIC GASTROSTOMY (PEG) TUBE PLACEMENT (H): ICD-10-CM

## 2020-03-10 DIAGNOSIS — G47.01 INSOMNIA DUE TO MEDICAL CONDITION: ICD-10-CM

## 2020-03-10 DIAGNOSIS — R13.12 OROPHARYNGEAL DYSPHAGIA: Primary | ICD-10-CM

## 2020-03-10 DIAGNOSIS — J69.0 ASPIRATION PNEUMONITIS (H): ICD-10-CM

## 2020-03-10 DIAGNOSIS — Z87.820 HISTORY OF TRAUMATIC BRAIN INJURY: ICD-10-CM

## 2020-03-10 PROCEDURE — 99214 OFFICE O/P EST MOD 30 MIN: CPT | Performed by: INTERNAL MEDICINE

## 2020-03-10 PROCEDURE — 74019 RADEX ABDOMEN 2 VIEWS: CPT

## 2020-03-10 PROCEDURE — 71046 X-RAY EXAM CHEST 2 VIEWS: CPT

## 2020-03-10 RX ORDER — NORTRIPTYLINE HYDROCHLORIDE 10 MG/5ML
10 SOLUTION ORAL AT BEDTIME
Qty: 473 ML | Refills: 2 | Status: SHIPPED | OUTPATIENT
Start: 2020-03-10 | End: 2020-11-06

## 2020-03-10 RX ORDER — AMOXICILLIN AND CLAVULANATE POTASSIUM 400; 57 MG/5ML; MG/5ML
800 POWDER, FOR SUSPENSION ORAL 2 TIMES DAILY
Qty: 200 ML | Refills: 1 | Status: SHIPPED | OUTPATIENT
Start: 2020-03-10 | End: 2020-03-11

## 2020-03-10 ASSESSMENT — MIFFLIN-ST. JEOR: SCORE: 1395.85

## 2020-03-10 ASSESSMENT — PAIN SCALES - GENERAL: PAINLEVEL: NO PAIN (0)

## 2020-03-10 NOTE — PROGRESS NOTES
Subjective     Gaudencio Avila is a 69 year old male who presents to clinic today for the following health issues:    HPI   ENT Symptoms             Symptoms: cc Present Absent Comment   Fever/Chills  x     Fatigue   x    Muscle Aches   x    Eye Irritation   x    Sneezing  x     Nasal Xu/Drg  x     Sinus Pressure/Pain   x    Loss of smell   x    Dental pain   x    Sore Throat   x    Swollen Glands   x    Ear Pain/Fullness   x    Cough  x     Wheeze  x     Chest Pain   x    Shortness of breath  x     Rash   x    Other   x      Symptom duration:  1 month    Symptom severity:  none    Treatments tried:  none    Contacts:  no           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)     Hypertension goal BP (blood pressure) < 150/90     Hyperlipidemia LDL goal <130     Traumatic brain injury (H)     Dysphagia causing pulmonary aspiration with swallowing     GERD (gastroesophageal reflux disease)     Advanced directives, counseling/discussion     Health Care Home     Osteoma of ear canal     Leukocytosis, unspecified type     Seizure disorder (H)     G tube feedings (H)     Past Surgical History:   Procedure Laterality Date     BIOPSY LIVER       FRACTURE TX, ANKLE RT/LT       LAPAROTOMY EXPLORATORY      splenic rupture, liver laceration       Social History     Tobacco Use     Smoking status: Former Smoker     Packs/day: 0.50     Years: 4.00     Pack years: 2.00     Types: Cigarettes     Last attempt to quit: 1970     Years since quittin.2     Smokeless tobacco: Never Used   Substance Use Topics     Alcohol use: No     Family History   Problem Relation Age of Onset     Breast Cancer Mother      C.A.D. Mother      Hypertension Mother      Respiratory Brother         MEDARDO     Diabetes Brother      Cancer - colorectal No family hx of          Allergies   Allergen Reactions     Interferons      Recent Labs  "  Lab Test 02/24/20  1428 01/22/20  1140  06/24/19 12/13/18  05/15/18  1221 03/02/17  1509   LDL 40  --   --   --   --   --  125* 75   HDL 48  --   --   --   --   --  31* 28*   TRIG 37  --   --   --   --   --  199* 134   ALT  --  39  --   --  17  --  22 78*   CR 0.59* 0.45*   < > 0.54* 0.57*   < > 0.83 0.70   GFRESTIMATED >90 >90   < > >60 >60   < > >90 >90  Non African American GFR Calc     GFRESTBLACK >90 >90   < > >60 >60   < > >90 >90  African American GFR Calc     POTASSIUM 4.0 4.3   < > 4.6 4.5   < > 4.0 4.4   TSH  --   --   --  1.33 1.36  --  3.66 2.17    < > = values in this interval not displayed.      BP Readings from Last 3 Encounters:   03/10/20 137/85   02/24/20 115/66   02/06/20 138/82    Wt Readings from Last 3 Encounters:   03/10/20 70.9 kg (156 lb 4.8 oz)   02/24/20 67.9 kg (149 lb 12.8 oz)   02/06/20 69.1 kg (152 lb 6.4 oz)            Reviewed and updated as needed this visit by Provider         Review of Systems   ROS COMP: CONSTITUTIONAL: NEGATIVE for fever, chills, change in weight  INTEGUMENTARY/SKIN: NEGATIVE for worrisome rashes, moles or lesions  EYES: NEGATIVE for vision changes or irritation  ENT/MOUTH: NEGATIVE for ear, mouth and throat problems  RESP: NEGATIVE for significant cough or SOB  CV: NEGATIVE for chest pain, palpitations or peripheral edema  GI: NEGATIVE for nausea, abdominal pain, heartburn, or change in bowel habits  : NEGATIVE for frequency, dysuria, or hematuria  MUSCULOSKELETAL: NEGATIVE for significant arthralgias or myalgia  NEURO: NEGATIVE for weakness, dizziness or paresthesias  ENDOCRINE: NEGATIVE for temperature intolerance, skin/hair changes  HEME: NEGATIVE for bleeding problems  PSYCHIATRIC: NEGATIVE for changes in mood or affect      Objective    /85 (BP Location: Left arm, Patient Position: Chair, Cuff Size: Adult Regular)   Pulse 76   Temp 98  F (36.7  C) (Oral)   Ht 1.651 m (5' 5\")   Wt 70.9 kg (156 lb 4.8 oz)   SpO2 93%   BMI 26.01 kg/m    Body " mass index is 26.01 kg/m .  Physical Exam   GENERAL: healthy, alert and no distress  EYES: Eyes grossly normal to inspection, PERRL and conjunctivae and sclerae normal  HENT: ear canals and TM's normal, nose and mouth without ulcers or lesions  NECK: no adenopathy, no asymmetry, masses, or scars and thyroid normal to palpation  RESP: lungs clear to auscultation - no rales, rhonchi or wheezes  CV: regular rate and rhythm, normal S1 S2, no S3 or S4, no murmur, click or rub, no peripheral edema and peripheral pulses strong  ABDOMEN: soft, nontender, no hepatosplenomegaly, no masses and bowel sounds normal  MS: no gross musculoskeletal defects noted, no edema  SKIN: no suspicious lesions or rashes  NEURO: Normal strength and tone, mentation intact and speech normal  PSYCH: mentation appears normal, affect normal/bright    Diagnostic Test Results:  Results for orders placed or performed in visit on 03/10/20   XR KUB     Status: None    Narrative    ABDOMEN ONE VIEW  3/10/2020 7:56 PM    HISTORY: S/P percutaneous endoscopic gastrostomy (PEG) tube placement  (H)    COMPARISON: Chest CT 12/15/2015      Impression    IMPRESSION: Although not significantly distended, the percutaneous  gastrostomy tube is overlying the spectral location of the stomach.  Normal bowel gas pattern. No significant stool burden. 5 mm  radiodensity in the right upper abdomen likely corresponds to a  calcified hepatic granuloma better seen on the comparison chest CT. No  definite radiopaque renal calculi. Presumed posttraumatic deformity of  the pelvis.    ABRAHAN ALBERTO MD   XR Chest 2 Views     Status: None    Narrative    CHEST TWO VIEWS  3/10/2020 7:45 PM     HISTORY: Aspiration pneumonitis (H).    COMPARISON: Chest x-rays dated 2/24/2020.    FINDINGS:  Lungs are hypoaerated. Increased interstitial markings in  the bilateral bases could be due to atelectasis versus infiltrate.  These are slightly more prominent than on the prior study  "dated  2/24/2020; however, the hypoaeration is also more prominent since that  time. Slightly increased interstitial markings in the right midlung  also could represent a subtle infiltrate. Prominence of the ascending  aorta could be due to tortuosity or ectasia. Heart size is within  normal limits. No pneumothorax or significant pleural fluid  collection. No acute fracture is seen.      Impression    IMPRESSION:  1. Hypoaeration and bibasilar strandy densities are noted. These  densities could be due to atelectasis versus infiltrates.  2. Subtle increased interstitial markings right midlung may also  represent infiltrate.  3. Prominence of the ascending thoracic aorta could be due to  tortuosity versus ectasia/aneurysmal dilatation.    BARTOLO KEATING MD           Assessment & Plan     1. Oropharyngeal dysphagia    - OTOLARYNGOLOGY REFERRAL; Future  - SPEECH THERAPY REFERRAL; Future    2. Aspiration pneumonitis (H)    - metroNIDAZOLE (FLAGYL) 50 mg/mL SUSP; Take 10 mLs (500 mg) by mouth 2 times daily for 10 days  Dispense: 200 mL; Refill: 1  - XR Chest 2 Views    3. History of traumatic brain injury    - OTOLARYNGOLOGY REFERRAL; Future  - SPEECH THERAPY REFERRAL; Future    4. S/P percutaneous endoscopic gastrostomy (PEG) tube placement (H)    - XR KUB  - GASTROENTEROLOGY ADULT REF CONSULT ONLY    5. Insomnia due to medical condition    - nortriptyline (PAMELOR) 10 MG/5ML solution; Take 5 mLs (10 mg) by mouth At Bedtime  Dispense: 473 mL; Refill: 2     BMI:   Estimated body mass index is 26.01 kg/m  as calculated from the following:    Height as of this encounter: 1.651 m (5' 5\").    Weight as of this encounter: 70.9 kg (156 lb 4.8 oz).   Weight management plan: use of anticholinergic agents.        FUTURE APPOINTMENTS:       - Follow-up visit in 4 weeks.      Eric Mckinley MD  [facility]      "

## 2020-03-11 ENCOUNTER — TELEPHONE (OUTPATIENT)
Dept: FAMILY MEDICINE | Facility: CLINIC | Age: 70
End: 2020-03-11

## 2020-03-11 DIAGNOSIS — J69.0 ASPIRATION PNEUMONITIS (H): Primary | ICD-10-CM

## 2020-03-11 NOTE — TELEPHONE ENCOUNTER
Reason for Call:  Other prescription    Detailed comments: Pharmacy states that they do not have the liquid form in the following medication and are wondering if they may use the tablet form instead. Please note that the strength is different. Liquid would be 800 and the tablet will be 875. Pharmacist did ask for this to be sent high priority. Thank you.    amoxicillin-clavulanate (AUGMENTIN) 400-57 MG/5ML suspension    [id number] - [place], [place] - [address]  [phone]    Phone Number Patient can be reached at: Other phone number:  [phone]    Best Time: Any    Can we leave a detailed message on this number? Not Applicable    Call taken on 3/11/2020 at 10:01 AM by Melissa Guerra

## 2020-03-12 ENCOUNTER — TELEPHONE (OUTPATIENT)
Dept: FAMILY MEDICINE | Facility: CLINIC | Age: 70
End: 2020-03-12

## 2020-03-12 NOTE — TELEPHONE ENCOUNTER
This writer attempted to contact [facility] on 03/12/20      Reason for call results and left generic message as phone number did not indicate it was a secured line.      If patient calls back:   Registered Nurse called. Follow Triage Call workflow        Amy Peguero RN    Notes recorded by Eric Mckinley MD on 3/11/2020 at 4:41 PM CDT   Official results show worsening aspiration pneumonitis.   Continue Augmentin and Metronidazole, as prescribed during yesterday's visit.   Incidental finding includes prominent thoracic aorta or ectasia. Recommend echocardiogram AFTER patient recovers from aspiration pneumonitis. Call [phone] to schedule echocardiogram.       (Notify patient's home health nurse, Maxwell Gonsales at [phone]. Patient has cognitive deficits due history of traumatic brain injury).

## 2020-03-13 NOTE — TELEPHONE ENCOUNTER
Reason for Call:  Other     Detailed comments:  chep  returning call    Phone Number Patient can be reached at: Other phone number:  [phone]    Best Time    Can we leave a detailed message on this number?     Call taken on 3/13/2020 at 3:30 PM by Saray Lockett

## 2020-03-13 NOTE — TELEPHONE ENCOUNTER
This writer attempted to contact [facility] on 03/13/20      Reason for call discuss provider note and left message with [place] for [facility] to return the call to the clinic.       If patient calls back:   Registered Nurse called. Follow Triage Call workflow        Karoline Agosto RN

## 2020-03-13 NOTE — TELEPHONE ENCOUNTER
Patient's group home nurse, Maxwell called back and was informed of the below per provider documentation. She verbalizes understanding.     Amy Peguero RN  [facility]

## 2020-03-14 ENCOUNTER — TELEPHONE (OUTPATIENT)
Dept: GASTROENTEROLOGY | Facility: CLINIC | Age: 70
End: 2020-03-14

## 2020-03-14 NOTE — TELEPHONE ENCOUNTER
Calling patient to discuss referral to GI.  LVM for pt to call back.    Marta Bui RN  Gastroenterology Care Coordinator  [place], [place]

## 2020-03-16 ENCOUNTER — TELEPHONE (OUTPATIENT)
Dept: FAMILY MEDICINE | Facility: CLINIC | Age: 70
End: 2020-03-16

## 2020-03-16 NOTE — TELEPHONE ENCOUNTER
Spoke to home health care nurse. Concern is patient is eating food and aspirating.    Marta Bui RN  Gastroenterology Care Coordinator  [facility] [place]

## 2020-03-16 NOTE — TELEPHONE ENCOUNTER
Notes recorded by Jean-Paul Sewell on 3/13/2020 at 2:55 PM CDT   [facility] to [email or web address].   ------     Notes recorded by Eric Mckinley MD on 3/11/2020 at 4:35 PM CDT   Official x-ray results show no bowel obstruction and no significant stool burden.   The incidental finding of a liver granuloma is not worrisome.     (Notify patient's home health nurse, Maxwell Gonsales at [phone]. Patient has cognitive deficits due history of traumatic brain injury).     The number listed is the director number.  He gave a nurses number [phone], was not able to leave a message.  Called back to the director and left a voice message.    This writer attempted to contact the nurse on 03/16/20      Reason for call results and left message.      If patient calls back:   Registered Nurse called. Follow Triage Call workflow        Donna Carter, Brenda Bush RN

## 2020-03-17 NOTE — TELEPHONE ENCOUNTER
Attempted to contact via nurses number.    This writer attempted to contact the nurse on 03/17/20      Reason for call results and unable to leave a message. Mail box is full.      If patient calls back:   Registered Nurse called. Follow Triage Call workflow        Donna Carter RN

## 2020-03-18 NOTE — TELEPHONE ENCOUNTER
Discuss provider note, xray results with Chep.     No questions or concerns at this time    CATALINA [facility]

## 2020-03-18 NOTE — TELEPHONE ENCOUNTER
This writer attempted to contact Patient's home care nurse Julio Cesar on 03/18/20      Reason for call discuss provider note and unable to leave message.- mailbox is full.       If patient calls back:   Registered Nurse called. Follow Triage Call workflow        Karoline Agosto RN

## 2020-03-23 ENCOUNTER — TELEPHONE (OUTPATIENT)
Dept: SPEECH THERAPY | Facility: CLINIC | Age: 70
End: 2020-03-23

## 2020-03-23 ENCOUNTER — TELEPHONE (OUTPATIENT)
Dept: FAMILY MEDICINE | Facility: CLINIC | Age: 70
End: 2020-03-23

## 2020-03-23 DIAGNOSIS — R13.10 DYSPHAGIA, UNSPECIFIED TYPE: Primary | ICD-10-CM

## 2020-03-23 NOTE — TELEPHONE ENCOUNTER
What type of form? medical  What day did you drop off your forms? 03/23/2020  Is there a due date? ASAP (7-10 business day to compete forms)   How would you like to receive these forms? Patient will  at the clinic when completed  Which clinic [facility] the form [facility] off at[facility]    What is the best number to contact you? Home [phone]  What time works best to contact you with in 4 hrs? ANY  Is it okay to leave a message? Yes     PLACED IN  MAIL BOX     Shirley Lester

## 2020-03-23 NOTE — TELEPHONE ENCOUNTER
Dr. Mckinley,    I'm messaging to clarify an order you entered for your patient, Dick Avila. He is currently referred for and scheduled to be seen for a clinical swallow study. After looking at his chart and speaking with his group home I think it is likely that even if we did a clinical swallow study, we would likely need a videoswallow study as well to determine if he is aspirating. Given that these can be done together rather than 2 separate appointments, I'm wondering if you would prefer that Dick be seen for a videoswallow study. Please let me know what you would prefer and we can get things set us as needed.    Thank you,  Margaret Chen MA, CCC-SLP  [facility]  [phone] (Phone)  [phone] (Fax)

## 2020-03-24 ENCOUNTER — MEDICAL CORRESPONDENCE (OUTPATIENT)
Dept: HEALTH INFORMATION MANAGEMENT | Facility: CLINIC | Age: 70
End: 2020-03-24

## 2020-03-24 NOTE — TELEPHONE ENCOUNTER
Received Physician order form and Standard order form and placed in Dr Bustamante's office for Dr Mckinley who is off this week. Please review and advise.  [facility]  2nd Floor  Primary Care

## 2020-03-25 NOTE — TELEPHONE ENCOUNTER
Patient's form will be deliver to the  this afternoon for pickup after 4p.  Jimmie Valadez,  [facility]    Please notify Chep RN to let them know the above info.  And remind the person who is picking up to bring their photo ID.  Jimmie Valadez,  [facility]     NOTE: If patient and or guardians calls the clinic before the care teams gets a chance to call them, please notify the caller the above information regarding pickup times, remind them to bring a photo ID, document and close the encounter.  Jimmie Valadez,  [facility]    FYI:  Anything completed after 2:00p will not be delivered until the next business day after 3p.   Jimmie Valadez,  for [facility].

## 2020-03-25 NOTE — TELEPHONE ENCOUNTER
Called left msg for [facility] ([phone]) regarding this writer's note below.  Jimmie Valadez,  For [facility]st [facility] ([facility])

## 2020-04-01 ENCOUNTER — VIRTUAL VISIT (OUTPATIENT)
Dept: GASTROENTEROLOGY | Facility: CLINIC | Age: 70
End: 2020-04-01
Attending: INTERNAL MEDICINE
Payer: COMMERCIAL

## 2020-04-01 DIAGNOSIS — R13.12 OROPHARYNGEAL DYSPHAGIA: Primary | ICD-10-CM

## 2020-04-01 PROCEDURE — 99201 ZZC OFFICE/OUTPT VISIT, NEW, LEVEL I: CPT | Mod: GT | Performed by: INTERNAL MEDICINE

## 2020-04-01 NOTE — PATIENT INSTRUCTIONS
Follow-up with the speech therapist. You should not eat or drink anything by mouth until then.    If your diarrhea becomes more frequent - stop scheduled senna and switch to as needed.
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
## 2020-07-16 DIAGNOSIS — M25.50 POLYARTHRALGIA: ICD-10-CM

## 2020-07-16 DIAGNOSIS — I10 HYPERTENSION GOAL BP (BLOOD PRESSURE) < 150/90: ICD-10-CM

## 2020-07-16 DIAGNOSIS — E78.5 HYPERLIPIDEMIA LDL GOAL <130: ICD-10-CM

## 2020-07-16 DIAGNOSIS — E03.9 ACQUIRED HYPOTHYROIDISM: ICD-10-CM

## 2020-07-20 RX ORDER — ATENOLOL 50 MG/1
TABLET ORAL
Qty: 60 TABLET | Refills: 5 | Status: SHIPPED | OUTPATIENT
Start: 2020-07-20 | End: 2021-01-04

## 2020-07-20 RX ORDER — ATORVASTATIN CALCIUM 10 MG/1
TABLET, FILM COATED ORAL
Qty: 30 TABLET | Refills: 5 | Status: SHIPPED | OUTPATIENT
Start: 2020-07-20 | End: 2021-01-04

## 2020-07-20 RX ORDER — LEVOTHYROXINE SODIUM 25 UG/1
TABLET ORAL
Qty: 28 TABLET | Refills: 0 | Status: SHIPPED | OUTPATIENT
Start: 2020-07-20 | End: 2020-08-11

## 2020-07-20 RX ORDER — ACETAMINOPHEN 325 MG/1
TABLET ORAL
Qty: 168 TABLET | Refills: 2 | Status: SHIPPED | OUTPATIENT
Start: 2020-07-20 | End: 2021-01-04

## 2020-07-20 NOTE — TELEPHONE ENCOUNTER
Prescription approved per [facility] Refill Protocol.      Sheree Velasquez RN, BSN, PHN

## 2020-07-20 NOTE — TELEPHONE ENCOUNTER
"For levothyroxine:  Routing refill request to provider for review/approval because:  Labs not current:  TSH    For atenolol and amlodipine:  Prescription approved per [facility] Refill Protocol.        Requested Prescriptions   Pending Prescriptions Disp Refills     atorvastatin (LIPITOR) 10 MG tablet [Pharmacy Med Name: ATORVASTATIN 10MG TAB] 28 tablet      Sig: TAKE 1 TABLET PER PEG TUBE AT BEDTIME       Statins Protocol Passed - 7/20/2020 10:17 AM        Passed - LDL on file in past 12 months     Recent Labs   Lab Test 02/24/20  1428   LDL 40             Passed - No abnormal creatine kinase in past 12 months     No lab results found.             Passed - Recent (12 mo) or future (30 days) visit within the authorizing provider's specialty     Patient has had an office visit with the authorizing provider or a provider within the authorizing providers department within the previous 12 mos or has a future within next 30 days. See \"Patient Info\" tab in inbasket, or \"Choose Columns\" in Meds & Orders section of the refill encounter.              Passed - Medication is active on med list        Passed - Patient is age 18 or older           levothyroxine (SYNTHROID/LEVOTHROID) 25 MCG tablet [Pharmacy Med Name: LEVOTHYROXIN 25MCG TAB] 28 tablet      Sig: TAKE 1 TABLET PER PEG TUBE EVERY MORNING       Thyroid Protocol Failed - 7/20/2020 10:17 AM        Failed - Normal TSH on file in past 12 months     Recent Labs   Lab Test 06/24/19   TSH 1.33              Passed - Patient is 12 years or older        Passed - Recent (12 mo) or future (30 days) visit within the authorizing provider's specialty     Patient has had an office visit with the authorizing provider or a provider within the authorizing providers department within the previous 12 mos or has a future within next 30 days. See \"Patient Info\" tab in inbasket, or \"Choose Columns\" in Meds & Orders section of the refill encounter.              Passed - Medication is active on med " "list           atenolol (TENORMIN) 50 MG tablet [Pharmacy Med Name: ATENOLOL 50MG TAB] 28 tablet      Sig: TAKE 1 TABLET PER PEG TUBE ONCE EVERY MORNING       Beta-Blockers Protocol Passed - 7/20/2020 10:17 AM        Passed - Blood pressure under 140/90 in past 12 months     BP Readings from Last 3 Encounters:   03/10/20 137/85   02/24/20 115/66   02/06/20 138/82                 Passed - Patient is age 6 or older        Passed - Recent (12 mo) or future (30 days) visit within the authorizing provider's specialty     Patient has had an office visit with the authorizing provider or a provider within the authorizing providers department within the previous 12 mos or has a future within next 30 days. See \"Patient Info\" tab in inbasket, or \"Choose Columns\" in Meds & Orders section of the refill encounter.              Passed - Medication is active on med list                 Sheree Velasquez RN, BSN, PHN      "

## 2020-07-22 NOTE — TELEPHONE ENCOUNTER
This writer attempted to contact Patient on 07/22/20      Reason for call Message below and left message.      If patient calls back:   Schedule telephone or office appointment within 1 month with PCP, document that pt called and close encounter         Rekha Fung

## 2020-08-10 ENCOUNTER — OFFICE VISIT (OUTPATIENT)
Dept: FAMILY MEDICINE | Facility: CLINIC | Age: 70
End: 2020-08-10
Payer: COMMERCIAL

## 2020-08-10 VITALS
BODY MASS INDEX: 25.33 KG/M2 | TEMPERATURE: 98.1 F | SYSTOLIC BLOOD PRESSURE: 132 MMHG | HEIGHT: 65 IN | DIASTOLIC BLOOD PRESSURE: 84 MMHG | OXYGEN SATURATION: 96 % | WEIGHT: 152 LBS | HEART RATE: 77 BPM

## 2020-08-10 DIAGNOSIS — Z72.821 INADEQUATE SLEEP HYGIENE: ICD-10-CM

## 2020-08-10 DIAGNOSIS — Z12.11 SPECIAL SCREENING FOR MALIGNANT NEOPLASMS, COLON: ICD-10-CM

## 2020-08-10 DIAGNOSIS — E03.9 ACQUIRED HYPOTHYROIDISM: ICD-10-CM

## 2020-08-10 DIAGNOSIS — K59.00 CONSTIPATION, UNSPECIFIED CONSTIPATION TYPE: ICD-10-CM

## 2020-08-10 DIAGNOSIS — R60.0 BILATERAL LOWER EXTREMITY EDEMA: Primary | ICD-10-CM

## 2020-08-10 LAB — TSH SERPL DL<=0.005 MIU/L-ACNC: 1.54 MU/L (ref 0.4–4)

## 2020-08-10 PROCEDURE — 84443 ASSAY THYROID STIM HORMONE: CPT | Performed by: NURSE PRACTITIONER

## 2020-08-10 PROCEDURE — 99214 OFFICE O/P EST MOD 30 MIN: CPT | Performed by: NURSE PRACTITIONER

## 2020-08-10 PROCEDURE — 36415 COLL VENOUS BLD VENIPUNCTURE: CPT | Performed by: NURSE PRACTITIONER

## 2020-08-10 ASSESSMENT — MIFFLIN-ST. JEOR: SCORE: 1371.35

## 2020-08-10 NOTE — PROGRESS NOTES
Subjective     Gaudencio Avila is a 70 year old male who presents to clinic today for the following health issues:    HPI     Patient also been having troubles with sleep, home care nurse has been giving pt Melatonin with mild relief but pt still having these troubles. Patient also has concerns with constipation, drinking Prune Juice with no relief.     Concern - Swelling and Red Skin   Onset: 2 weeks     Description:   Patient presents to discuss swelling in both lower legs, legs are also red and irritated but no itchiness. Mild discomfort per patient.     Intensity: mild to moderate    Progression of Symptoms:  Improving with massages     Therapies Tried and outcome: Massaging with help per patient         70 year old male presents with his PCA with a few concerns today.  1. Bilateral lower extremity edema - resolved since having to wait for the appointment. It improved with increasing activity and elevating legs while resting. Not painful. No longer red. No more swelling. No chest pain or shortness of breath.    2. Trouble sleeping. Goes to bed at 10pm. Gets up at 2am to urinate and has trouble getting back to sleep. Uses melatonin sometimes. Has TV on all night. If can't fall asleep right away, will try doing other things which makes it even harder to sleep. Doesn't drink caffeine.    3. Constipation - uses senna and prune juice. Has PEG. Strict NPO. Has BM q9-10 days. Stools are very dry and painful to get out.     4. Health maintenance shows due for TSH to be rechecked. No heat or cold intolerance. Skin unchanged. Struggles with constipation as above.       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)     Hypertension goal BP (blood pressure) < 150/90     Hyperlipidemia LDL goal <130     Traumatic brain injury (H)     Dysphagia causing pulmonary aspiration with swallowing     GERD (gastroesophageal reflux  disease)     Advanced directives, counseling/discussion     Health Care Home     Osteoma of ear canal     Leukocytosis, unspecified type     Seizure disorder (H)     G tube feedings (H)     Past Surgical History:   Procedure Laterality Date     BIOPSY LIVER  2007     FRACTURE TX, ANKLE RT/LT       LAPAROTOMY EXPLORATORY      splenic rupture, liver laceration       Social History     Tobacco Use     Smoking status: Former Smoker     Packs/day: 0.50     Years: 4.00     Pack years: 2.00     Types: Cigarettes     Last attempt to quit: 1970     Years since quittin.6     Smokeless tobacco: Never Used   Substance Use Topics     Alcohol use: No     Family History   Problem Relation Age of Onset     Breast Cancer Mother      C.A.D. Mother      Hypertension Mother      Respiratory Brother         MEDARDO     Diabetes Brother      Cancer - colorectal No family hx of          Current Outpatient Medications   Medication Sig Dispense Refill     acetaminophen (TYLENOL) 325 MG tablet GIVE 2 TABLETS PER PEG TUBE THREE TIMES A DAY FOR PAIN 168 tablet 2     ACETAMINOPHEN  mg by Gastric Tube route 3 times daily Also 650 per g tube daily as needed       atenolol (TENORMIN) 50 MG tablet TAKE 1 TABLET PER PEG TUBE ONCE EVERY MORNING 60 tablet 5     atorvastatin (LIPITOR) 10 MG tablet TAKE 1 TABLET PER PEG TUBE AT BEDTIME 30 tablet 5     docusate (COLACE) 50 MG/5ML liquid 5 mLs (50 mg) by Per PEG tube route daily 237 mL 0     ipratropium - albuterol 0.5 mg/2.5 mg/3 mL (DUONEB) 0.5-2.5 (3) MG/3ML neb solution GIVE ONE VIAL THROUGH NEBULIZER FOUR TIMES A DAY FOR COPD 360 mL 11     levothyroxine (SYNTHROID/LEVOTHROID) 25 MCG tablet TAKE 1 TABLET PER PEG TUBE EVERY MORNING 28 tablet 0     LEVOTHYROXINE SODIUM PO 25 mcg by Gastric Tube route daily       melatonin 1 MG TABS tablet Take 1 mg by mouth nightly as needed for sleep       nortriptyline (PAMELOR) 10 MG/5ML solution Take 5 mLs (10 mg) by mouth At Bedtime 473 mL 2      "order for DME Equipment being ordered: PEG tube dressing. Change dressing daily or as needed. 100 each 3     Sennosides (SENNA) 8.8 MG/5ML LIQD GIVE 5 ML PER PEG TUBE EVERY MORNING FOR CONSTIPATION 150 mL 11     Valproate Sodium (VALPROIC ACID) 250 MG/5ML GIVE 10 ML PER PEG TUBE TWICE A  mL 11     Allergies   Allergen Reactions     Interferons        Reviewed and updated as needed this visit by Provider         Review of Systems   Constitutional, HEENT, cardiovascular, pulmonary, gi and gu systems are negative, except as otherwise noted.      Objective    /84 (BP Location: Right arm, Patient Position: Chair, Cuff Size: Adult Regular)   Pulse 77   Temp 98.1  F (36.7  C) (Oral)   Ht 1.651 m (5' 5\")   Wt 68.9 kg (152 lb)   SpO2 96%   BMI 25.29 kg/m    Body mass index is 25.29 kg/m .  Physical Exam   GENERAL: healthy, alert and no distress  NECK: no adenopathy, no asymmetry, masses, or scars and thyroid normal to palpation  RESP: lungs clear to auscultation - no rales, rhonchi or wheezes  CV: regular rate and rhythm, normal S1 S2, no S3 or S4, no murmur, click or rub, no peripheral edema and peripheral pulses strong  ABDOMEN: soft, nontender, no hepatosplenomegaly, no masses and bowel sounds normal  MS: no gross musculoskeletal defects noted, no edema    Diagnostic Test Results:  Labs reviewed in Epic        Assessment & Plan     1. Bilateral lower extremity edema  Resolved since originally made appointment. No pain, redness or swelling today. PCA reports they have been increasing activity and elevating legs while resting.     2. Inadequate sleep hygiene  Discussed sleep hygiene.     3. Acquired hypothyroidism  Due for recheck.   - TSH with free T4 reflex    4. Special screening for malignant neoplasms, colon  - Fecal colorectal cancer screen (FIT); Future    5. Constipation, unspecified constipation type  Adding docusate to senna. Continue exercise.   - docusate (COLACE) 50 MG/5ML liquid; 5 mLs (50 " mg) by Per PEG tube route daily  Dispense: 237 mL; Refill: 0       FUTURE APPOINTMENTS:       - Follow-up visit in 4 weeks with primary care provider for annual wellness exam    Return in about 4 weeks (around 9/7/2020) for with Dr. Mckinley.     The benefits, risks and potential side effects were discussed in detail. Black box warnings discussed as relevant. All patient questions were answered. The patient was instructed to follow up immediately if any adverse reactions develop.    Return precautions discussed, including when to seek urgent/emergent care.    Patient and PCA verbalize understanding and agree with plan of care. Patient stable for discharge.      SATINDER Up [facility]    This visit took place during the COVID 19 global pandemic.   PPE worn during the visit included: surgical mask and face shield by me and mask by patient

## 2020-08-10 NOTE — PATIENT INSTRUCTIONS
"Legs look great today!  Elevate legs when resting  Up and moving throughout the day    For sleep - work on sleep hygiene        Patient Education     Tips for Sleep Hygiene  \"Sleep hygiene\" means having good sleep habits.Follow these tips to sleep better at night:     Get on a schedule. Go to bed and get up at about the same time every day.    Listen to your body. Only try to sleep when you actually feel tired or sleepy.    Be patient. If you haven't been able to get to sleep after about 30 minutes or more, get up and do something calming or boring until you feel sleepy. Then return to bed and try again.    Don't have caffeine (coffee, tea, cola drinks, chocolate and some medicines), alcohol or nicotine (cigarettes). These can make it harder for you to fall asleep and stay asleep.    Use your bed for sleeping only. That means no TV, computer or homework in bed, especially during the evening and before bedtime.    Don't nap during the day. If you must nap, make sure it is for less than 20 minutes.    Create sleep rituals that remind your body it is time to sleep. Examples include breathing exercises, stretching or reading a book.    Avoid all electronic media (smart phone, computer, tablet) within 2 hours of bed time. The \"blue light\" in these devices activates the part of the brain that keeps you awake.    Dim the lights at night.    Get early morning sources of light (walk in the sunshine) to help set sleep patterns at night.    Try a bath or shower before bed. Having a warm bath 1 to 2 hours before bedtime can help you feel sleepy. Hot baths can make you alert, so be mindful of the temperature.    Don't watch the clock. Checking the clock during the night can wake you up. It can also lead to negative thoughts such as, \"I will never fall asleep,\" which can increase anxiety and sleeplessness.    Use a sleep diary. Track your sleep schedule to know your sleep patterns and to see where you can improve.    Get regular " exercise every day. Try not to do heavy exercise in the 4 hours before bedtime.    Eat a healthy, balanced diet.    Try eating a light, healthy snack before bed, but avoid eating a heavy meal.    Create the right sleeping area. A cool, dark, quiet room is best. If needed, try earplugs, fans and blackout curtains.    Keep your daytime routine the same even if you have a bad night sleep. Avoiding activities the next day can make it harder to sleep.  For informational purposes only. Not to replace the advice of your health care provider.   Copyright   2013 [employer]. All rights reserved. [employer] [id number] - 01/16.  For informational purposes only. Not to replace the advice of your health care provider.  Copyright   2018 [employer]. All rights reserved.

## 2020-08-11 RX ORDER — LEVOTHYROXINE SODIUM 25 UG/1
TABLET ORAL
Qty: 90 TABLET | Refills: 1 | Status: SHIPPED | OUTPATIENT
Start: 2020-08-11 | End: 2021-02-04

## 2020-08-24 ENCOUNTER — OFFICE VISIT (OUTPATIENT)
Dept: URGENT CARE | Facility: URGENT CARE | Age: 70
End: 2020-08-24
Payer: COMMERCIAL

## 2020-08-24 VITALS
OXYGEN SATURATION: 98 % | WEIGHT: 146.8 LBS | DIASTOLIC BLOOD PRESSURE: 99 MMHG | HEART RATE: 69 BPM | RESPIRATION RATE: 18 BRPM | BODY MASS INDEX: 24.43 KG/M2 | SYSTOLIC BLOOD PRESSURE: 167 MMHG | TEMPERATURE: 96.6 F

## 2020-08-24 DIAGNOSIS — A08.4 VIRAL GASTROENTERITIS: Primary | ICD-10-CM

## 2020-08-24 PROCEDURE — 99213 OFFICE O/P EST LOW 20 MIN: CPT | Performed by: FAMILY MEDICINE

## 2020-08-24 ASSESSMENT — ENCOUNTER SYMPTOMS
CHILLS: 0
ABDOMINAL PAIN: 0
VOMITING: 0
CONSTIPATION: 0
RHINORRHEA: 0
DIARRHEA: 1
SORE THROAT: 0
NAUSEA: 0
SHORTNESS OF BREATH: 0
DIAPHORESIS: 0
FEVER: 0
BLOOD IN STOOL: 0
COUGH: 0
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
## 2020-10-23 ENCOUNTER — ANCILLARY PROCEDURE (OUTPATIENT)
Dept: GENERAL RADIOLOGY | Facility: CLINIC | Age: 70
End: 2020-10-23
Attending: INTERNAL MEDICINE
Payer: MEDICARE

## 2020-10-23 ENCOUNTER — OFFICE VISIT (OUTPATIENT)
Dept: FAMILY MEDICINE | Facility: CLINIC | Age: 70
End: 2020-10-23
Payer: MEDICARE

## 2020-10-23 VITALS
SYSTOLIC BLOOD PRESSURE: 134 MMHG | DIASTOLIC BLOOD PRESSURE: 85 MMHG | HEART RATE: 74 BPM | TEMPERATURE: 97.9 F | HEIGHT: 65 IN | BODY MASS INDEX: 25.12 KG/M2 | WEIGHT: 150.8 LBS | OXYGEN SATURATION: 94 % | RESPIRATION RATE: 16 BRPM

## 2020-10-23 DIAGNOSIS — Z12.11 SCREEN FOR COLON CANCER: ICD-10-CM

## 2020-10-23 DIAGNOSIS — Z93.1 G TUBE FEEDINGS (H): ICD-10-CM

## 2020-10-23 DIAGNOSIS — Z23 NEED FOR PROPHYLACTIC VACCINATION AND INOCULATION AGAINST INFLUENZA: ICD-10-CM

## 2020-10-23 DIAGNOSIS — R19.7 DIARRHEA, UNSPECIFIED TYPE: Primary | ICD-10-CM

## 2020-10-23 DIAGNOSIS — Z91.89 AT RISK FOR ASPIRATION RELATED TO TUBE FEEDING: ICD-10-CM

## 2020-10-23 DIAGNOSIS — R19.5 POSITIVE FIT (FECAL IMMUNOCHEMICAL TEST): ICD-10-CM

## 2020-10-23 DIAGNOSIS — R19.7 DIARRHEA, UNSPECIFIED TYPE: ICD-10-CM

## 2020-10-23 DIAGNOSIS — E03.9 HYPOTHYROIDISM, UNSPECIFIED TYPE: ICD-10-CM

## 2020-10-23 LAB
ALBUMIN SERPL-MCNC: 3.4 G/DL (ref 3.4–5)
ALP SERPL-CCNC: 95 U/L (ref 40–150)
ALT SERPL W P-5'-P-CCNC: 86 U/L (ref 0–70)
ANION GAP SERPL CALCULATED.3IONS-SCNC: 5 MMOL/L (ref 3–14)
AST SERPL W P-5'-P-CCNC: 44 U/L (ref 0–45)
BASOPHILS # BLD AUTO: 0 10E9/L (ref 0–0.2)
BASOPHILS NFR BLD AUTO: 0.3 %
BILIRUB SERPL-MCNC: 0.3 MG/DL (ref 0.2–1.3)
BUN SERPL-MCNC: 22 MG/DL (ref 7–30)
C DIFF TOX B STL QL: NEGATIVE
CALCIUM SERPL-MCNC: 9.1 MG/DL (ref 8.5–10.1)
CHLORIDE SERPL-SCNC: 107 MMOL/L (ref 94–109)
CO2 SERPL-SCNC: 28 MMOL/L (ref 20–32)
CREAT SERPL-MCNC: 0.47 MG/DL (ref 0.66–1.25)
DIFFERENTIAL METHOD BLD: NORMAL
EOSINOPHIL # BLD AUTO: 0.2 10E9/L (ref 0–0.7)
EOSINOPHIL NFR BLD AUTO: 2.8 %
ERYTHROCYTE [DISTWIDTH] IN BLOOD BY AUTOMATED COUNT: 13.2 % (ref 10–15)
GFR SERPL CREATININE-BSD FRML MDRD: >90 ML/MIN/{1.73_M2}
GLUCOSE SERPL-MCNC: 59 MG/DL (ref 70–99)
HCT VFR BLD AUTO: 46.3 % (ref 40–53)
HGB BLD-MCNC: 14.9 G/DL (ref 13.3–17.7)
INR PPP: 1.07 (ref 0.86–1.14)
LYMPHOCYTES # BLD AUTO: 3.2 10E9/L (ref 0.8–5.3)
LYMPHOCYTES NFR BLD AUTO: 44.2 %
MCH RBC QN AUTO: 31.9 PG (ref 26.5–33)
MCHC RBC AUTO-ENTMCNC: 32.2 G/DL (ref 31.5–36.5)
MCV RBC AUTO: 99 FL (ref 78–100)
MONOCYTES # BLD AUTO: 0.8 10E9/L (ref 0–1.3)
MONOCYTES NFR BLD AUTO: 11.4 %
NEUTROPHILS # BLD AUTO: 3 10E9/L (ref 1.6–8.3)
NEUTROPHILS NFR BLD AUTO: 41.3 %
PLATELET # BLD AUTO: 157 10E9/L (ref 150–450)
POTASSIUM SERPL-SCNC: 4.4 MMOL/L (ref 3.4–5.3)
PROT SERPL-MCNC: 8.1 G/DL (ref 6.8–8.8)
RBC # BLD AUTO: 4.67 10E12/L (ref 4.4–5.9)
SODIUM SERPL-SCNC: 140 MMOL/L (ref 133–144)
SPECIMEN SOURCE: NORMAL
TSH SERPL DL<=0.005 MIU/L-ACNC: 2.13 MU/L (ref 0.4–4)
WBC # BLD AUTO: 7.2 10E9/L (ref 4–11)

## 2020-10-23 PROCEDURE — 87209 SMEAR COMPLEX STAIN: CPT | Performed by: INTERNAL MEDICINE

## 2020-10-23 PROCEDURE — 87493 C DIFF AMPLIFIED PROBE: CPT | Performed by: INTERNAL MEDICINE

## 2020-10-23 PROCEDURE — 90662 IIV NO PRSV INCREASED AG IM: CPT | Performed by: INTERNAL MEDICINE

## 2020-10-23 PROCEDURE — 87177 OVA AND PARASITES SMEARS: CPT | Performed by: INTERNAL MEDICINE

## 2020-10-23 PROCEDURE — 85025 COMPLETE CBC W/AUTO DIFF WBC: CPT | Performed by: INTERNAL MEDICINE

## 2020-10-23 PROCEDURE — 82274 ASSAY TEST FOR BLOOD FECAL: CPT | Performed by: INTERNAL MEDICINE

## 2020-10-23 PROCEDURE — 71046 X-RAY EXAM CHEST 2 VIEWS: CPT | Performed by: RADIOLOGY

## 2020-10-23 PROCEDURE — 83516 IMMUNOASSAY NONANTIBODY: CPT | Mod: 59 | Performed by: INTERNAL MEDICINE

## 2020-10-23 PROCEDURE — 99214 OFFICE O/P EST MOD 30 MIN: CPT | Mod: 25 | Performed by: INTERNAL MEDICINE

## 2020-10-23 PROCEDURE — 83516 IMMUNOASSAY NONANTIBODY: CPT | Performed by: INTERNAL MEDICINE

## 2020-10-23 PROCEDURE — 99000 SPECIMEN HANDLING OFFICE-LAB: CPT | Performed by: INTERNAL MEDICINE

## 2020-10-23 PROCEDURE — 90471 IMMUNIZATION ADMIN: CPT | Performed by: INTERNAL MEDICINE

## 2020-10-23 PROCEDURE — 85610 PROTHROMBIN TIME: CPT | Performed by: INTERNAL MEDICINE

## 2020-10-23 PROCEDURE — 82705 FATS/LIPIDS FECES QUAL: CPT | Mod: 90 | Performed by: INTERNAL MEDICINE

## 2020-10-23 PROCEDURE — 83993 ASSAY FOR CALPROTECTIN FECAL: CPT | Performed by: INTERNAL MEDICINE

## 2020-10-23 PROCEDURE — 87506 IADNA-DNA/RNA PROBE TQ 6-11: CPT | Performed by: INTERNAL MEDICINE

## 2020-10-23 PROCEDURE — 36415 COLL VENOUS BLD VENIPUNCTURE: CPT | Performed by: INTERNAL MEDICINE

## 2020-10-23 PROCEDURE — 80053 COMPREHEN METABOLIC PANEL: CPT | Performed by: INTERNAL MEDICINE

## 2020-10-23 PROCEDURE — 84443 ASSAY THYROID STIM HORMONE: CPT | Performed by: INTERNAL MEDICINE

## 2020-10-23 ASSESSMENT — PAIN SCALES - GENERAL: PAINLEVEL: NO PAIN (0)

## 2020-10-23 ASSESSMENT — MIFFLIN-ST. JEOR: SCORE: 1365.9

## 2020-10-23 NOTE — LETTER
October 26, 2020      Gaudencio Avila  [address]  [place] [address]        Dear ,    Chest x-rays show no evidence of pneumonia. Please don't be tempt to consume food/drink fluids by mouth due to your risk of aspiration. Let's keep your tube feedings at the same amount and frequency. For any questions, you may call my office at [phone].     Sincerely,     Eric Mckinley MD   Internal Medicine     Results for orders placed or performed in visit on 10/23/20   Fecal colorectal cancer screen (FIT)     Status: Abnormal   Result Value Ref Range    Occult Blood Scn FIT Positive (A) NEG^Negative   Fat Stool Qual Random Collection     Status: None   Result Value Ref Range    Neutral Fat Fecal Normal Normal    Split Fat Fecal Normal Normal   Clostridium difficile Toxin B PCR     Status: None    Specimen: Feces   Result Value Ref Range    Specimen Description Feces     C Diff Toxin B PCR Negative NEG^Negative   Enteric Bacteria and Virus Panel by PAULA Stool     Status: None    Specimen: Feces   Result Value Ref Range    Campylobacter group by PAULA Not Detected NDET^Not Detected    Salmonella species by PAULA Not Detected NDET^Not Detected    Shigella species by PAULA Not Detected NDET^Not Detected    Vibrio group by PAULA Not Detected NDET^Not Detected    Rotavirus A by PAULA Not Detected NDET^Not Detected    Shiga toxin 1 gene by PAULA Not Detected NDET^Not Detected    Shiga toxin 2 gene by PAULA Not Detected NDET^Not Detected    Norovirus I and II by PAULA Not Detected NDET^Not Detected    Yersinia enterocolitica by PAULA Not Detected NDET^Not Detected    Enteric pathogen comment       Testing performed by multiplexed, qualitative PCR using the [employer] Qu[employer]us[employer] Enteric   Pathogens Nucleic Acid Test. Results should not be used as the sole basis for diagnosis,   treatment, or other patient management decisions.     Results for orders placed or performed in visit on 10/23/20   XR Chest 2 Views     Status: None     Narrative    CHEST TWO VIEWS 10/23/2020 10:17 AM     HISTORY: At risk for aspiration related to tube feeding.    COMPARISON: March 10, 2020       Impression    IMPRESSION: There are no acute infiltrates. The cardiac silhouette is  not enlarged. Pulmonary vasculature is unremarkable.    FERNY GUADALUPE MD   CBC with platelets and differential     Status: None   Result Value Ref Range    WBC 7.2 4.0 - 11.0 10e9/L    RBC Count 4.67 4.4 - 5.9 10e12/L    Hemoglobin 14.9 13.3 - 17.7 g/dL    Hematocrit 46.3 40.0 - 53.0 %    MCV 99 78 - 100 fl    MCH 31.9 26.5 - 33.0 pg    MCHC 32.2 31.5 - 36.5 g/dL    RDW 13.2 10.0 - 15.0 %    Platelet Count 157 150 - 450 10e9/L    % Neutrophils 41.3 %    % Lymphocytes 44.2 %    % Monocytes 11.4 %    % Eosinophils 2.8 %    % Basophils 0.3 %    Absolute Neutrophil 3.0 1.6 - 8.3 10e9/L    Absolute Lymphocytes 3.2 0.8 - 5.3 10e9/L    Absolute Monocytes 0.8 0.0 - 1.3 10e9/L    Absolute Eosinophils 0.2 0.0 - 0.7 10e9/L    Absolute Basophils 0.0 0.0 - 0.2 10e9/L    Diff Method Automated Method    Comprehensive metabolic panel (BMP + Alb, Alk Phos, ALT, AST, Total. Bili, TP)     Status: Abnormal   Result Value Ref Range    Sodium 140 133 - 144 mmol/L    Potassium 4.4 3.4 - 5.3 mmol/L    Chloride 107 94 - 109 mmol/L    Carbon Dioxide 28 20 - 32 mmol/L    Anion Gap 5 3 - 14 mmol/L    Glucose 59 (L) 70 - 99 mg/dL    Urea Nitrogen 22 7 - 30 mg/dL    Creatinine 0.47 (L) 0.66 - 1.25 mg/dL    GFR Estimate >90 >60 mL/min/[1.73_m2]    GFR Estimate If Black >90 >60 mL/min/[1.73_m2]    Calcium 9.1 8.5 - 10.1 mg/dL    Bilirubin Total 0.3 0.2 - 1.3 mg/dL    Albumin 3.4 3.4 - 5.0 g/dL    Protein Total 8.1 6.8 - 8.8 g/dL    Alkaline Phosphatase 95 40 - 150 U/L    ALT 86 (H) 0 - 70 U/L    AST 44 0 - 45 U/L   TSH with free T4 reflex     Status: None   Result Value Ref Range    TSH 2.13 0.40 - 4.00 mU/L   INR     Status: None   Result Value Ref Range    INR 1.07 0.86 - 1.14

## 2020-10-23 NOTE — PROGRESS NOTES
"Subjective     Gaudencio Avila is a 70 year old male who presents to clinic today for the following health issues:    HPI         Diarrhea  Onset/Duration: 2 months - intermittent  Description:       Consistency of stool: black, water -- bright red blood noted 1-2 times since starting       Blood in stool: YES       Number of loose stools past 24 hours: NA  Progression of Symptoms: intermittent  Accompanying signs and symptoms:       Fever: no       Nausea/Vomiting: no       Abdominal pain: YES       Weight loss: no       Episodes of constipation: no  History   Ill contacts: no  Recent use of antibiotics: no  Recent travels: no  Recent medication-new or changes(Rx or OTC): no  Precipitating or alleviating factors: None  Therapies tried and outcome: Sugar/Salt warm water      Objective    /85 (BP Location: Left arm, Patient Position: Sitting, Cuff Size: Adult Regular)   Pulse 74   Temp 97.9  F (36.6  C) (Oral)   Resp 16   Ht 1.651 m (5' 5\")   Wt 68.4 kg (150 lb 12.8 oz)   SpO2 94%   BMI 25.09 kg/m    Body mass index is 25.09 kg/m .  Physical Exam   GENERAL: healthy, alert and no distress  EYES: Eyes grossly normal to inspection, PERRL and conjunctivae and sclerae normal  HENT: ear canals and TM's normal, nose and mouth without ulcers or lesions  NECK: no adenopathy, no asymmetry, masses, or scars and thyroid normal to palpation  RESP: lungs clear to auscultation - no rales, rhonchi or wheezes  CV: regular rate and rhythm, normal S1 S2, no S3 or S4, no murmur, click or rub, no peripheral edema and peripheral pulses strong  ABDOMEN: soft, nontender, no hepatosplenomegaly, no masses and bowel sounds normal  MS: no gross musculoskeletal defects noted, no edema  SKIN: no suspicious lesions or rashes  NEURO: Normal strength and tone, mentation intact and speech normal  PSYCH: mentation appears normal, affect normal/bright            Assessment & Plan   1. Diarrhea, unspecified type    - CBC with platelets and " differential  - Comprehensive metabolic panel (BMP + Alb, Alk Phos, ALT, AST, Total. Bili, TP)  - Enteric Bacteria and Virus Panel by PAULA Stool; Future  - Ova and Parasite Exam Routine; Future  - Calprotectin Feces; Future  - Clostridium difficile Toxin B PCR; Future  - Tissue transglutaminase jed IgA and IgG  - Fat Stool Qual Random Collection; Future  - GASTROENTEROLOGY ADULT REF CONSULT ONLY; Future    2. G tube feedings (H)    - INR    3. Need for prophylactic vaccination and inoculation against influenza    - FLUZONE HIGH DOSE 65+  [02671]  - Vaccine Administration, Initial [88651]    4. Hypothyroidism, unspecified type    - TSH with free T4 reflex    5. At risk for aspiration related to tube feeding    - XR Chest 2 Views    6. Screen for colon cancer    - Fecal colorectal cancer screen (FIT); Future    7. Positive FIT (fecal immunochemical test)    - GASTROENTEROLOGY ADULT REF CONSULT ONLY; Future        Return in about 4 weeks (around 11/20/2020).    Eric Mckinley MD  [facility]

## 2020-10-24 LAB
C COLI+JEJUNI+LARI FUSA STL QL NAA+PROBE: NOT DETECTED
EC STX1 GENE STL QL NAA+PROBE: NOT DETECTED
EC STX2 GENE STL QL NAA+PROBE: NOT DETECTED
ENTERIC PATHOGEN COMMENT: NORMAL
FAT STL QL: NORMAL
NEUTRAL FAT STL QL: NORMAL
NOROV GI+II ORF1-ORF2 JNC STL QL NAA+PR: NOT DETECTED
RVA NSP5 STL QL NAA+PROBE: NOT DETECTED
SALMONELLA SP RPOD STL QL NAA+PROBE: NOT DETECTED
SHIGELLA SP+EIEC IPAH STL QL NAA+PROBE: NOT DETECTED
V CHOL+PARA RFBL+TRKH+TNAA STL QL NAA+PR: NOT DETECTED
Y ENTERO RECN STL QL NAA+PROBE: NOT DETECTED

## 2020-10-25 LAB — HEMOCCULT STL QL IA: POSITIVE

## 2020-10-26 LAB
CALPROTECTIN STL-MCNT: 6.3 MG/KG (ref 0–49.9)
O+P STL MICRO: NORMAL
O+P STL MICRO: NORMAL
SPECIMEN SOURCE: NORMAL
TTG IGA SER-ACNC: 2 U/ML
TTG IGG SER-ACNC: 1 U/ML

## 2020-11-05 DIAGNOSIS — G47.01 INSOMNIA DUE TO MEDICAL CONDITION: ICD-10-CM

## 2020-11-06 RX ORDER — NORTRIPTYLINE HYDROCHLORIDE 10 MG/5ML
10 SOLUTION ORAL AT BEDTIME
Qty: 150 ML | Refills: 10 | Status: SHIPPED | OUTPATIENT
Start: 2020-11-06 | End: 2021-01-27

## 2020-11-06 NOTE — TELEPHONE ENCOUNTER
Prescription approved per [facility] Refill Protocol.  Amy Peguero RN  [facility]

## 2020-11-14 ENCOUNTER — HEALTH MAINTENANCE LETTER (OUTPATIENT)
Age: 70
End: 2020-11-14

## 2020-11-17 ENCOUNTER — TELEPHONE (OUTPATIENT)
Dept: FAMILY MEDICINE | Facility: CLINIC | Age: 70
End: 2020-11-17

## 2020-11-17 DIAGNOSIS — R56.1 SEIZURE AFTER HEAD INJURY (H): ICD-10-CM

## 2020-11-17 DIAGNOSIS — Z87.09 HX OF ASPIRATION PNEUMONITIS: ICD-10-CM

## 2020-11-17 DIAGNOSIS — Z87.820 HISTORY OF TRAUMATIC BRAIN INJURY: ICD-10-CM

## 2020-11-17 NOTE — TELEPHONE ENCOUNTER
[facility] ICD 10 diagnosis verification  form placed in Dr. Mckinley's red folder in provider's bin for completion.      Fadumo STARKS)(CARMELA)

## 2020-11-26 PROBLEM — Z87.820 HISTORY OF TRAUMATIC BRAIN INJURY: Status: ACTIVE | Noted: 2020-11-26

## 2020-11-26 PROBLEM — Z87.09 HX OF ASPIRATION PNEUMONITIS: Status: ACTIVE | Noted: 2020-11-26

## 2020-11-26 PROBLEM — F02.80 MAJOR NEUROCOGNITIVE DISORDER DUE TO TRAUMATIC BRAIN INJURY (H): Status: ACTIVE | Noted: 2018-08-30

## 2020-11-26 PROBLEM — R56.1 SEIZURE AFTER HEAD INJURY (H): Status: ACTIVE | Noted: 2020-11-26

## 2020-11-26 PROBLEM — S06.9XAS MAJOR NEUROCOGNITIVE DISORDER DUE TO TRAUMATIC BRAIN INJURY (H): Status: ACTIVE | Noted: 2018-08-30

## 2021-01-19 ENCOUNTER — ANCILLARY PROCEDURE (OUTPATIENT)
Dept: GENERAL RADIOLOGY | Facility: CLINIC | Age: 71
End: 2021-01-19
Attending: INTERNAL MEDICINE
Payer: MEDICARE

## 2021-01-19 ENCOUNTER — OFFICE VISIT (OUTPATIENT)
Dept: FAMILY MEDICINE | Facility: CLINIC | Age: 71
End: 2021-01-19
Payer: MEDICARE

## 2021-01-19 DIAGNOSIS — J69.0 ASPIRATION PNEUMONITIS (H): ICD-10-CM

## 2021-01-19 DIAGNOSIS — Z00.00 ENCOUNTER FOR MEDICARE ANNUAL WELLNESS EXAM: Primary | ICD-10-CM

## 2021-01-19 DIAGNOSIS — R56.1 SEIZURE AFTER HEAD INJURY (H): ICD-10-CM

## 2021-01-19 DIAGNOSIS — M25.50 POLYARTHRALGIA: ICD-10-CM

## 2021-01-19 DIAGNOSIS — K59.00 CONSTIPATION, UNSPECIFIED CONSTIPATION TYPE: ICD-10-CM

## 2021-01-19 DIAGNOSIS — Z93.1 GASTROSTOMY TUBE DEPENDENT (H): ICD-10-CM

## 2021-01-19 DIAGNOSIS — Z93.1 S/P PERCUTANEOUS ENDOSCOPIC GASTROSTOMY (PEG) TUBE PLACEMENT (H): ICD-10-CM

## 2021-01-19 DIAGNOSIS — E78.5 HYPERLIPIDEMIA LDL GOAL <100: ICD-10-CM

## 2021-01-19 LAB
ALT SERPL W P-5'-P-CCNC: 60 U/L (ref 0–70)
ANION GAP SERPL CALCULATED.3IONS-SCNC: 5 MMOL/L (ref 3–14)
BASOPHILS # BLD AUTO: 0 10E9/L (ref 0–0.2)
BASOPHILS NFR BLD AUTO: 0.3 %
BUN SERPL-MCNC: 19 MG/DL (ref 7–30)
CALCIUM SERPL-MCNC: 9 MG/DL (ref 8.5–10.1)
CHLORIDE SERPL-SCNC: 104 MMOL/L (ref 94–109)
CHOLEST SERPL-MCNC: 90 MG/DL
CO2 SERPL-SCNC: 28 MMOL/L (ref 20–32)
CREAT SERPL-MCNC: 0.55 MG/DL (ref 0.66–1.25)
DIFFERENTIAL METHOD BLD: NORMAL
EOSINOPHIL # BLD AUTO: 0.2 10E9/L (ref 0–0.7)
EOSINOPHIL NFR BLD AUTO: 2 %
ERYTHROCYTE [DISTWIDTH] IN BLOOD BY AUTOMATED COUNT: 13.5 % (ref 10–15)
GFR SERPL CREATININE-BSD FRML MDRD: >90 ML/MIN/{1.73_M2}
GLUCOSE SERPL-MCNC: 81 MG/DL (ref 70–99)
HCT VFR BLD AUTO: 44.8 % (ref 40–53)
HDLC SERPL-MCNC: 32 MG/DL
HGB BLD-MCNC: 14.9 G/DL (ref 13.3–17.7)
LDLC SERPL CALC-MCNC: 23 MG/DL
LYMPHOCYTES # BLD AUTO: 4.2 10E9/L (ref 0.8–5.3)
LYMPHOCYTES NFR BLD AUTO: 43.6 %
MCH RBC QN AUTO: 32.9 PG (ref 26.5–33)
MCHC RBC AUTO-ENTMCNC: 33.3 G/DL (ref 31.5–36.5)
MCV RBC AUTO: 99 FL (ref 78–100)
MONOCYTES # BLD AUTO: 0.8 10E9/L (ref 0–1.3)
MONOCYTES NFR BLD AUTO: 8.5 %
NEUTROPHILS # BLD AUTO: 4.4 10E9/L (ref 1.6–8.3)
NEUTROPHILS NFR BLD AUTO: 45.6 %
NONHDLC SERPL-MCNC: 58 MG/DL
PHOSPHATE SERPL-MCNC: 3.2 MG/DL (ref 2.5–4.5)
PLATELET # BLD AUTO: 169 10E9/L (ref 150–450)
POTASSIUM SERPL-SCNC: 4.9 MMOL/L (ref 3.4–5.3)
RBC # BLD AUTO: 4.53 10E12/L (ref 4.4–5.9)
SODIUM SERPL-SCNC: 137 MMOL/L (ref 133–144)
TRIGL SERPL-MCNC: 175 MG/DL
VALPROATE SERPL-MCNC: 64 MG/L (ref 50–100)
WBC # BLD AUTO: 9.6 10E9/L (ref 4–11)

## 2021-01-19 PROCEDURE — 85025 COMPLETE CBC W/AUTO DIFF WBC: CPT | Performed by: INTERNAL MEDICINE

## 2021-01-19 PROCEDURE — 74019 RADEX ABDOMEN 2 VIEWS: CPT | Performed by: RADIOLOGY

## 2021-01-19 PROCEDURE — 36415 COLL VENOUS BLD VENIPUNCTURE: CPT | Performed by: INTERNAL MEDICINE

## 2021-01-19 PROCEDURE — 84100 ASSAY OF PHOSPHORUS: CPT | Performed by: INTERNAL MEDICINE

## 2021-01-19 PROCEDURE — 99213 OFFICE O/P EST LOW 20 MIN: CPT | Mod: 25 | Performed by: INTERNAL MEDICINE

## 2021-01-19 PROCEDURE — 80048 BASIC METABOLIC PNL TOTAL CA: CPT | Performed by: INTERNAL MEDICINE

## 2021-01-19 PROCEDURE — 80164 ASSAY DIPROPYLACETIC ACD TOT: CPT | Performed by: INTERNAL MEDICINE

## 2021-01-19 PROCEDURE — 71046 X-RAY EXAM CHEST 2 VIEWS: CPT | Performed by: RADIOLOGY

## 2021-01-19 PROCEDURE — 84460 ALANINE AMINO (ALT) (SGPT): CPT | Performed by: INTERNAL MEDICINE

## 2021-01-19 PROCEDURE — 99397 PER PM REEVAL EST PAT 65+ YR: CPT | Performed by: INTERNAL MEDICINE

## 2021-01-19 PROCEDURE — 80061 LIPID PANEL: CPT | Performed by: INTERNAL MEDICINE

## 2021-01-19 RX ORDER — IPRATROPIUM BROMIDE AND ALBUTEROL SULFATE 2.5; .5 MG/3ML; MG/3ML
SOLUTION RESPIRATORY (INHALATION)
Qty: 360 ML | Refills: 11 | Status: SHIPPED | OUTPATIENT
Start: 2021-01-19 | End: 2021-02-05

## 2021-01-19 RX ORDER — SENNOSIDES 8.8 MG/5ML
LIQUID ORAL
Qty: 150 ML | Refills: 11 | Status: SHIPPED | OUTPATIENT
Start: 2021-01-19 | End: 2021-01-27

## 2021-01-19 RX ORDER — ACETAMINOPHEN 325 MG/1
650 TABLET ORAL EVERY 8 HOURS PRN
Qty: 168 TABLET | Refills: 2 | Status: SHIPPED | OUTPATIENT
Start: 2021-01-19 | End: 2021-01-27

## 2021-01-19 NOTE — PROGRESS NOTES
"  SUBJECTIVE:   Gaudencio Avila is a 70 year old male who presents for Preventive Visit.    Patient has been advised of split billing requirements and indicates understanding: Yes     Are you in the first 12 months of your Medicare Part B coverage?  No    Physical Health:    In general, how would you rate your overall physical health? fair    Outside of work, how many days during the week do you exercise? none    Outside of work, approximately how many minutes a day do you exercise?not applicable    If you drink alcohol do you typically have >3 drinks per day or >7 drinks per week? No    Do you usually eat at least 4 servings of fruit and vegetables a day, include whole grains & fiber and avoid regularly eating high fat or \"junk\" foods? NO    Do you have any problems taking medications regularly?  No    Do you have any side effects from medications? none    Needs assistance for the following daily activities: telephone use, transportation, shopping, preparing meals, housework, bathing, laundry and taking medicine    Which of the following safety concerns are present in your home?  none identified     Hearing impairment: No    In the past 6 months, have you been bothered by leaking of urine? Yes (requesting incontinence pads).    Feeding tube supplies and formula. Syringe, distilled water and medication .    Incontinence pads.  Wound dressing supplies.    Mental Health:    In general, how would you rate your overall mental or emotional health? fair  PHQ-2 Score:      Do you feel safe in your environment? Yes    Have you ever done Advance Care Planning? (For example, a Health Directive, POLST, or a discussion with a medical provider or your loved ones about your wishes): No, advance care planning information given to patient to review.  Advanced care planning was discussed at today's visit. (FULL CODE)    Additional concerns to address?  No    Cognitive Screenin) Repeat 3 items (Leader, Season, Table)  "   2) Clock draw: ABNORMAL   3) 3 item recall: Recalls 1 object   Results: ABNORMAL clock, 1-2 items recalled: PROBABLE COGNITIVE IMPAIRMENT, **INFORM PROVIDER**    Mini-CogTM Copyright DAVID Ren. Licensed by the author for use in [facility]; reprinted with permission (jaswant@[facility]). All rights reserved.      Do you have sleep apnea, excessive snoring or daytime drowsiness?: yes      Reviewed and updated as needed this visit by clinical staff                 Reviewed and updated as needed this visit by Provider                Social History     Tobacco Use     Smoking status: Former Smoker     Packs/day: 0.50     Years: 4.00     Pack years: 2.00     Types: Cigarettes     Quit date: 1970     Years since quittin.0     Smokeless tobacco: Never Used   Substance Use Topics     Alcohol use: No                           Current providers sharing in care for this patient include:   Patient Care Team:  Eric Mckinley MD as PCP - General (Internal Medicine)  Isidra Aceves RPH as Pharmacist (Pharmacist)  Eric Mckinley MD as Assigned PCP    The following health maintenance items are reviewed in Epic and correct as of today:  Health Maintenance   Topic Date Due     MEDICARE ANNUAL WELLNESS VISIT  2019     FALL RISK ASSESSMENT  2019     COLORECTAL CANCER SCREENING  10/24/2020     DTAP/TDAP/TD IMMUNIZATION (3 - Td) 11/15/2020     PHQ-2  2021     LIPID  2021     BMP  10/23/2021     TSH W/FREE T4 REFLEX  10/23/2021     ADVANCE CARE PLANNING  2022     SPIROMETRY  Completed     HEPATITIS C SCREENING  Completed     COPD ACTION PLAN  Completed     INFLUENZA VACCINE  Completed     Pneumococcal Vaccine: 65+ Years  Completed     AORTIC ANEURYSM SCREENING (SYSTEM ASSIGNED)  Completed     Pneumococcal Vaccine: Pediatrics (0 to 5 Years) and At-Risk Patients (6 to 64 Years)  Aged Out     IPV IMMUNIZATION  Aged Out     MENINGITIS IMMUNIZATION  Aged Out     HEPATITIS B IMMUNIZATION   Aged Out     ZOSTER IMMUNIZATION  Discontinued     Labs reviewed in EPIC  BP Readings from Last 3 Encounters:   10/23/20 134/85   20 (!) 167/99   08/10/20 132/84    Wt Readings from Last 3 Encounters:   10/23/20 68.4 kg (150 lb 12.8 oz)   20 66.6 kg (146 lb 12.8 oz)   08/10/20 68.9 kg (152 lb)                  Patient Active Problem List   Diagnosis     Obesity     Acquired hypothyroidism     Hypertension goal BP (blood pressure) < 150/90     Hyperlipidemia LDL goal <130     Dysphagia causing pulmonary aspiration with swallowing     GERD (gastroesophageal reflux disease)     Advanced directives, counseling/discussion     Health Care Home     Osteoma of ear canal     Leukocytosis, unspecified type     Seizure disorder (H)     Gastrostomy tube dependent (H)     Major neurocognitive disorder due to traumatic brain injury (H)     History of traumatic brain injury     Hx of aspiration pneumonitis     Seizure after head injury (H)     Past Surgical History:   Procedure Laterality Date     BIOPSY LIVER       FRACTURE TX, ANKLE RT/LT       LAPAROTOMY EXPLORATORY      splenic rupture, liver laceration       Social History     Tobacco Use     Smoking status: Former Smoker     Packs/day: 0.50     Years: 4.00     Pack years: 2.00     Types: Cigarettes     Quit date: 1970     Years since quittin.1     Smokeless tobacco: Never Used   Substance Use Topics     Alcohol use: No     Family History   Problem Relation Age of Onset     Breast Cancer Mother      C.A.D. Mother      Hypertension Mother      Respiratory Brother         MEDARDO     Diabetes Brother      Cancer - colorectal No family hx of          Allergies   Allergen Reactions     Interferons      Recent Labs   Lab Test 21  1547 10/23/20  0950 08/10/20  1223 20  1428 20  1140 05/15/18  1221 05/15/18  1221   LDL 23  --   --  40  --   --  125*   HDL 32*  --   --  48  --   --  31*   TRIG 175*  --   --  37  --   --  199*   ALT 60 86*   --   --  39   < > 22   CR 0.55* 0.47*  --  0.59* 0.45*   < > 0.83   GFRESTIMATED >90 >90  --  >90 >90   < > >90   GFRESTBLACK >90 >90  --  >90 >90   < > >90   POTASSIUM 4.9 4.4  --  4.0 4.3   < > 4.0   TSH  --  2.13 1.54  --   --    < > 3.66    < > = values in this interval not displayed.          ROS:  CONSTITUTIONAL: NEGATIVE for fever, chills, change in weight  INTEGUMENTARY/SKIN: NEGATIVE for worrisome rashes, moles or lesions  EYES: NEGATIVE for vision changes or irritation  ENT/MOUTH: NEGATIVE for ear, mouth and throat problems  RESP: NEGATIVE for significant cough or SOB  CV: NEGATIVE for chest pain, palpitations or peripheral edema  GI: NEGATIVE for nausea, abdominal pain, heartburn, or change in bowel habits  : NEGATIVE for frequency, dysuria, or hematuria  MUSCULOSKELETAL: NEGATIVE for significant arthralgias or myalgia  NEURO: NEGATIVE for weakness, dizziness or paresthesias  ENDOCRINE: NEGATIVE for temperature intolerance, skin/hair changes  HEME: NEGATIVE for bleeding problems  PSYCHIATRIC: NEGATIVE for changes in mood or affect    OBJECTIVE:   There were no vitals taken for this visit.  EXAM:   GENERAL: healthy, alert and no distress  EYES: Eyes grossly normal to inspection,civae and sclerae normal  HENT: ear canals and TM's normal, nose and mouth without ulcers or lesions  NECK: no adenopathy, no asymmetry, masses, or scars and thyroid normal to palpation  RESP: lungs clear to auscultation - no rales, rhonchi or wheezes  CV: regular rate and rhythm, normal S1 S2, no S3 or S4, no murmur, click or rub, no peripheral edema and peripheral pulses strong  ABDOMEN: soft, nontender, no hepatosplenomegaly, no masses and bowel sounds normal  MS: no gross musculoskeletal defects noted, no edema  SKIN: no suspicious lesions or rashes  NEURO: Normal strength and tone, mentation intact and speech normal  PSYCH: mentation appears normal, affect normal/bright    Diagnostic Test Results:  Results for orders placed  or performed in visit on 01/19/21   XR KUB     Status: None    Narrative    ABDOMEN ONE VIEW  1/19/2021 4:04 PM    HISTORY: Status post percutaneous endoscopic gastrostomy (PEG) tube  placement (H).    COMPARISON: None.      Impression    IMPRESSION: Unremarkable bowel gas pattern. 6 mm calcification in the  right upper quadrant is quite peripheral and may be a gallstone rather  than a kidney stone on that side. G-tube noted.    FERNY GUADALUPE MD   XR Chest 2 Views     Status: None    Narrative    CHEST TWO VIEWS 1/19/2021 4:04 PM     HISTORY: History of aspiration pneumonia.    COMPARISON: October 23, 2020       Impression    IMPRESSION: Question some increased markings at the lung bases  bilaterally which could indicate infiltrate. Mid and upper lungs  clear. The cardiac silhouette is not enlarged. Pulmonary vasculature  is unremarkable.    FERNY GUADALUPE MD   Valproic acid     Status: None   Result Value Ref Range    Valproic Acid Level 64 50 - 100 mg/L   ALT     Status: None   Result Value Ref Range    ALT 60 0 - 70 U/L   Phosphorus     Status: None   Result Value Ref Range    Phosphorus 3.2 2.5 - 4.5 mg/dL   Lipid panel reflex to direct LDL Non-fasting     Status: Abnormal   Result Value Ref Range    Cholesterol 90 <200 mg/dL    Triglycerides 175 (H) <150 mg/dL    HDL Cholesterol 32 (L) >39 mg/dL    LDL Cholesterol Calculated 23 <100 mg/dL    Non HDL Cholesterol 58 <130 mg/dL   Basic metabolic panel  (Ca, Cl, CO2, Creat, Gluc, K, Na, BUN)     Status: Abnormal   Result Value Ref Range    Sodium 137 133 - 144 mmol/L    Potassium 4.9 3.4 - 5.3 mmol/L    Chloride 104 94 - 109 mmol/L    Carbon Dioxide 28 20 - 32 mmol/L    Anion Gap 5 3 - 14 mmol/L    Glucose 81 70 - 99 mg/dL    Urea Nitrogen 19 7 - 30 mg/dL    Creatinine 0.55 (L) 0.66 - 1.25 mg/dL    GFR Estimate >90 >60 mL/min/[1.73_m2]    GFR Estimate If Black >90 >60 mL/min/[1.73_m2]    Calcium 9.0 8.5 - 10.1 mg/dL   CBC with platelets and differential     Status:  None   Result Value Ref Range    WBC 9.6 4.0 - 11.0 10e9/L    RBC Count 4.53 4.4 - 5.9 10e12/L    Hemoglobin 14.9 13.3 - 17.7 g/dL    Hematocrit 44.8 40.0 - 53.0 %    MCV 99 78 - 100 fl    MCH 32.9 26.5 - 33.0 pg    MCHC 33.3 31.5 - 36.5 g/dL    RDW 13.5 10.0 - 15.0 %    Platelet Count 169 150 - 450 10e9/L    % Neutrophils 45.6 %    % Lymphocytes 43.6 %    % Monocytes 8.5 %    % Eosinophils 2.0 %    % Basophils 0.3 %    Absolute Neutrophil 4.4 1.6 - 8.3 10e9/L    Absolute Lymphocytes 4.2 0.8 - 5.3 10e9/L    Absolute Monocytes 0.8 0.0 - 1.3 10e9/L    Absolute Eosinophils 0.2 0.0 - 0.7 10e9/L    Absolute Basophils 0.0 0.0 - 0.2 10e9/L    Diff Method Automated Method        ASSESSMENT / PLAN:   1. Encounter for Medicare annual wellness exam  - ALT  - Lipid panel reflex to direct LDL Non-fasting  - Basic metabolic panel  (Ca, Cl, CO2, Creat, Gluc, K, Na, BUN)  - CBC with platelets and differential    2. S/P percutaneous endoscopic gastrostomy (PEG) tube placement (H)  - XR KUB    3. Gastrostomy tube dependent (H)  Please refer to AVS for instructions daily care, tube feeding and symptoms to monitor.  - XR KUB  - ALT  - Phosphorus  - Lipid panel reflex to direct LDL Non-fasting  - Basic metabolic panel  (Ca, Cl, CO2, Creat, Gluc, K, Na, BUN)  - Nutritional Supplements (ISOSOURCE 1.5 BRYAN) LIQD; Take 1 Can by mouth 3 times daily (with meals)  Dispense: 90 Can; Refill: 11    4. Seizure after head injury (H)  - Valproic acid    5. Aspiration pneumonitis (H)  - ipratropium - albuterol 0.5 mg/2.5 mg/3 mL (DUONEB) 0.5-2.5 (3) MG/3ML neb solution; GIVE ONE VIAL THROUGH NEBULIZER FOUR TIMES A DAY FOR COPD  Dispense: 360 mL; Refill: 11  - XR Chest 2 Views    6. Constipation, unspecified constipation type  - Sennosides (SENNA) 8.8 MG/5ML SYRP; GIVE 5 ML PER PEG TUBE EVERY MORNING FOR CONSTIPATION  Dispense: 150 mL; Refill: 11    7. Hyperlipidemia LDL goal <100  - Lipid panel reflex to direct LDL Non-fasting    Patient has been  "advised of split billing requirements and indicates understanding: Yes    COUNSELING:       Regular exercise       Healthy diet/nutrition       The ASCVD Risk score (Santiago DC Jr., et al., 2013) failed to calculate for the following reasons:    The valid total cholesterol range is 130 to 320 mg/dL    Estimated body mass index is 25.09 kg/m  as calculated from the following:    Height as of 10/23/20: 1.651 m (5' 5\").    Weight as of 10/23/20: 68.4 kg (150 lb 12.8 oz).    Weight management plan: None.    He reports that he quit smoking about 51 years ago. His smoking use included cigarettes. He has a 2.00 pack-year smoking history. He has never used smokeless tobacco.    Appropriate preventive services were discussed with this patient, including applicable screening as appropriate for cardiovascular disease, diabetes, osteopenia/osteoporosis, and glaucoma.  As appropriate for age/gender, discussed screening for colorectal cancer, prostate cancer, breast cancer, and cervical cancer. Checklist reviewing preventive services available has been given to the patient.    Reviewed patients plan of care and provided an AVS. The Complex Care Plan (for patients with higher acuity and needing more deliberate coordination of services) for Gaudencio meets the Care Plan requirement. This Care Plan has been established and reviewed with the caregiver.    Counseling Resources:  ATP IV Guidelines  Pooled Cohorts Equation Calculator  Breast Cancer Risk Calculator  BRCA-Related Cancer Risk Assessment: FHS-7 Tool  FRAX Risk Assessment  ICSI Preventive Guidelines  Dietary Guidelines for Americans, 2010  USDA's MyPlate  ASA Prophylaxis  Lung CA Screening    Eric Mckinley MD  [facility]  "

## 2021-01-19 NOTE — PATIENT INSTRUCTIONS
At [facility], we strive to deliver an exceptional experience to you, every time we see you. If you receive a survey, please complete it as we do value your feedback.  If you have MyChart, you can expect to receive results automatically within 24 hours of their completion.  Your provider will send a note interpreting your results as well.   If you do not have MyChart, you should receive your results in about a week by mail.    Your care team:                            Family Medicine Internal Medicine   MD Eric Romero, MD Jovanni Neal MD Katya Georgiev PA-C Megan Hill, APRN CNP    Robbin Bustamante, MD Pediatrics   Haseeb Esquivel, PAKiranC  Bere Coreas, CNP MD Briana Marcano APRN CNP   MD Hyacinth Ambrose MD Deborah Mielke, MD Macey Perez, APRN CNP  Elise Zamarripa, PADACIA Monge, CNP  MD Marisa Hussein MD Angela Wermerskirchen, MD      Clinic hours: Monday - Thursday 7 am-7 pm; Fridays 7 am-5 pm.   Urgent care: Monday - Friday 11 am-9 pm; Saturday and Sunday 9 am-5 pm.    Clinic: [phone]       [facility]: Monday - Thursday 8 am - 7 pm; Friday 8 am - 6 pm  [facility]: [phone]     Use [email or web address] for 24/7 diagnosis and treatment of dozens of conditions.      Patient Education   Personalized Prevention Plan  You are due for the preventive services outlined below.  Your care team is available to assist you in scheduling these services.  If you have already completed any of these items, please share that information with your care team to update in your medical record.  Health Maintenance Due   Topic Date Due     FALL RISK ASSESSMENT  06/05/2019     Colorectal Cancer Screening  10/24/2020     Diptheria Tetanus Pertussis (DTAP/TDAP/TD) Vaccine (3 - Td) 11/15/2020     PHQ-2  01/01/2021      PEG TUBE  "INSTRUCTIONS    Daily Care     1)  Inspect skin for redness, tenderness, swelling, drainage or gastric leakage.      2)  Inspect tube for inward or outward movement.      3)  Cleanse skin with antibacterial soap and water.  Dry thoroughly.  Gentle manipulations of tube are not harmful, but take care not to push or pull tube out of position.      4)  Use half- strength hydrogen peroxide to remove crusts, if present.  Rinse with water.  Repeat up to 2-3 times per day as needed.      5)  Avoid a dressing in most cases: dressings may promote skin breakdown and infection.  If a dressing is used, change immediately if it becomes wet.     6) Secure the tube underneath clothing or with  stretch netting\" to prevent movement.  Excess tension can cause damage to the stomach lining or enlarge the opening in the stomach.  Make sure the tube does not lay flat against the skin all the time.      Tube Feeding    1) Patient should sit upright or at least 45 degree angle during feeding and for 1 hour following the end of the feeding.       2)  Flush tube with 60 cc warm water before and after each feeding to keep the inner surface of the tube clear (food can build up and block the tube).      3) After flushing, clamp tube closed between feedings to prevent leakage.    Medications    1)  Do not mix medications with feeding formula.      2)  Medications should be given in liquid form if possible.  If only pills are available, crush finely.  Mix the powder in water until there are no clumps.       3)  Give each medication separately.  Flush tube with 10 cc of warm water between medications.      4)  Flush tube with 60 cc warm water before giving any medications and again before tube is clamped off.      When should I call my doctor?       Signs and Symptoms for infection      1)  Chills or temperature over 100.5      2) Redness, swelling, increased pain, or foul smelling drainage around the tube.       3) Contact your physician " immediately or go to the emergency room if tube falls out within the first 2 weeks after placement.        4) The tube must be replaced within 2 to 3 hours or the opening leading to your stomach may close.      5)  Bring your tube with you. Cover skin opening with a dry gauze or cloth.       6)  Abdominal bloating, nausea, cramps, vomiting or diarrhea.      7)  Unable to do feedings.      8)  Increasing size of the hole into your stomach.     9)  The crosspiece is making a sore at the skin.       10) Change in overall health: significant weight change, weakness, less urine.      11)  Any new, unexplained symptoms.        Problem Solving Guide     1) Bloating, fullness, nausea, or vomiting   - Slow feeding rate    2) Tube out of position   - Measure length of tube or look for dion.  Notify physician if it has been removed.     3) Blockage in the bowels     - Check amount of gastric residual.    -Re-install and flush tube with 30 cc warm water.   - If residual is 60 cc (do not withdraw more), delay feeding for 1-2 hours and check again.    -Contact physician if unable to do feedings.     4) Poor stomach emptying             - If this problem continues, your physician may prescribe a medication to help move formula through your digestive tract.     5) Gastric residual is formula left in your stomach from a previous feeding (stomach contents that haven t passed through).    - To check: withdraw stomach contents with a 60 cc syringe before scheduled feeding.  Do not take out more than 60 cc.  Inject contents back into the tube and flush.                         6) Tube Blockage/ Inadequate flushing of tube or Failure to clamp tube - Try to flush tube least 60 cc syringe of warm water.    - Avoid excessive force when irrigating tube.   - Remove excess fluid from the feeding tube with syringe.   - If unsuccessful, then notify this provider or go to ER.         Do not try to clear the blockage by inserting an object.  This  could result in damage to the tube or injury to the stomach lining.     Do not try to use cranberry juice, meat tenderizer, or carbonated beverages to unplug tube.  Acidic products cause the protein in the feeding formula to form core clogs.

## 2021-01-26 ENCOUNTER — TELEPHONE (OUTPATIENT)
Dept: FAMILY MEDICINE | Facility: CLINIC | Age: 71
End: 2021-01-26

## 2021-01-26 NOTE — TELEPHONE ENCOUNTER
.Reason for call:  Form   Our goal is to have forms completed within 72 hours, however some forms may require a visit or additional information.     Who is the form from? Patient  Where did the form come from? Patient or family brought in     What clinic location was the form placed at? [facility]  Where was the form placed? dumaiter Box/Folder  What number is listed as a contact on the form? [phone]    Phone call message - patient request for a letter, form or note:     Date needed: as soon as possible  Patient will  at the clinic when completed  Has the patient signed a consent form for release of information? YES    Additional comments: Maxwell will pick this up. Call her at [phone]    Type of letter, form or note: medical    Phone number to reach patient:  Home number on file [phone] (home)    Best Time:  anytime    Can we leave a detailed message on this number?  YES    Travel screening: Negative

## 2021-01-27 ENCOUNTER — TELEPHONE (OUTPATIENT)
Dept: FAMILY MEDICINE | Facility: CLINIC | Age: 71
End: 2021-01-27

## 2021-01-27 DIAGNOSIS — G47.01 INSOMNIA DUE TO MEDICAL CONDITION: ICD-10-CM

## 2021-01-27 DIAGNOSIS — M25.50 POLYARTHRALGIA: ICD-10-CM

## 2021-01-27 RX ORDER — ACETAMINOPHEN 325 MG/1
650 TABLET ORAL EVERY 8 HOURS PRN
Qty: 168 TABLET | Refills: 2 | Status: SHIPPED | OUTPATIENT
Start: 2021-01-27 | End: 2021-01-29

## 2021-01-27 RX ORDER — LACTOSE-REDUCED FOOD/FIBER 0.07 G-1.5
1 LIQUID (ML) ORAL
Qty: 90 CAN | Refills: 11 | Status: SHIPPED | OUTPATIENT
Start: 2021-01-27 | End: 2021-05-07

## 2021-01-27 RX ORDER — SENNOSIDES 8.8 MG/5ML
LIQUID ORAL
Qty: 150 ML | Refills: 11 | Status: SHIPPED | OUTPATIENT
Start: 2021-01-27

## 2021-01-27 RX ORDER — NORTRIPTYLINE HYDROCHLORIDE 10 MG/5ML
10 SOLUTION ORAL AT BEDTIME
Qty: 150 ML | Refills: 10 | Status: SHIPPED | OUTPATIENT
Start: 2021-01-27

## 2021-01-27 NOTE — TELEPHONE ENCOUNTER
"Bisi needing orders, patient has aphasia, is NPO.   Patient currently has orders for Acetamenophen PO. Needing new prescription with directions to state \"per peg tube\"  Also needs new prescription for Nortriptyline directions stating \"per peg tube\"    Needing orders for flushing peg tubes with admintistering of medications     Needing orders for liquids or flushes via peg tubes with meals    Would like most recent office visit notes faxed [phone]      Taylor MARTEN, RN, CPN               "

## 2021-01-27 NOTE — TELEPHONE ENCOUNTER
Form is signed and labs are attached and sent down the dumbwaiter for Chep to pickup.  Jimmie Valadez,  For 1st Floor Primary Care

## 2021-01-27 NOTE — TELEPHONE ENCOUNTER
Form is received and put in Dr. Mckinley's red folder to address.  Jimmie Valadez,  For 1st Floor Primary Care

## 2021-01-28 NOTE — TELEPHONE ENCOUNTER
Please call Yossi to discuss, their are many questions regarding patient's feedings and flushing. Also they need the order in writing and faxed to [facility] at fax # [phone].  Jimmie Valadez,  For 1st Floor Primary Care    Clinic notes, labs and Xrays printed and faxed.  Jimmie Valadez,  For [facility] Floor Primary Care    Written rx for nutritional supplement faxed to [facility] at fax # [phone].  Phone for [facility] is [phone].  Jimmie Valadez,  For 1st Floor Primary Care

## 2021-01-29 ENCOUNTER — TELEPHONE (OUTPATIENT)
Dept: FAMILY MEDICINE | Facility: CLINIC | Age: 71
End: 2021-01-29

## 2021-01-29 DIAGNOSIS — J69.0 ASPIRATION PNEUMONITIS (H): Primary | ICD-10-CM

## 2021-01-29 PROBLEM — G40.909 SEIZURE DISORDER (H): Status: RESOLVED | Noted: 2018-12-07 | Resolved: 2021-01-29

## 2021-01-29 NOTE — TELEPHONE ENCOUNTER
Please re-fax modified clinic note last 1/19/2021, including After Visit Summary that contains instructions regarding patient's PEG Tube.   104.3

## 2021-01-29 NOTE — TELEPHONE ENCOUNTER
Order History  Outpatient  Date/Time Action Taken User Additional Information   01/29/21 1251 Eric Gama MD    Outpatient Medication Detail     Disp Refills Start End BETSEY   amoxicillin-clavulanate (AUGMENTIN) 875-125 MG tablet 20 tablet 2 1/29/2021  --   Sig - Route: 1 tablet by Per Feeding Tube route 2 times daily - Per Feeding Tube   Sent to pharmacy as: Amoxicillin-Pot Clavulanate 875-125 MG Oral Tablet (AUGMENTIN)   Class: E-Prescribe   Order: [id number]   E-Prescribing Status: Sent to pharmacy (1/29/2021 12:51 PM CST)   Printout Tracking    External Result Report   Pharmacy    [facility] [id number] - [place] - [address]

## 2021-01-29 NOTE — TELEPHONE ENCOUNTER
pharmacy calling to report the family states at last visit, Dr Mckinley was going to send in script for antibiotic. Unable to locate that order.    Please call pharmacy.

## 2021-01-29 NOTE — TELEPHONE ENCOUNTER
Star pharmacist from [facility] calling regarding the prescription below, the prescription needs to say per peg tube. Please resend with the corrected sig.  Jimmie Valadez,  For 1st Floor Primary Care

## 2021-01-29 NOTE — TELEPHONE ENCOUNTER
Order History  Outpatient  Date/Time Action Taken User Additional Information   01/29/21 1112 Eric Gama MD    Outpatient Medication Detail     Disp Refills Start End BETSEY   amoxicillin-clavulanate (AUGMENTIN) 875-125 MG tablet 20 tablet 1 1/29/2021  --   Sig - Route: Take 1 tablet by mouth 2 times daily - Oral   Sent to pharmacy as: Amoxicillin-Pot Clavulanate 875-125 MG Oral Tablet (AUGMENTIN)   Class: E-Prescribe   Order: [id number]   E-Prescribing Status: Sent to pharmacy (1/29/2021 11:12 AM CST)   Printout Tracking    External Result Report   Pharmacy    [facility] [id number] - [place], [place] - [address]

## 2021-02-04 ENCOUNTER — TELEPHONE (OUTPATIENT)
Dept: FAMILY MEDICINE | Facility: CLINIC | Age: 71
End: 2021-02-04

## 2021-02-04 DIAGNOSIS — E03.9 ACQUIRED HYPOTHYROIDISM: ICD-10-CM

## 2021-02-04 RX ORDER — LEVOTHYROXINE SODIUM 25 UG/1
TABLET ORAL
Qty: 90 TABLET | Refills: 2 | Status: SHIPPED | OUTPATIENT
Start: 2021-02-04

## 2021-02-05 ENCOUNTER — TELEPHONE (OUTPATIENT)
Dept: FAMILY MEDICINE | Facility: CLINIC | Age: 71
End: 2021-02-05

## 2021-02-05 DIAGNOSIS — J69.0 ASPIRATION PNEUMONITIS (H): ICD-10-CM

## 2021-02-05 RX ORDER — IPRATROPIUM BROMIDE AND ALBUTEROL SULFATE 2.5; .5 MG/3ML; MG/3ML
SOLUTION RESPIRATORY (INHALATION)
Qty: 360 ML | Refills: 11 | Status: SHIPPED | OUTPATIENT
Start: 2021-02-05 | End: 2021-02-05

## 2021-02-05 RX ORDER — IPRATROPIUM BROMIDE AND ALBUTEROL SULFATE 2.5; .5 MG/3ML; MG/3ML
SOLUTION RESPIRATORY (INHALATION)
Qty: 360 ML | Refills: 11 | Status: SHIPPED | OUTPATIENT
Start: 2021-02-05

## 2021-02-05 NOTE — TELEPHONE ENCOUNTER
Order History  Outpatient  Date/Time Action Taken User Additional Information   02/05/21 1612 Sign Eric Mckinley MD Reorder from Order:[id number]   02/05/21 1612 Taking Flag Checked Eric Mckinley MD    Outpatient Medication Detail     Disp Refills Start End BETSEY   ipratropium - albuterol 0.5 mg/2.5 mg/3 mL (DUONEB) 0.5-2.5 (3) MG/3ML neb solution 360 mL 11 2/5/2021  No   Sig: GIVE ONE VIAL THROUGH NEBULIZER FOUR TIMES A DAY FOR COPD   Sent to pharmacy as: Ipratropium-Albuterol 0.5-2.5 (3) MG/3ML Inhalation Solution (DUONEB)   Class: E-Prescribe   Order: [id number]   E-Prescribing Status: Transmission to pharmacy in progress (2/5/2021  4:13 PM CST)   Printout Tracking    External Result Report   Medication Administration Instructions    GIVE ONE VIAL THROUGH NEBULIZER FOUR TIMES A DAY FOR COPD   Pharmacy    [id number] - [place], [place] - [address] [facility] [address]

## 2021-02-05 NOTE — TELEPHONE ENCOUNTER
[facility] pharmacy calling as they can't fill albuterol because medicare can't be billed.   Requesting albuterol be sent to [facility] in [place] [address].  The new prescription needs to be signed.    Please sign standing order.    Brenda Banks RN

## 2021-02-08 ENCOUNTER — AMBULATORY - HEALTHEAST (OUTPATIENT)
Dept: OTHER | Facility: CLINIC | Age: 71
End: 2021-02-08

## 2021-02-08 ENCOUNTER — DOCUMENTATION ONLY (OUTPATIENT)
Dept: OTHER | Facility: CLINIC | Age: 71
End: 2021-02-08

## 2021-02-08 ENCOUNTER — MEDICAL CORRESPONDENCE (OUTPATIENT)
Dept: HEALTH INFORMATION MANAGEMENT | Facility: CLINIC | Age: 71
End: 2021-02-08

## 2021-02-09 ENCOUNTER — TELEPHONE (OUTPATIENT)
Dept: FAMILY MEDICINE | Facility: CLINIC | Age: 71
End: 2021-02-09

## 2021-02-09 NOTE — TELEPHONE ENCOUNTER
.Reason for call:  Form   Our goal is to have forms completed within 72 hours, however some forms may require a visit or additional information.     Who is the form from? Patient  Where did the form come from? Patient or family brought in     What clinic location was the form placed at? [facility]  Where was the form placed? dumaiter Box/Folder  What number is listed as a contact on the form? [phone]    Phone call message - patient request for a letter, form or note:     Date needed: as soon as possible  Patient will  at the clinic when completed  Has the patient signed a consent form for release of information? YES    Additional comments: Chep will  at the clinic. Please call her at [phone]    Type of letter, form or note: group home  paperwork    Phone number to reach patient:  Home number on file [phone] (home)    Best Time:  anytime    Can we leave a detailed message on this number?  YES    Travel screening: Negative

## 2021-02-11 ENCOUNTER — TELEPHONE (OUTPATIENT)
Dept: FAMILY MEDICINE | Facility: CLINIC | Age: 71
End: 2021-02-11

## 2021-02-11 DIAGNOSIS — K52.1 ANTIBIOTIC-ASSOCIATED DIARRHEA: Primary | ICD-10-CM

## 2021-02-11 DIAGNOSIS — T36.95XA ANTIBIOTIC-ASSOCIATED DIARRHEA: Primary | ICD-10-CM

## 2021-02-11 NOTE — TELEPHONE ENCOUNTER
Nurse calling to see if Gaudencio needs Amoxicillin along with the daily Augmentin. If so, can a probiotic be ordered as patient got diarrhea badly when he started the Augmentin and nurse is concerned that adding another antibiotic will create this problem again. Please advise.       Sheree Velasquez RN, BSN, PHN

## 2021-02-12 RX ORDER — LACTOBACILLUS RHAMNOSUS GG 10B CELL
1 CAPSULE ORAL DAILY
Qty: 30 CAPSULE | Refills: 2 | Status: SHIPPED | OUTPATIENT
Start: 2021-02-12

## 2021-02-12 NOTE — TELEPHONE ENCOUNTER
Nurse is just wondering if due to ongoing antibiotic use if a probiotic is needed to help prevent diarrhea. She was under the impression that patient had both Augmentin and amoxicillin prescribed by Dr. Mckinley; reviewing the medication list patient is only on the Augmentin.       Sheree Velasquez RN, BSN, PHN

## 2021-02-12 NOTE — TELEPHONE ENCOUNTER
I agree that Dick requires a probiotic while taking antibiotics.    Notify patient's nurse I sent Rx for Culturelle to [facility].

## 2021-02-24 ENCOUNTER — NURSE TRIAGE (OUTPATIENT)
Dept: NURSING | Facility: CLINIC | Age: 71
End: 2021-02-24

## 2021-02-24 NOTE — TELEPHONE ENCOUNTER
1) Agree to increased tube feedings, as requested by patient's RN at group home (2 cans every morning, 1 1/2 cans during the midday and 1 can every evening.    2) Recommend follow-up visit on March 3, 2021 (please schedule appointment). No need to refill antibiotics (aspiration pneumonitis) until seen by this provider.    3) Patient is overdue for TDAP immunization. It can be administered during follow-up appointment (refer to #2).

## 2021-02-24 NOTE — TELEPHONE ENCOUNTER
Isis, nurse from [facility], [place] in [place], [phone] called with information and requests.    Dick's tube feedings were cut in half because of weight gain. He's now getting 1 can three times daily.  He also gets two 500 ml bottles of water per day.    It appears that Dick is losing weight.  Dick has also begun to steal food again.  They believe he's been eating the food he steals. Bananas and someone elses pancakes. Of of course his cough worsens after eating. He denies it, said he throws it away but it's never been found.    [facility] is asking if he can have more in his feedings.  The request is for:   2 cans in the morning  1 1/2 half cans mid day  1 can in the evening.    Had a visit with an eye doctor.  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 He is not a good candidate for surgery.  The group [facility] doesn't think the family knows.    Isis also asked if there are any immunizations that Dick is due for.  She also said he is nearly finished with his antibiotic.  Does he need a follow up visit once the antibiotic is complete?  His cough doesn't sound any better.    COVID 19 Nurse Triage Plan/Patient Instructions    Please be aware that novel coronavirus (COVID-19) may be circulating in the community. If you develop symptoms such as fever, cough, or SOB or if you have concerns about the presence of another infection including coronavirus (COVID-19), please contact your health care provider or visit [email or web address].     Disposition/Instructions    Home care recommended. Follow home care protocol based instructions.    Thank you for taking steps to prevent the spread of this virus.  o Limit your contact with others.  o Wear a simple mask to cover your cough.  o Wash your hands well and often.    Resources    [facility]: About COVID-19: [email or web address]    CDC: What to Do If You're Sick: www.cdc.gov/coronavirus/2019-ncov/about/steps-when-sick.html    CDC: Ending  Home Isolation: www.cdc.gov/coronavirus/2019-ncov/hcp/disposition-in-home-patients.html     CDC: Caring for Someone: www.cdc.gov/coronavirus/2019-ncov/if-you-are-sick/care-for-someone.html     [facility]: Interim Guidance for Hospital Discharge to Home: www.[facility].[place]/diseases/coronavirus/hcp/hospdischarge.pdf    [facility] clinical trials (COVID-19 research studies): clinicalaffairs.[facility]-clinical-trials     Below are the COVID-19 hotlines at the [place] Department of Health ([facility]). Interpreters are available.   o For health questions: Call 555-232-4353 or 1-245.387.6202 (7 a.m. to 7 p.m.)  o For questions about schools and childcare: Call 613-677-1895 or 1-603.157.3965 (7 a.m. to 7 p.m.)     Additional Information    Nursing judgment    Protocols used: INFORMATION ONLY CALL - NO TRIAGE-A-OH    Routed: P [address] [facility] [address] Elías HSU, RN [facility]

## 2021-02-25 ENCOUNTER — TELEPHONE (OUTPATIENT)
Dept: FAMILY MEDICINE | Facility: CLINIC | Age: 71
End: 2021-02-25

## 2021-02-25 NOTE — TELEPHONE ENCOUNTER
Reason for call: [place], on [address] received an Rx for the Lactobacillus-Inulin (CULTURELLE DIGESTIVE HEALTH) CAPS and was wondering if they are to fill this or [place]-please call them back     Oscar  can be reached at: [phone]    Call taken at 2:18 pm  on 2/25/2021    Gina Hernandez MA  [facility]  2nd Floor  Primary Care

## 2021-02-25 NOTE — TELEPHONE ENCOUNTER
Left detailed message with [facility]'s pharmacy that this was to be sent to [place] pharmacy.       Sheree Velasquez RN, BSN, PHN

## 2021-03-01 NOTE — TELEPHONE ENCOUNTER
This writer attempted to contact group home on 03/01/21      Reason for call inform message below and left message.      If patient calls back:   2nd floor [facility] Care Team (MA/TC) called. Inform patient that someone from the team will contact them, document that pt called and route to care team.         Anika Boykin MA

## 2021-03-04 ENCOUNTER — TELEPHONE (OUTPATIENT)
Dept: FAMILY MEDICINE | Facility: CLINIC | Age: 71
End: 2021-03-04

## 2021-03-04 ENCOUNTER — OFFICE VISIT (OUTPATIENT)
Dept: FAMILY MEDICINE | Facility: CLINIC | Age: 71
End: 2021-03-04
Payer: MEDICARE

## 2021-03-04 ENCOUNTER — ANCILLARY PROCEDURE (OUTPATIENT)
Dept: GENERAL RADIOLOGY | Facility: CLINIC | Age: 71
End: 2021-03-04
Attending: INTERNAL MEDICINE
Payer: MEDICARE

## 2021-03-04 VITALS
HEART RATE: 63 BPM | BODY MASS INDEX: 25.16 KG/M2 | OXYGEN SATURATION: 98 % | WEIGHT: 151 LBS | DIASTOLIC BLOOD PRESSURE: 71 MMHG | SYSTOLIC BLOOD PRESSURE: 111 MMHG | HEIGHT: 65 IN

## 2021-03-04 DIAGNOSIS — K52.1 ANTIBIOTIC-ASSOCIATED DIARRHEA: ICD-10-CM

## 2021-03-04 DIAGNOSIS — R07.81 RIB PAIN ON RIGHT SIDE: ICD-10-CM

## 2021-03-04 DIAGNOSIS — T36.95XA ANTIBIOTIC-ASSOCIATED DIARRHEA: ICD-10-CM

## 2021-03-04 DIAGNOSIS — R13.12 OROPHARYNGEAL DYSPHAGIA: ICD-10-CM

## 2021-03-04 DIAGNOSIS — J69.0 ASPIRATION PNEUMONITIS (H): Primary | ICD-10-CM

## 2021-03-04 LAB
ANION GAP SERPL CALCULATED.3IONS-SCNC: 6 MMOL/L (ref 3–14)
BASOPHILS # BLD AUTO: 0 10E9/L (ref 0–0.2)
BASOPHILS NFR BLD AUTO: 0.5 %
BUN SERPL-MCNC: 21 MG/DL (ref 7–30)
CALCIUM SERPL-MCNC: 8.8 MG/DL (ref 8.5–10.1)
CHLORIDE SERPL-SCNC: 104 MMOL/L (ref 94–109)
CO2 SERPL-SCNC: 26 MMOL/L (ref 20–32)
CREAT SERPL-MCNC: 0.42 MG/DL (ref 0.66–1.25)
DIFFERENTIAL METHOD BLD: ABNORMAL
EOSINOPHIL # BLD AUTO: 0.7 10E9/L (ref 0–0.7)
EOSINOPHIL NFR BLD AUTO: 8.9 %
ERYTHROCYTE [DISTWIDTH] IN BLOOD BY AUTOMATED COUNT: 13.5 % (ref 10–15)
GFR SERPL CREATININE-BSD FRML MDRD: >90 ML/MIN/{1.73_M2}
GLUCOSE SERPL-MCNC: 115 MG/DL (ref 70–99)
HCT VFR BLD AUTO: 39.3 % (ref 40–53)
HGB BLD-MCNC: 12.9 G/DL (ref 13.3–17.7)
LYMPHOCYTES # BLD AUTO: 3.7 10E9/L (ref 0.8–5.3)
LYMPHOCYTES NFR BLD AUTO: 45.5 %
MCH RBC QN AUTO: 33 PG (ref 26.5–33)
MCHC RBC AUTO-ENTMCNC: 32.8 G/DL (ref 31.5–36.5)
MCV RBC AUTO: 101 FL (ref 78–100)
MONOCYTES # BLD AUTO: 0.7 10E9/L (ref 0–1.3)
MONOCYTES NFR BLD AUTO: 9 %
NEUTROPHILS # BLD AUTO: 2.9 10E9/L (ref 1.6–8.3)
NEUTROPHILS NFR BLD AUTO: 36.1 %
PLATELET # BLD AUTO: 149 10E9/L (ref 150–450)
POTASSIUM SERPL-SCNC: 4 MMOL/L (ref 3.4–5.3)
RBC # BLD AUTO: 3.91 10E12/L (ref 4.4–5.9)
SODIUM SERPL-SCNC: 136 MMOL/L (ref 133–144)
WBC # BLD AUTO: 8.1 10E9/L (ref 4–11)

## 2021-03-04 PROCEDURE — 80048 BASIC METABOLIC PNL TOTAL CA: CPT | Performed by: INTERNAL MEDICINE

## 2021-03-04 PROCEDURE — 36415 COLL VENOUS BLD VENIPUNCTURE: CPT | Performed by: INTERNAL MEDICINE

## 2021-03-04 PROCEDURE — 99214 OFFICE O/P EST MOD 30 MIN: CPT | Performed by: INTERNAL MEDICINE

## 2021-03-04 PROCEDURE — 85025 COMPLETE CBC W/AUTO DIFF WBC: CPT | Performed by: INTERNAL MEDICINE

## 2021-03-04 PROCEDURE — 71101 X-RAY EXAM UNILAT RIBS/CHEST: CPT | Mod: RT | Performed by: RADIOLOGY

## 2021-03-04 RX ORDER — METRONIDAZOLE 500 MG/1
500 TABLET ORAL 3 TIMES DAILY
Qty: 10 TABLET | Refills: 0 | Status: SHIPPED | OUTPATIENT
Start: 2021-03-04 | End: 2021-03-14

## 2021-03-04 ASSESSMENT — PAIN SCALES - GENERAL: PAINLEVEL: NO PAIN (0)

## 2021-03-04 ASSESSMENT — MIFFLIN-ST. JEOR: SCORE: 1366.81

## 2021-03-04 NOTE — PROGRESS NOTES
[facility] Internal Medicine Progress Note           Assessment and Plan:   1. Aspiration pneumonitis (H)    - XR Ribs & Chest Right G/E 3 Views    2. Major neurocognitive disorder due to traumatic brain injury, sequela (H)  Basis for dysphagia, leading to condition #1.    3. Antibiotic-associated diarrhea    - Clostridium difficile Toxin B PCR; Future  - metroNIDAZOLE (FLAGYL) 500 MG tablet; 1 tablet (500 mg) by Per Feeding Tube route 3 times daily for 10 days May crush tablets and give with 30 ml of water per PEG tube.  Dispense: 10 tablet; Refill: 0    4. Rib pain on right side    - Basic metabolic panel  (Ca, Cl, CO2, Creat, Gluc, K, Na, BUN)  - CBC with platelets and differential  - XR Ribs & Chest Right G/E 3 Views    5. Oropharyngeal dysphagia  Secondary to condition #2, leading to dependence on PEG tube feedings.  Adjust tube feedings to 1 can QID, consisting of Isosource 1.5 magalis or total caloric intake of 1500 kcal per day).           Interval History:        This is a 70 year old male who comes in today for multiple medical reasons. Patient is accompanied today by his caregiver, CATALINA Arreola. He has history of traumatic brain injury, with complications such as neurocognitive disorder and chronic pulmonary aspiration due to oropharyngeal  dysphagia. Since his last clinic visit, patient was treated with Augmentin for aspiration pneumonitis. Prior to treatment with Augmentin, Dick was complaining of productive cough, associated with expiratory rhonchi on both lungs and chest x-ray findings of increased marking at both lung bases. However, he is now complaining of diarrhea, associated with generalized weakness and falls (injuring his right ribs).        Regarding his nutritional status, Mr. Avila is dependent on his PEG tube, given by gravit. . He is currently on Isosource 1.5 magalis (375 kcal/can) three times a day. The nursing staff has observed behaviors such as sneaking food items and ingesting them  "orally, which leads to his aspiration pneumonitis. It was noted that the patient remains \"hungry\" and that the current orders of TID PEG tube feeding is insufficient (1121 kcal/day).  ALT last 10/23/2020 shows mildly abnormal ALT, albumin of 3.5, total protein of 8.1 and sodium 140. Serum creatinine is low at 0.47 due to low muscle mass.             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)     Aspiration pneumonitis (H)              Review of Systems:   CONSTITUTIONAL: NEGATIVE for fever, chills, change in weight  INTEGUMENTARY/SKIN: NEGATIVE for worrisome rashes, moles or lesions  EYES: NEGATIVE for vision changes or irritation  ENT/MOUTH: NEGATIVE for ear, mouth and throat problems  RESP:NEGATIVE for hemoptysis and pleurisy  CV: NEGATIVE for chest pain, palpitations or peripheral edema  GI: NEGATIVE for hematemesis and hematochezia  : NEGATIVE for frequency, dysuria, or hematuria  MUSCULOSKELETAL: NEGATIVE for significant arthralgias or myalgia  NEURO: NEGATIVE for loss of consciousness  ENDOCRINE: NEGATIVE for temperature intolerance, skin/hair changes  HEME: NEGATIVE for bleeding problems  PSYCHIATRIC: NEGATIVE for changes in mood or affect            Medications:     Current Outpatient Medications   Medication Sig     acetaminophen (TYLENOL) 32 mg/mL liquid 15 mLs (480 mg) by Per PEG tube route every 8 hours as needed for fever or pain     amoxicillin-clavulanate " "(AUGMENTIN) 875-125 MG tablet 1 tablet by Per Feeding Tube route 2 times daily     atenolol (TENORMIN) 50 MG tablet TAKE 1 TABLET PER PEG TUBE ONCE EVERY MORNING     atorvastatin (LIPITOR) 10 MG tablet TAKE 1 TABLET PER PEG TUBE AT BEDTIME     ipratropium - albuterol 0.5 mg/2.5 mg/3 mL (DUONEB) 0.5-2.5 (3) MG/3ML neb solution GIVE ONE VIAL THROUGH NEBULIZER FOUR TIMES A DAY FOR COPD     Lactobacillus-Inulin ([facility] DIGESTIVE [facility]) CAPS Take 1 capsule by mouth daily While patient is taking antibiotics.     levothyroxine (SYNTHROID/LEVOTHROID) 25 MCG tablet TAKE 1 TABLET PER PEG TUBE EVERY MORNING     melatonin 1 MG TABS tablet Take 1 mg by mouth nightly as needed for sleep     nortriptyline (PAMELOR) 10 MG/5ML solution 5 mLs (10 mg) by Per PEG tube route At Bedtime     Nutritional Supplements (ISOSOURCE 1.5 BRYAN) LIQD Take 1 Can by mouth 3 times daily (with meals)     order for DME Equipment being ordered: PEG tube dressing. Change dressing daily or as needed.     Sennosides (SENNA) 8.8 MG/5ML SYRP GIVE 5 ML PER PEG TUBE EVERY MORNING FOR CONSTIPATION     Valproate Sodium (VALPROIC ACID) 250 MG/5ML GIVE 10 ML PER PEG TUBE TWICE A DAY     No current facility-administered medications for this visit.              Physical Exam:   /71 (BP Location: Left arm, Patient Position: Chair, Cuff Size: Adult Regular)   Pulse 63   Ht 1.651 m (5' 5\")   Wt 68.5 kg (151 lb)   SpO2 98%   BMI 25.13 kg/m    Constitutional: Awake, alert, cooperative, no apparent distress, and appears stated age.  Eyes: Lids and lashes normal, pupils equal, round and reactive to light, extra ocular muscles intact, sclera clear, conjunctiva normal.  ENT: Normocephalic, without obvious abnormality, atramatic, sinuses nontender on palpation, external ears without lesions, oral pharynx with moist mucus membranes, tonsils without erythema or exudates, gums normal and good dentition.  Lungs: No increased work of breathing, good air exchange, " clear to auscultation bilaterally, no crackles or wheezing.  Cardiovascular: Regular rate and rhythm, normal S1 and S2, no S3 or S4, and no murmur noted.  Abdomen: No scars, normal bowel sounds, soft, non-distended, non-tender, no masses palpated, no hepatosplenomegally.  Musculoskeletal: No redness, warmth, or swelling of the joints.  Full range of motion noted.  Motor strength is 5 out of 5 all extremities bilaterally.  Tone is normal.  Neurologic: Awake, alert, oriented to name, place and time.  Cranial nerves II-XII are grossly intact.  Motor is 5 out of 5 bilaterally.  Cerebellar finger to nose, heel to shin intact.  Sensory is intact.  Babinski down going, Romberg negative, and gait is normal.  Neuropsychiatric: Normal affect, mood, orientation, memory and insight.  Skin: No rashes, erythema, pallor, petechia or purpura.          Data:   All laboratory and imaging data in the past 24 hours reviewed    Disposition:  Follow-up in 4 weeks.    Eric Mckinley MD  Internal Medicine  [facility] Team

## 2021-03-04 NOTE — PATIENT INSTRUCTIONS
At [facility], we strive to deliver an exceptional experience to you, every time we see you. If you receive a survey, please complete it as we do value your feedback.  If you have MyChart, you can expect to receive results automatically within 24 hours of their completion.  Your provider will send a note interpreting your results as well.   If you do not have MyChart, you should receive your results in about a week by mail.    Your care team:                            Family Medicine Internal Medicine   MD Eric Romero MD Shantel Branch-Fleming, MD Srinivasa Vaka, MD Katya Belousova, PADACIA Joseph, APRN CNP    Robbin Bustamante, MD Pediatrics   Haseeb Esqiuvel, PADACIA Coreas, CNP MD Briana Marcano APRN CNP   MD Hyacinth Ambrose MD Deborah Mielke, MD Macey Perez, APRN [facility]      Clinic hours: Monday - Thursday 7 am-6 pm; Fridays 7 am-5 pm.   Urgent care: Monday - Friday 11 am-9 pm; Saturday and Sunday 9 am-5 pm.    Clinic: [phone]       [facility]: Monday - Thursday 8 am - 7 pm; Friday 8 am - 6 pm  [facility]: [phone]     Use [email or web address] for 24/7 diagnosis and treatment of dozens of conditions.    PATIENT INSTRUCTIONS:    1) Increase tube feedings to 2 cans every morning, 2 cans every afternoon and 1 can every evening.    2) Discontinue Augmentin.

## 2021-03-04 NOTE — TELEPHONE ENCOUNTER
[facility] Pharmacist calling to clarify Metronidazole dose and quantity did not match, clarified should be 30 tablets not 10 for TID directions.     Kallie Interiano RN, BSN, CMSRN  [facility]

## 2021-03-05 RX ORDER — DOXYCYCLINE 100 MG/1
100 CAPSULE ORAL 2 TIMES DAILY
Qty: 20 CAPSULE | Refills: 0 | Status: SHIPPED | OUTPATIENT
Start: 2021-03-05 | End: 2021-03-08

## 2021-03-08 ENCOUNTER — TELEPHONE (OUTPATIENT)
Dept: FAMILY MEDICINE | Facility: CLINIC | Age: 71
End: 2021-03-08

## 2021-03-08 DIAGNOSIS — J69.0 ASPIRATION PNEUMONITIS (H): ICD-10-CM

## 2021-03-08 RX ORDER — DOXYCYCLINE 100 MG/1
100 CAPSULE ORAL 2 TIMES DAILY
Qty: 20 CAPSULE | Refills: 0 | Status: SHIPPED | OUTPATIENT
Start: 2021-03-08 | End: 2021-05-07

## 2021-03-08 NOTE — TELEPHONE ENCOUNTER
"Pharmacy needing the prescription sent to them on 3/5/21 for doxycycline to state \"per PEG tube\"  Please send updated prescription to pharmacy.  Macey KEY RN  "

## 2021-03-08 NOTE — TELEPHONE ENCOUNTER
Outpatient Medication Detail     Disp Refills Start End BETSEY   doxycycline monohydrate (MONODOX) 100 MG capsule 20 capsule 0 3/8/2021  No   Sig - Route: 1 capsule (100 mg) by Per Feeding Tube route 2 times daily - Per Feeding Tube   Sent to pharmacy as: Doxycycline Monohydrate 100 MG Oral Capsule (MONODOX)   Class: E-Prescribe   Notes to Pharmacy: Per PEG Tube.   Order: [id number]   E-Prescribing Status: Sent to pharmacy (3/8/2021  8:38 AM CST)

## 2021-03-15 ENCOUNTER — TELEPHONE (OUTPATIENT)
Dept: FAMILY MEDICINE | Facility: CLINIC | Age: 71
End: 2021-03-15

## 2021-03-15 NOTE — TELEPHONE ENCOUNTER
Nataly with [employer] has taken down the correct fax number below ([phone]) and will re-fax the forms immediately.  Thank you. Dolores Ramirez R.N.

## 2021-03-15 NOTE — TELEPHONE ENCOUNTER
This writer attempted to contact Marcus on 03/15/21      Reason for call Provide correct fax #[phone] and unable to leave message.      If patient calls back:   Correct fax to send to is [phone]        Kallie Interiano RN

## 2021-03-15 NOTE — TELEPHONE ENCOUNTER
Krystyna is calling looking to get orders for Dick's enteral feeding kits, René-key tho kentension kit,  isosource formula 1.5 calories - 375 calories per can. They have faxed these forms over many times and have not received an answer. They will fax the forms over again as the patient has not formula left. They will fax them over right now to [phone].  If you are not getting these form please call Krystyna immediately as the patient has no formula left.  Last time the forms were sent was on 3/10/2021. Thank you. Dolores Ramirez R.N.    This refill/encounter is being handled by a team outside your facility.  If action needs to be taken, please route the encounter back to your team that would normally handle it. Thank you. Dolores Ramirez R.N.    Nurse for Dick is Maxwell ([phone])    Please call Krystyna and Maxwell when this is completed.

## 2021-03-16 ENCOUNTER — APPOINTMENT (OUTPATIENT)
Dept: OPTOMETRY | Facility: CLINIC | Age: 71
End: 2021-03-16
Payer: MEDICARE

## 2021-03-16 PROCEDURE — 92341 FIT SPECTACLES BIFOCAL: CPT | Performed by: OPTOMETRIST

## 2021-03-16 NOTE — TELEPHONE ENCOUNTER
Form and last clinic visit is faxed to [facility] at fax # [phone].  Jimmie Valadez,  For 1st Floor Primary Care

## 2021-03-16 NOTE — TELEPHONE ENCOUNTER
Fax was received last evening 3/16 and placed on Dr. Mckinley's bin around 530pm     Kallie Interiano RN, BSN, CMSRN  [facility]

## 2021-03-30 ENCOUNTER — TELEPHONE (OUTPATIENT)
Dept: FAMILY MEDICINE | Facility: CLINIC | Age: 71
End: 2021-03-30

## 2021-03-30 NOTE — TELEPHONE ENCOUNTER
Spoke with RNMaxwell, at group home  Patient having some diarrhea.   Onset of diarrhea - last Monday/tuesday  Has been given extra water via tube feeding  Patient is urinating normal, not dehydrated  No blood or mucous in stool  No fever  Having diarrhea 4 - 5x per day, large watery stools, no foul odor  Not weak or lethargic    Advised patient needs to be seen at this time  Was on an antibiotic, finished a week ago, this is 3rd time on antibiotic    Transferred back to scheduling for an appointment today, if no appointment today, to go to urgent care    Taylor MARTEN, RN

## 2021-04-12 ENCOUNTER — PATIENT OUTREACH (OUTPATIENT)
Dept: GERIATRIC MEDICINE | Facility: CLINIC | Age: 71
End: 2021-04-12

## 2021-04-12 NOTE — LETTER
April 12, 2021    SAUL BENNETT  [address]  [place] [address]        Dear  Saul,    Welcome to [facility] ([facility] health program. My name is Stu Cody RN. I am your MSC+ care coordinator.     I will call you soon to see how you are doing and determine what needs you may have. My job is to help connect you to services, complete an assessment, and develop a care plan with you. There is no charge to you for the care coordination and assessment services. Our goal is to keep you as healthy and independent as possible.     [facility] includes the benefits you may receive from Medical Assistance.    Soon, you will receive a new [facility] member identification (ID) card from [facility]. When you receive it, please use this card along with your [place] Health Care Programs card and Prescription Drug Coverage Program card. When you receive, it please use this card where you get your health services. If you have Medicare, you will need to show your Medicare card when you get health services.    If you have questions, please call me at [phone]. If you reach my voice mail, leave a message and your phone number. If you are hearing impaired, please call the [place] Relay at 999 or [phone] (xczweh-pa-[id number] relay service).    Sincerely,      Stu Cody RN  [phone]  [email or web address]      MSC+ [id number] DHS Approved ([id number])  [id number] (11/18)

## 2021-04-12 NOTE — PROGRESS NOTES
[facility] Care Coordination Contact    Member became effective with  [facility] on 4/1/2021 with [facility] MSC+.  Previous Health Plan: MA/Fee For Service  Previous Care System: [facility]  Previous care coordinators name and number: Keely Garcia, [facility]  [phone]  Waiver Type: EW  Last MMIS Entry: Date 11/09/20 and Type PERS  ASSMT - EW open  MMIS visit date (and type) if different from above: n/a  Services Listed in MMIS:   A3 F MNCHSE-CSP 35 F CASE MGMT 86 C 24 CL50 3  05 C DOUG DINE 06 C HOMEMAKER 62 C LAUNDRY  57 C PRSNL ASST  UTF received: Yes: Received and saved to member file       [facility], [facility] and [facility] have [place] address (I used this one for GANESH and mailing).   I used mobile phone in GANESH (same as what [facility] used)  [facility] has [address] [place] [address].      Mel Villagran  Case Management Specialist  [facility]  [phone]

## 2021-04-13 NOTE — PROGRESS NOTES
[facility] Care Coordination Contact    CC called and left a voice mail message with the group home provider Maxwell to introduced self as member's new CC assigned 4/1/21. Attempted to call Marisela VIEYRA), number listed on [place] assessment is not in service. Member lives in a group home and has authorization until 10/31/21.      Stu Cody RN Care Coordinator  [facility]  Office: [phone]  Fax: [phone]  [email or web address]

## 2021-04-15 ENCOUNTER — TELEPHONE (OUTPATIENT)
Dept: FAMILY MEDICINE | Facility: CLINIC | Age: 71
End: 2021-04-15

## 2021-04-15 NOTE — PROGRESS NOTES
[facility] Care Coordination Contact    CC received a phone call from CATALINA Arreola. She stated that member is living with her mom and is renting a room from her as the group home he was living in is not licensed with the [facility]. The are working on getting her license with the [facility]. They have been providing supports to member since March 2020. Member was paying privately for their services and now he is out of money. So they assisted him with applying with MA and EW. She is inquiring how they are able to get reimburse for their services. CC recommended for her to call her tomorrow with member to discuss.    CC called and left a voice mail message with Marisela Nugent (Niece) and also left a voice mail message with CAMRYN Riggs with the [facility] to discuss case.      Stu Cody RN Care Coordinator  [facility]  Office: [phone]  Fax: [phone]  [email or web address]

## 2021-04-15 NOTE — TELEPHONE ENCOUNTER
Maxwell RN is now calling and wanting orders for 6 feedings daily with no gravity. She notes bolus is what they have been doing.    Taylor Farris RN, [facility] Triage      
Norma from [facility] is calling to clarify feeding. Provider noted gravity feeding on forms. Medicare will now require that. The facility has been doing bolus.    She wants to know if it is ok to call and inform to do gravity feeding.    Taylor Farris RN, [facility] Triage      
76

## 2021-04-15 NOTE — PROGRESS NOTES
[facility] Care Coordination Contact    CC received a phone call from CAMRYN Riggs with [facility]. She shared that she did her inital assessment in November and member was opened to  in January 2020. At the time of the assessment in November, [facility] did not have active license with the [facility]. Member did attempted to lived at [facility] in [place] from 1/25/21 to 2/17/21. CL services ended on 2/17/21 when member move back with [facility] and her mom even though her recommendation was 24 hours supervised care. She has tried working with [facility] regarding PCA services and choosing an agency since February with no response from her. She shared it was a challenge to hear member over the phone during the assessment. She also tried reaching out to Marisela (niece) via email or phone with no response either. Currently,  does not have any services in place.       Stu Cody RN Care Coordinator  [facility]  Office: [phone]  Fax: [phone]  [email or web address]

## 2021-04-16 NOTE — PROGRESS NOTES
[facility] System Change (Transfer)    Member is new enrollee to [facility] effective 4/1/21 with [facility] MSC+ health plan. Member transferred from [facility] system.    No home visit required because this care coordinator (CC) has received all required documentation from the previous CC.    Writer t/c to member, introduced self as member's new CC. Confirmed with member that the welcome letter with writer's name and contact information has been received.  Reviewed LTCC/Health Risk Assessment (HRA) and POC with member. No changes noted.  Transitional HRA completed. Care Plan Summary updated and reflects current services.  Required referral authorization information communicated to CMS: Yes    Writer reviewed the following with member:    ER visits: No  Hospitalizations: No  TCU stays: No  Significant health status changes: Denies any health concern.   Falls/Injuries: No  ADL/IADL changes: No  Changes in services: No    Follow-Up Plan: Member informed of future contact, plan to f/u with member with at next regularly scheduled contact.  Contact information shared with member and family, encouraged member to call with any questions or concerns.    CC received a phone call from CATALINA Arreola Juliana (mom of Ngoziomayra) and member to discuss on supportive services for member. Member is currently living in a single family home with Faina. He is renting from her. Due to the home is not license with the State, they are not able to bill for any supportive services through his MA or EW.  Member is requesting for supportive services in his current living arrangement. CC reviewed that it was recommended at the time of the [place] assessment for member to live in a 24 hours supervised setting, member stated he wants to continue to live in this home with Faina Arreola and Vitaly providing 24 hours supervision for member. It was a challenge to hear member over the phone as he whisper. He was able to answer yes or no. At this  time, all needs are met on the main level where the shower is located in the basement which he does have supervision for safety. Member is only left alone for no more than 2-4 hours at a time. Medications are managed by Faina and Maxwell. They declined home care RN for support. They are requesting PCA, Homemaking and [employer] for member. CC provided Maxwell with [facility] to be hire as PCA/HMK to provide care, and CC will submit referral for lifeline.   Emergency contact information for lifeline: 1. Maxwell Martin [phone], 2. Faina Zuleta [phone]. 3. Riki Rodriguez [phone].     CC called [facility] ([phone]) and left a voice mail message for the referral.      Stu Cody RN Care Coordinator  [facility]  Office: [phone]  Fax: [phone]  [email or web address]         English

## 2021-04-20 ENCOUNTER — PATIENT OUTREACH (OUTPATIENT)
Dept: GERIATRIC MEDICINE | Facility: CLINIC | Age: 71
End: 2021-04-20

## 2021-04-20 NOTE — PROGRESS NOTES
TRANSITIONS OF CARE (SIDNEY) LOG   SIDNEY tasks should be completed by the CC within one (1) business day of notification of each transition. Follow up contact with member is required after return to their usual care setting.  Note:  If CC finds out about the transitions fifteen (15) days or more after the member has returned to their usual care setting, no SIDNEY log is needed. However, the CC should check in with the member to discuss the transition process, any changes needed to the care plan and document it in a case note.    Member Name:  Gaudencio Avila O Name:  [employer]anner [employer] Member ID#: [id number]   Product: MSC+ Care Coordinator Contact:  Stu Cody RN Agency/County/Care System: [place] [employer]   Transition Communication Actions from Care Management Contact   Transition #1   Notification Date: 4/20 Transition Date:  4/17/21 Transition From: Home     Is this the member s usual care setting?               yes [place] [facility]   Transition Type:  Unplanned  Reason for Admission/Comments:  4/20 [place] [facility] [place] SOB, diarrhea and vomiting brown-colored foul smelling material today. Was found to be hypoxemia in the 70s upon arrival with EMS and was intubated. Surgery consulted and ex lap was scheduled. No ischemia, proforation or obstruction found. Extubated on 4/18 and now on nasal cannula.       Shared CC contact info, care plan/services with receiving setting--Date completed: 4/20/21 CC Radha [phone], CAMRYN Mead [phone]. Left voice mail messages.    Notified PCP of transition--Date completed:  4/20/21    via  EMR   Transition #2      Notification Date: 4/22/21       Transition To:  Home  Transition Date: 4/21/21     Transition Type:    Planned  Notified PCP -- Date completed: n/a              Shared CC contact info, care plan/services with receiving setting or, if applicable, home care agency--Date completed:  n/a  *Complete additional tasks below, if this transition is  a return to usual care setting.      Comments:  See below      *Complete tasks below when the member is discharging TO their usual care setting within one (1) business day of notification.  For situations where the Care Coordinator is notified of the discharge prior to the date of discharge, the Care Coordinator must follow up with the member or designated representative to confirm that discharge actually occurred and discuss required SIDNEY tasks as outlined in the SIDNEY Instructions.  (This includes situations where it may be a  new  usual care setting for the member. (i.e., a community member who decides upon permanent nursing home placement following hospitalization and rehab).    Date completed: 4/22/21  Communicated with member or their designated representative about the following:  care transition process; about changes to the member s health status; plan of care updates; education about transitions and how to prevent unplanned transitions/readmissions  Four Pillars for Optimal Transition:    Check  Yes  - if the member, family member and/or SNF/facility staff manages the following:    If  No  provide explanation in the comments section.          [x]  Yes     []  No     Does the member have a follow-up appointment scheduled with primary care or specialist? (Mental health hospitalizations--the appt. should be w/in 7 days)  With surgery clinic on 5/5/21, will need PCP f/u in 1 month  [x]  Yes     []  No     Can the member manage their medications or is there a system in place to manage medications (e.g. home care set-up)?  Manage by Caregivers.      [x]  Yes     []  No     Can the member verbalize warning signs and symptoms to watch for and how to respond?         []  Yes     [x]  No     Does the member use a Personal Health Care Record?  Check  Yes  if visit summary, discharge summary, and/or healthcare summary are being used as a PHR.                                                                                                                                                                                     [x] Yes      [] No      Have you updated the member s care plan?  If  No  provide explanation in comments.   Comments:  4/22/21 CC called to Cheomayra, caregiver and left a voice mail message for a returned call post hospitalization follow up. Chep called and stated member is home with midline surgical incision. He has been tolerating the TF without difficulty but did had to administered miralax for constipation. She is requesting home care PT/OT. There was no order from the hospital. Will request from PCP. Provided [facility] Provide a ride for medical appts. Discussed regarding current services in place and she has called [facility] and left a voice mail message.       Stu Cody RN Care Coordinator  [facility]  Office: [phone]  Fax: [phone]  [email or web address]

## 2021-04-21 ENCOUNTER — PATIENT OUTREACH (OUTPATIENT)
Dept: GERIATRIC MEDICINE | Facility: CLINIC | Age: 71
End: 2021-04-21

## 2021-04-21 NOTE — PROGRESS NOTES
[facility] Care Coordination Contact    Online application for [employer] completed for MANISHA Cody RN Care Coordinator  [facility]  Office: [phone]  Fax: [phone]  [email or web address]

## 2021-04-21 NOTE — PROGRESS NOTES
[facility] Care Coordination Contact    [facility] [address] right faxed to [facility] for address update.      Stu Cody RN Care Coordinator  [facility]  Office: [phone]  Fax: [phone]  [email or web address]

## 2021-04-21 NOTE — PROGRESS NOTES
[facility] Care Coordination Contact    CC called to [facility] and spoke with Anna. Provided referral information for PCA/HMK for member. NPI [phone] and Fax [phone]. CC faxed PCA assessment to the provider.      Stu Cody RN Care Coordinator  [facility]  Office: [phone]  Fax: [phone]  [email or web address]

## 2021-04-22 ENCOUNTER — PATIENT OUTREACH (OUTPATIENT)
Dept: GERIATRIC MEDICINE | Facility: CLINIC | Age: 71
End: 2021-04-22

## 2021-04-22 DIAGNOSIS — Z98.890 S/P EXPLORATORY LAPAROTOMY: ICD-10-CM

## 2021-04-22 DIAGNOSIS — J96.01 ACUTE RESPIRATORY FAILURE WITH HYPOXIA (H): Primary | ICD-10-CM

## 2021-04-22 DIAGNOSIS — Z87.820 HISTORY OF TRAUMATIC BRAIN INJURY: ICD-10-CM

## 2021-04-22 NOTE — PROGRESS NOTES
[facility] Care Coordination Contact    Dr. Mckinley,    I am the Critical access hospital care coordinator for Gaudencio Avila.     I am writing to request order for home care PT/OT. Dick was admitted [place] [facility]17 to 4/21. Ex Lap was performed and home with surgical midline incision. No home care orders prior to discharge. Dick uses a walker with ambulation with low activity tolerance. Please enter order when approve.    All of my documentation can be found in EPIC. Please do not hesitate to contact me with any questions or concerns.    Thank you,    Stu Cody RN Care Coordinator  [facility]  Office: [phone]  Fax: [phone]  [email or web address]

## 2021-04-22 NOTE — PROGRESS NOTES
[facility] Care Coordination Contact    Member Signature - POC Change:  Per CC, member has made a change to their POC.  Care Plan Change Letter mailed to member for signature with a self-addressed return envelope.   Mel Villagran  Case Management Specialist  [facility]  [phone]

## 2021-04-23 NOTE — PROGRESS NOTES
[facility] Care Coordination Contact    Dr. Mckinley will need to enter home care referral for PT/OT to [place] or other home care agency.     Thank you,    Stu Cody RN Care Coordinator  [facility]  Office: [phone]  Fax: [phone]  [email or web address]

## 2021-04-27 ENCOUNTER — PATIENT OUTREACH (OUTPATIENT)
Dept: GERIATRIC MEDICINE | Facility: CLINIC | Age: 71
End: 2021-04-27

## 2021-04-27 NOTE — PROGRESS NOTES
[facility] Care Coordination Contact    CC received SA request from [employer] via email. The equipment was installed on 4/23/21. The monthly charge of $45, installation $65 and one time button purchase of $99. Sent request to CMS to submit.      Stu Cody RN Care Coordinator  [facility]  Office: [phone]  Fax: [phone]  [email or web address]

## 2021-04-27 NOTE — PROGRESS NOTES
[facility] Care Coordination Contact    CC received the following PCA auth.  SAUL BENNETT 1950 [facility] PCA: Transition   4/1/2021 10/31/2021 Medically Necessary 3210       Stu Cody RN Care Coordinator  [facility]  Office: [phone]  Fax: [phone]  [email or web address]

## 2021-04-29 ENCOUNTER — PATIENT OUTREACH (OUTPATIENT)
Dept: GERIATRIC MEDICINE | Facility: CLINIC | Age: 71
End: 2021-04-29

## 2021-04-29 DIAGNOSIS — R41.9 NEUROCOGNITIVE DISORDER: Primary | ICD-10-CM

## 2021-04-29 DIAGNOSIS — Z98.890 S/P EXPLORATORY LAPAROTOMY: ICD-10-CM

## 2021-04-29 DIAGNOSIS — J69.0 ASPIRATION PNEUMONITIS (H): ICD-10-CM

## 2021-04-29 NOTE — PROGRESS NOTES
[facility] Care Coordination Contact    Dr. Mckinley    I am the [facility] care coordinator for Gaudencio Avila.     I am writing to request the home care referral that was entered on 4/22 to have the status change to ORDERED and please note that OK to start home care the week of 5/3/21. [facility] never received the previous home care referral.    All of my documentation can be found in EPIC. Please do not hesitate to contact me with any questions or concerns.        Thank you,  Stu Cody RN Care Coordinator  [facility]  Office: [phone]  Fax: [phone]  [email or web address]

## 2021-04-29 NOTE — PROGRESS NOTES
[facility] Care Coordination Contact    CC called to Maxwell to inquired on COVID vaccine. She shared that member is [facility] as his surgical incision came open. He was squatting down. He used his lifeline and the emergency response was dispatched. She did received a call from [employer] afterward. She is requesting for lifeline to call her first and she will check on the member first. She shared that the caregiver was home but her phone was on silence. CC requested the Auto-Alert change protocol form to be mailed for completion.     CC called to [facility] on the status of home care referral. CC spoke with Faby, the referral will need to have ordered and not future. Also for the order to state, OK to start home care the week of 5/3/21. They never received the referral on 4/22/21. She will look out for the new referral.      Stu Cody RN Care Coordinator  [facility]  Office: [phone]  Fax: [phone]  [email or web address]

## 2021-04-30 ENCOUNTER — PATIENT OUTREACH (OUTPATIENT)
Dept: GERIATRIC MEDICINE | Facility: CLINIC | Age: 71
End: 2021-04-30

## 2021-04-30 NOTE — PROGRESS NOTES
[facility] Care Coordination Contact  CC received notification of Emergency Room visit.  ER [place] occurred [place] [facility] with Dx of Evaluation of surgical incision/discharge.    CC contacted member and left a message requesting a return call.  Member has a follow-up appointment with PCP: Yes: scheduled on 5/7 with Dr. Mckinley.  Member has had a change in condition: No  New referrals placed: Yes for home care PT/OT to start the week of May 3rd.   Home Visit Needed: No  Care plan reviewed and updated.  PCP notified of ED visit via EMR.      Stu Cody RN Care Coordinator  [facility]  Office: [phone]  Fax: [phone]  [email or web address]

## 2021-05-04 ENCOUNTER — MEDICAL CORRESPONDENCE (OUTPATIENT)
Dept: HEALTH INFORMATION MANAGEMENT | Facility: CLINIC | Age: 71
End: 2021-05-04

## 2021-05-04 ENCOUNTER — TELEPHONE (OUTPATIENT)
Dept: FAMILY MEDICINE | Facility: CLINIC | Age: 71
End: 2021-05-04

## 2021-05-04 NOTE — TELEPHONE ENCOUNTER
[facility] utilizes an encounter to take the place of a direct phone call to your office. Please take a moment to review the below request. Please reply or route message to author of this encounter.  Message will act as a verbal OK of orders requested below. Thank you.    ORDER  Pt seen for home PT eval visit today. Requesting to continue with home PT 1w1, 2w2, 1w1 for gait/transfer training, therapeutic ex/hep instruction. OT to eval and treat for adl safety/adaptive equipment. SLP to eval and treat for speech/dysphonia. RN to eval and treat for wound care management/med management/education.   Jorge Luis Cooper, PT  [phone]

## 2021-05-06 ENCOUNTER — TELEPHONE (OUTPATIENT)
Dept: FAMILY MEDICINE | Facility: CLINIC | Age: 71
End: 2021-05-06

## 2021-05-06 NOTE — TELEPHONE ENCOUNTER
"Per Radha; needing additional nursing orders, wound care orders, and to report fall.  Nursing:  SNV 1x/week for 8 weeks and 3 prn.  Had abdominal incision (exploratory surgery abdomen) there is one spot that is not healed yet.  Wound care order:  Cleanse daily with either soap/water, saline, or wound cleanser; cover with absorbent dressing, change daily.  Patient had fall yesterday.  Was walking without walker and landed on his buttocks.  No injuries.  Physical therapy will be working with him.  It was emphasized to use his walker with all ambulation at this time.    RN did give verbal ok for these orders; to MD to sign. Dr. Mckinley had stated on 5/4/21 \"agree to all home care order requests.\"  Macey Bishop RN    "

## 2021-05-07 ENCOUNTER — OFFICE VISIT (OUTPATIENT)
Dept: FAMILY MEDICINE | Facility: CLINIC | Age: 71
End: 2021-05-07
Payer: COMMERCIAL

## 2021-05-07 ENCOUNTER — ANCILLARY PROCEDURE (OUTPATIENT)
Dept: GENERAL RADIOLOGY | Facility: CLINIC | Age: 71
End: 2021-05-07
Attending: INTERNAL MEDICINE
Payer: COMMERCIAL

## 2021-05-07 VITALS
SYSTOLIC BLOOD PRESSURE: 117 MMHG | DIASTOLIC BLOOD PRESSURE: 75 MMHG | RESPIRATION RATE: 12 BRPM | OXYGEN SATURATION: 96 % | HEART RATE: 63 BPM | TEMPERATURE: 97.8 F

## 2021-05-07 DIAGNOSIS — Z93.1 GASTROSTOMY TUBE DEPENDENT (H): ICD-10-CM

## 2021-05-07 DIAGNOSIS — K63.89 PNEUMATOSIS INTESTINALIS: ICD-10-CM

## 2021-05-07 DIAGNOSIS — Z87.09 HX OF ASPIRATION PNEUMONITIS: Primary | ICD-10-CM

## 2021-05-07 PROCEDURE — 71045 X-RAY EXAM CHEST 1 VIEW: CPT | Performed by: RADIOLOGY

## 2021-05-07 PROCEDURE — 74019 RADEX ABDOMEN 2 VIEWS: CPT | Performed by: RADIOLOGY

## 2021-05-07 PROCEDURE — 99213 OFFICE O/P EST LOW 20 MIN: CPT | Performed by: INTERNAL MEDICINE

## 2021-05-07 RX ORDER — LACTOSE-REDUCED FOOD/FIBER 0.07 G-1.5
1 LIQUID (ML) ORAL 4 TIMES DAILY
Qty: 30000 ML | Refills: 5 | Status: SHIPPED | OUTPATIENT
Start: 2021-05-07

## 2021-05-07 NOTE — PROGRESS NOTES
[facility] Internal Medicine Progress Note           Assessment and Plan:   1. Hx of aspiration pneumonitis  Chest x-rays shows near-resolution of left lower lobe infiltrates, consistent with lung auscultation. Repeat x-trupti during each visit.  - XR Chest 1 View    2. Hx of pneumatosis intestinalis, S/P exploratory laparotomy  X-rays of the abdomen shows no bowel obstruction. Recommend repeat x-rays during each outpatient visit.  - XR KUB    3. Gastrostomy tube dependent (H)  - Nutritional Supplements (ISOSOURCE 1.5 BRYAN) LIQD; Take 1 Can by mouth 4 times daily Give one can four times a day by bolus via gastrostomy tube. RN in charge of patient is requesting bolus feeding of 6 times a day (!), but patient only wants QID. Suspected cause of intestinalis pneumatosiis.   Dispense: 33306 mL; Refill: 5           Interval History:     Dick is a 71 year old who presents for the following health issues       Hospital Follow-up Visit:    Hospital/Nursing Home/ Rehab Facility: [facility]  Date of Admission: 4/17/2021  Date of Discharge: 4/21/2021  Reason(s) for Admission: acute hypoxemic respiratory failure      Was your hospitalization related to COVID-19? No   Problems taking medications regularly:  None  Medication changes since discharge: None  Problems adhering to non-medication therapy:  None    Summary of hospitalization:  CareEverywhere information obtained and reviewed  Diagnostic Tests/Treatments reviewed.  Follow up needed: Yes.  Other Healthcare Providers Involved in Patient s Care:         Homecare and Care Coordination  Update since discharge: stable.   Post Discharge Medication Reconciliation: discharge medications reconciled, continue medications without change.  Plan of care communicated with patient and caregiver. Patient's RN is requesting adjustment of G-tube feeding boluses to 6x/day.    Prior to hospitalization, patient received tube feeding. Dick suddenly complained of being sick and food coming  "out of his mouth with audible wheezes. In the ER, his RN states that a \"lot of food shot out of his G-tube\".                     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  RESP:NEGATIVE for hemoptysis, pleurisy and SOB/dyspnea  CV: NEGATIVE for chest pain, palpitations or peripheral edema  GI: NEGATIVE for hematemesis, hematochezia, jaundice and melena  : NEGATIVE for frequency, dysuria, or hematuria  MUSCULOSKELETAL: NEGATIVE for significant arthralgias or myalgia  NEURO: POSITIVE for hx of traumatic brain injury  ENDOCRINE: NEGATIVE for temperature intolerance, skin/hair changes  HEME: NEGATIVE for bleeding problems  PSYCHIATRIC: NEGATIVE for changes in mood or affect              Physical Exam:   /75 (BP Location: Left arm, Patient Position: Sitting, Cuff Size: Adult Regular)   Pulse 63   Temp 97.8  F (36.6  C) (Tympanic)   Resp 12   SpO2 96%   Constitutional: fatigued, alert, cooperative, mild distress and appears stated age  Eyes: extra-ocular muscles intact and sclera clear  ENT: normocepalic, without obvious abnormality  Lungs: Symmetrical chest expansion with minimal " inspiratory crackles bilaterally.  Cardiovascular: regular rate and rhythm and normal S1 and S2  Abdomen: Not distended. PEG tube in place. Minimal greenish drainage noted from midline incision wound (S/P exploratory laparatomy).  Musculoskeletal: No redness, warmth, or swelling of the joints.  Full range of motion noted.  Motor strength is 5 out of 5 all extremities bilaterally.  Tone is normal.  Neurologic: Mental Status Exam:  Level of Alertness:   awake  Orientation:   person, place  Memory:   abnormal   Neuropsychiatric: Affect: flat  Skin: As above.          Data:   ABDOMEN ONE VIEW  5/7/2021 12:25 PM      HISTORY: Pneumatosis intestinalis.     COMPARISON: 1/19/2021.                                                                      IMPRESSION: A gastrostomy tube is in place. The visualized bowel gas  pattern is otherwise within normal limits. Previously described 0.6 cm  calcification projected over the right upper quadrant is unchanged,  and may again represent a gallstone.     GERTRUDE MORA MD    CHEST ONE VIEW  5/7/2021 12:10 PM      HISTORY:  Follow-up for aspiration pneumonitis.     COMPARISON: 3/4/2021.                                                                      IMPRESSION: Previously noted left basilar infiltrate has improved  slightly since the previous exam. Mild scarring and/or atelectasis at  the right lung base and left midlung, unchanged. No pneumothorax.  Heart size and pulmonary vascularity are within normal limits. Old  right rib fractures are again noted.     GERTRUDE MORA MD     Disposition:  Follow-up in 4 weeks.    Eric Mckinley MD  Internal Medicine  [facility] Team

## 2021-05-07 NOTE — PATIENT INSTRUCTIONS
At [facility], we strive to deliver an exceptional experience to you, every time we see you. If you receive a survey, please complete it as we do value your feedback.  If you have MyChart, you can expect to receive results automatically within 24 hours of their completion.  Your provider will send a note interpreting your results as well.   If you do not have MyChart, you should receive your results in about a week by mail.    Your care team:                            Family Medicine Internal Medicine   MD Eric Romero MD Shantel Branch-Fleming, MD Srinivasa Vaka, MD Katya Belousova, PADACIA Joseph, APRN CNP    Robbin Bustamante, MD Pediatrics   Haseeb Esquivel, PADACIA Coreas, CNP MD Briana Marcano APRN CNP   MD Hyacinth Ambrose MD Deborah Mielke, MD Macey Perez, APRN [facility]      Clinic hours: Monday - Thursday 7 am-6 pm; Fridays 7 am-5 pm.   Urgent care: Monday - Friday 10 am- 8 pm; Saturday and Sunday 9 am-5 pm.    Clinic: [phone]       [facility]: Monday - Thursday 8 am - 7 pm; Friday 8 am - 6 pm  [facility]: [phone]     Use [email or web address] for 24/7 diagnosis and treatment of dozens of conditions.

## 2021-05-12 ENCOUNTER — PATIENT OUTREACH (OUTPATIENT)
Dept: GERIATRIC MEDICINE | Facility: CLINIC | Age: 71
End: 2021-05-12

## 2021-05-12 NOTE — PROGRESS NOTES
[facility] Care Coordination Contact    Arranged transportation thru [facility] for the below appt:  Appt Date & Time: 5/13/21 @ 12:00 PM   Clinic Name & Address:    Visit Type: COVID-19 VACCINE INITIAL   Length: 20 min   Department: [facility]        Transportation Provider: [employer] [phone]   time:      Notified Yossi of  time via voicemail.    Leslie Mendez  Care Management Specialist   [facility]   [phone]

## 2021-05-13 ENCOUNTER — IMMUNIZATION (OUTPATIENT)
Dept: LAB | Facility: CLINIC | Age: 71
End: 2021-05-13
Payer: MEDICARE

## 2021-05-13 ENCOUNTER — PATIENT OUTREACH (OUTPATIENT)
Dept: GERIATRIC MEDICINE | Facility: CLINIC | Age: 71
End: 2021-05-13

## 2021-05-13 NOTE — PROGRESS NOTES
[facility] Care Coordination Contact      [facility] Six-Month Telephone Assessment    6 month telephone assessment completed on 5/13/21 with [facility].    ER visits: Yes -  [facility] related to his surgical incision draining.   Hospitalizations: Yes -  [facility] from 4/17-4/21 for Acute Hypoxemic respiratory failure.  TCU stays: No  Significant health status changes: Denies any changes  Falls/Injuries: Yes: 5/11/21  ADL/IADL changes: No  Changes in services: No    Caregiver Assessment follow up:      Goals: See POC in chart for goal progress documentation.      Will see member in 6 months for an annual health risk assessment.   Encouraged member to call CC with any questions or concerns in the meantime.     CC provided reminder on today's COVID vaccine appt and transportation. CG voiced she has received the voicemail.   CC called and left a voice mail message with [facility] to check on the status.      Stu Cody RN Care Coordinator  [facility]  Office: [phone]  Fax: [phone]  [email or web address]

## 2021-05-20 NOTE — PROGRESS NOTES
[facility] Care Coordination Contact    CC called to [facility] and spoke with Gaudencio who stated that Faina was in the office yesterday and she is currently waiting on a PCA # for billing. She will be able to start to complete her time sheets. He stated he did received SA for both PCA and HMK. He will be following up with the PCA regarding the status.    CC called YANCY Arce and provided the above information.      Stu Cody RN Care Coordinator  [facility]  Office: [phone]  Fax: [phone]  [email or web address]

## 2021-05-26 ENCOUNTER — TELEPHONE (OUTPATIENT)
Dept: FAMILY MEDICINE | Facility: CLINIC | Age: 71
End: 2021-05-26

## 2021-05-26 NOTE — TELEPHONE ENCOUNTER
[facility] utilizes an encounter to take the place of a direct phone call to your office. Please take a moment to review the below request. Please reply or route message to author of this encounter.  Message will act as a verbal OK of orders requested below. Thank you.    ORDER  Pt seen for PT reassessment visit today. Pt has making good progress with focus on functional strengthening/gait/trnasfer safety. Requesting to ext home PT 1w2 beginning week of 5/30/21  Jorge Luis Cooper, PT  [phone]

## 2021-05-26 NOTE — PROGRESS NOTES
[facility] Care Coordination Contact    2nd Attempt: Signed Letter not received from member., resent per process.     Leslie Mendez  Care Management Specialist   [facility]   [phone]

## 2021-06-04 DIAGNOSIS — Z53.9 DIAGNOSIS NOT YET DEFINED: Primary | ICD-10-CM

## 2021-06-04 PROCEDURE — G0180 MD CERTIFICATION HHA PATIENT: HCPCS | Performed by: INTERNAL MEDICINE

## 2021-06-18 ENCOUNTER — PATIENT OUTREACH (OUTPATIENT)
Dept: GERIATRIC MEDICINE | Facility: CLINIC | Age: 71
End: 2021-06-18

## 2021-06-18 NOTE — PROGRESS NOTES
[facility] Care Coordination Contact    CC received a phone call from Maxwell, caregiver reporting that member is staying with her at her home at [address] [place], [place] [id number]. She is requesting for address change with the [facility] agency and [facility].     CC notified CMS and the [facility] of address change. [id number] sent. Email sent to Carmen Culver at [facility] for address update.        Stu Cody RN Care Coordinator  [facility] Care Coordination  Office: [phone]  Fax: [phone]  [email or web address]

## 2021-07-06 ENCOUNTER — PATIENT OUTREACH (OUTPATIENT)
Dept: GERIATRIC MEDICINE | Facility: CLINIC | Age: 71
End: 2021-07-06

## 2021-07-06 NOTE — PROGRESS NOTES
[facility] Care Coordination Contact  CC received notification of Emergency Room visit.  ER visit occurred on 7/3 [place] 7/4 [place] [facility] with Dx of Cellulitis to left hand.    CC contacted caregiver, Chep. and reviewed discharge summary.  Member has a follow-up appointment with PCP: Yes: scheduled on 7/20 at 4:40pm with Dr. Mckinley  Member has had a change in condition: No  New referrals placed: No  Home Visit Needed: No  Care plan reviewed and updated.  PCP notified of ED visit via EMR.      Stu Cody RN Care Coordinator  [facility] Care Coordination  Office: [phone]  Fax: [phone]  [email or web address]

## 2021-07-20 ENCOUNTER — ANCILLARY PROCEDURE (OUTPATIENT)
Dept: GENERAL RADIOLOGY | Facility: CLINIC | Age: 71
End: 2021-07-20
Attending: INTERNAL MEDICINE
Payer: MEDICARE

## 2021-07-20 ENCOUNTER — OFFICE VISIT (OUTPATIENT)
Dept: FAMILY MEDICINE | Facility: CLINIC | Age: 71
End: 2021-07-20
Payer: MEDICARE

## 2021-07-20 VITALS
WEIGHT: 142.8 LBS | TEMPERATURE: 97.6 F | HEART RATE: 70 BPM | SYSTOLIC BLOOD PRESSURE: 112 MMHG | OXYGEN SATURATION: 98 % | BODY MASS INDEX: 23.76 KG/M2 | DIASTOLIC BLOOD PRESSURE: 76 MMHG

## 2021-07-20 DIAGNOSIS — T36.95XA ANTIBIOTIC-ASSOCIATED DIARRHEA: ICD-10-CM

## 2021-07-20 DIAGNOSIS — J98.11 ATELECTASIS OF LEFT LUNG: ICD-10-CM

## 2021-07-20 DIAGNOSIS — Z93.1 GASTROSTOMY TUBE DEPENDENT (H): ICD-10-CM

## 2021-07-20 DIAGNOSIS — M67.332 TRANSIENT SYNOVITIS, LEFT WRIST: Primary | ICD-10-CM

## 2021-07-20 DIAGNOSIS — K52.1 ANTIBIOTIC-ASSOCIATED DIARRHEA: ICD-10-CM

## 2021-07-20 PROCEDURE — 84550 ASSAY OF BLOOD/URIC ACID: CPT | Performed by: INTERNAL MEDICINE

## 2021-07-20 PROCEDURE — 99214 OFFICE O/P EST MOD 30 MIN: CPT | Mod: 25 | Performed by: INTERNAL MEDICINE

## 2021-07-20 PROCEDURE — 74019 RADEX ABDOMEN 2 VIEWS: CPT | Performed by: RADIOLOGY

## 2021-07-20 PROCEDURE — 73110 X-RAY EXAM OF WRIST: CPT | Mod: LT | Performed by: RADIOLOGY

## 2021-07-20 PROCEDURE — 82306 VITAMIN D 25 HYDROXY: CPT | Performed by: INTERNAL MEDICINE

## 2021-07-20 PROCEDURE — 36415 COLL VENOUS BLD VENIPUNCTURE: CPT | Performed by: INTERNAL MEDICINE

## 2021-07-20 PROCEDURE — 80053 COMPREHEN METABOLIC PANEL: CPT | Performed by: INTERNAL MEDICINE

## 2021-07-20 PROCEDURE — 82248 BILIRUBIN DIRECT: CPT | Performed by: INTERNAL MEDICINE

## 2021-07-20 PROCEDURE — 71045 X-RAY EXAM CHEST 1 VIEW: CPT | Performed by: RADIOLOGY

## 2021-07-20 PROCEDURE — 86038 ANTINUCLEAR ANTIBODIES: CPT | Performed by: INTERNAL MEDICINE

## 2021-07-20 ASSESSMENT — PAIN SCALES - GENERAL: PAINLEVEL: NO PAIN (0)

## 2021-07-20 NOTE — PROGRESS NOTES
[facility] Internal Medicine Progress Note           Assessment and Plan:   1. Transient synovitis, left wrist  - XR Wrist Left G/E 3 Views  - Uric acid  - Anti Nuclear Mary IgG by IFA with Reflex    2. Antibiotic-associated diarrhea  - Clostridium difficile Toxin B PCR; Future  - Clostridium difficile Toxin B PCR    3. Atelectasis of left lung  - XR Chest 1 View    4. Gastrostomy tube dependent (H)  - XR KUB  - Vitamin D Deficiency  - Basic metabolic panel  (Ca, Cl, CO2, Creat, Gluc, K, Na, BUN)  - Hepatic panel (Albumin, ALT, AST, Bili, Alk Phos, TP)    5. Major neurocognitive disorder due to traumatic brain injury, sequela (H)  Dependent on group home staff for his ADLs.           Interval History:     Dick is a 71 year old who presents today with one of his group home care givers:    Left wrist cellulitis has resolved with Cefadroxil.  But left wrist remains warm and swollen.  No fever, chills but Dick states that he had diarrhea.    Weight loss, simultaneously with reduction of tube feedings to four times a day or 4 cans per day (Isosource 1.5 magalis).    Hyponatremia last 7/3/2021 but improved to 138 mg/dl.    History of recurrent aspiration pneumonitis due to oropharyngeal dysphagia (hx of traumatic brain injury).            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  CV: NEGATIVE for chest pain, palpitations or peripheral edema  GI: POSITIVE for diarrhea and NEGATIVE for hematemesis, hematochezia, jaundice, melena, nausea and poor appetite  : NEGATIVE for frequency, dysuria, or hematuria  MUSCULOSKELETAL:NEGATIVE for significant arthralgias or myalgia  NEURO: POSITIVE for HX seizure D/O and cognitive disorder from traumatic brain injury. and NEGATIVE for HX CVA and HX TIA  ENDOCRINE: NEGATIVE for temperature intolerance, skin/hair changes  HEME: NEGATIVE for bleeding problems  PSYCHIATRIC: NEGATIVE for changes in mood or affect             Physical Exam:   /76 (BP Location: Left arm, Patient Position: Sitting, Cuff Size: Adult Regular)   Pulse 70   Temp 97.6  F (36.4  C) (Tympanic)   Wt 64.8 kg (142 lb 12.8 oz)   SpO2 98%   BMI 23.76 kg/m    Constitutional: fatigued and cooperative  Eyes: extra-ocular muscles intact and sclera clear  ENT: normocepalic, without obvious abnormality  Lungs: Mild respiratory distress, good air exchange, Mild retraction and crackles left base  Cardiovascular: regular rate and rhythm and normal S1 and S2  Abdomen: non-distended, non-tender and PEG tube in the  Periumbilical area.  Neurologic: Motor Exam:  moves all extremities well and symmetrically  Sensory:  Sensory intact  Neuropsychiatric: Affect: flat  Skin: No rashes, erythema, pallor, petechia or purpura.          Data:   Epic reviewed.     Disposition:  Follow-up in 4 weeks.    Eric Mckinley MD  Internal Medicine  [facility] Team

## 2021-07-20 NOTE — PATIENT INSTRUCTIONS
At [facility], we strive to deliver an exceptional experience to you, every time we see you. If you receive a survey, please complete it as we do value your feedback.  If you have MyChart, you can expect to receive results automatically within 24 hours of their completion.  Your provider will send a note interpreting your results as well.   If you do not have MyChart, you should receive your results in about a week by mail.    Your care team:                            Family Medicine Internal Medicine   MD Eric Romero MD Shantel Branch-Fleming, MD Srinivasa Vaka, MD Katya Belousova, PADACIA Joseph, APRN CNP    Robbin Bustamante, MD Pediatrics   Haseeb Esquivel, PADACIA Coreas, CNP MD Briana Mracano APRN CNP   MD Hyacinth Ambrose MD Deborah Mielke, MD Macey Perez, APRN [facility]      Clinic hours: Monday - Thursday 7 am-6 pm; Fridays 7 am-5 pm.   Urgent care: Monday - Friday 10 am- 8 pm; Saturday and Sunday 9 am-5 pm.    Clinic: [phone]       [facility]: Monday - Thursday 8 am - 7 pm; Friday 8 am - 6 pm  [facility]: [phone]     Use [email or web address] for 24/7 diagnosis and treatment of dozens of conditions.

## 2021-07-21 LAB
ALBUMIN SERPL-MCNC: 3.3 G/DL (ref 3.4–5)
ALP SERPL-CCNC: 84 U/L (ref 40–150)
ALT SERPL W P-5'-P-CCNC: 43 U/L (ref 0–70)
ANION GAP SERPL CALCULATED.3IONS-SCNC: 5 MMOL/L (ref 3–14)
AST SERPL W P-5'-P-CCNC: 28 U/L (ref 0–45)
BILIRUB DIRECT SERPL-MCNC: <0.1 MG/DL (ref 0–0.2)
BILIRUB SERPL-MCNC: 0.2 MG/DL (ref 0.2–1.3)
BUN SERPL-MCNC: 15 MG/DL (ref 7–30)
CALCIUM SERPL-MCNC: 9 MG/DL (ref 8.5–10.1)
CHLORIDE BLD-SCNC: 97 MMOL/L (ref 94–109)
CO2 SERPL-SCNC: 29 MMOL/L (ref 20–32)
CREAT SERPL-MCNC: 0.4 MG/DL (ref 0.66–1.25)
DEPRECATED CALCIDIOL+CALCIFEROL SERPL-MC: 45 UG/L (ref 20–75)
GFR SERPL CREATININE-BSD FRML MDRD: >90 ML/MIN/1.73M2
GLUCOSE BLD-MCNC: 77 MG/DL (ref 70–99)
POTASSIUM BLD-SCNC: 4.8 MMOL/L (ref 3.4–5.3)
PROT SERPL-MCNC: 7.8 G/DL (ref 6.8–8.8)
SODIUM SERPL-SCNC: 131 MMOL/L (ref 133–144)
URATE SERPL-MCNC: 4.2 MG/DL (ref 3.5–7.2)

## 2021-07-22 LAB — ANA SER QL IF: NEGATIVE

## 2021-08-06 ENCOUNTER — PATIENT OUTREACH (OUTPATIENT)
Dept: GERIATRIC MEDICINE | Facility: CLINIC | Age: 71
End: 2021-08-06

## 2021-08-06 NOTE — PROGRESS NOTES
[facility] Care Coordination Contact  CC received notification of Emergency Room visit.  ER visit occurred on 8/5/21 at [facility] with Dx of Closed head injury, initial encounter.    CC contacted member and reviewed discharge summary.  Member has a follow-up appointment with PCP: No: Offered Assistance with setting up a follow up appointment.   Member has had a change in condition: Yes: Reported that he needs assistance in toileting and transfer now.   New referrals placed: No  Home Visit Needed: No, will plan to schedule after member see PCP and schedule outpatient PT in the next couple of weeks.Plan for end of the month.   Care plan reviewed and updated.  PCP notified of ED visit via EMR.    CATALINA Arreola reported that member is getting weaker and needing more assistance in transferring and toileting. Discussed regarding outpatient PT which Maxwell is agreeable and feels it will benefit the member.     Appt scheduled with PCP on 8/19 at 5:20pm.       Stu Cody RN Care Coordinator  [facility] Care Coordination  Office: [phone]  Fax: [phone]  [email or web address]

## 2021-08-12 ENCOUNTER — PATIENT OUTREACH (OUTPATIENT)
Dept: GERIATRIC MEDICINE | Facility: CLINIC | Age: 71
End: 2021-08-12

## 2021-08-12 NOTE — PROGRESS NOTES
[facility] Care Coordination Contact    CC called Maxwell, caregiver for member. She shared that the member is require more care than what she can provide. CC recommended for a home visit for reassessment to assess for change in condition. Maxwell stated that there are other group homes in  that may accept member. CC reviewed that they will have to be a DHS provider, has opening and the availability of the staffs to provide care for the member. Maxwell has left a voice mail message with family to discuss. The home visit is scheduled for Thursday Aug 26 at 1pm.      Stu Cody RN Care Coordinator  [facility] Care Coordination  Office: [phone]  Fax: [phone]  [email or web address]

## 2021-08-17 ENCOUNTER — PATIENT OUTREACH (OUTPATIENT)
Dept: GERIATRIC MEDICINE | Facility: CLINIC | Age: 71
End: 2021-08-17

## 2021-08-17 NOTE — PROGRESS NOTES
[facility] Care Coordination Contact  CC received notification of Emergency Room visits x2.  ER visits occurred on 8/13/21 at [facility]  with Dx of fall, head laceration requiring staples.  Member also in ED 8/15/21 at [facility] with dx of pneumonia.    CC contacted member and caregiver, CATALINA Sy, and reviewed discharge summary.  Member on abx now and Yossi says he has perked up a bit and doing a little better.  Member has a follow-up appointment with PCP: Yes: scheduled on 8/19/210 at 5:20pm with Dr. Mckinley.  Will need to go back again to get staples out 7-10 days after 8/13.  Member has had a change in condition: Yes: has been declining in overall functioning and caregivers feel he likely needs change in living environment at some point.   New referrals placed: No  Home Visit Needed: Yes: for change in condition and is scheduled for 8/26/21 at 1:00pm with members primary CC, Stu Cody, RN  Care plan reviewed and updated.  PCP notified of ED visit via EMR.    JESUS Sosa  [facility] Care Coordinator  [phone]

## 2021-08-19 NOTE — TELEPHONE ENCOUNTER
RN notified patient's mother of the provider's message as it's written below.  Patient's mother wasn't aware of the message below because she knows patient is up to date with his labs and not sure why patient called.  Mother agrees and verbalized understanding.     Bakari RAMSEY RN, BSN         Detail Level: Detailed

## 2021-08-26 ENCOUNTER — PATIENT OUTREACH (OUTPATIENT)
Dept: GERIATRIC MEDICINE | Facility: CLINIC | Age: 71
End: 2021-08-26

## 2021-08-26 ASSESSMENT — ACTIVITIES OF DAILY LIVING (ADL)
DEPENDENT_IADLS:: CLEANING;LAUNDRY;COOKING;SHOPPING;MEAL PREPARATION;MEDICATION MANAGEMENT;MONEY MANAGEMENT;TRANSPORTATION;INCONTINENCE

## 2021-08-27 ENCOUNTER — PATIENT OUTREACH (OUTPATIENT)
Dept: GERIATRIC MEDICINE | Facility: CLINIC | Age: 71
End: 2021-08-27

## 2021-08-27 NOTE — PROGRESS NOTES
[facility] Care Coordination Contact    CC received a VM from [facility] that she is having issue getting tube feeding from [employer] as he is completely out. She stated that member is currently taking 6 cans per day not 4 cans. She is requesting assistance.    CC called and spoken with haris Gates for member regarding member's status and placement. She stated she has contacted [facility] at [phone] regarding opening in their LTC. She stated there are a couple of openings. CC called to [facility] and they will need his records/medications and the ADL assistance for review. It will be reviewed on Monday.    CC called to [email or web address] and spoken with Ann. She stated 3 cases of Isosource 1.5 has been released for  today and she will have a supervisor review his record on send a form to PCP. It was decided 185 cans/month.     CC called to Maxwell and she will be picking up the TF. This CC will work on getting new order for tube feeding but not incontinence supplies as he has enough for a few weeks. Plan is for member to be admitted to LTC for placement.    CC rightfaxed member's medical record, medication list and ADL assistance to [facility] at [phone].       Stu CodyRN Care Coordinator  [facility] Care Coordination  Office: [phone]  Fax: [phone]  [email or web address]

## 2021-08-30 ENCOUNTER — PATIENT OUTREACH (OUTPATIENT)
Dept: GERIATRIC MEDICINE | Facility: CLINIC | Age: 71
End: 2021-08-30
Payer: MEDICARE

## 2021-08-30 NOTE — PROGRESS NOTES
TRANSITIONS OF CARE (SIDNEY) LOG   SIDNEY tasks should be completed by the CC within one (1) business day of notification of each transition. Follow up contact with member is required after return to their usual care setting.  Note:  If CC finds out about the transitions fifteen (15) days or more after the member has returned to their usual care setting, no SIDNEY log is needed. However, the CC should check in with the member to discuss the transition process, any changes needed to the care plan and document it in a case note.    Member Name:  Gaudencio Avila MCO Name:  [employer] Member ID#: [id number]   Product: [employer] Care Coordinator Contact:  Stu Cody RN Agency/County/Care System: [place]   Transition Communication Actions from Care Management Contact   Transition #1   Notification Date: 8/30/21 Transition Date:   8/28/21 Transition From: Home     Is this the member s usual care setting?               yes Transition To: Hospital, [facility]    Transition Type:  Unplanned  Reason for Admission/Comments:  8/30/21- Arrived to ED via EMS for staples removal from injury on 8/28/21. He was found to have right sided abdominal and flank pain. He was found to have multiple rib fractures to right side. CC received a VM from CAMRYN Dickerson ([phone]) at [facility] inquiring about discharge plan after hospitalization. She stated he is moving well but very slow. A [facility] report was filed regarding to his multiple falls but questions his ability to make safe decision. She has contacted [facility] and they are reviewing his record at this time.  CC called and left a VM with CC's objective assessment from Thursday. Left contact for CAMRYN'er to call back. Left voice mail messages with providers to have services on hold.   8/31/21 CC called and left a VM with CAMRYN Dickerson'neha and explained that Dick lives in a private home and rents a room. He is receiving PCA,HMK and lifeline.   9/1/21-CC received a VM from Joanie  ([phone]) at [place] [facility] re: a couple of complaints about the member. She is calling re: him living Chep's home, verify services for member. CC called and left a VM at the number listed.   9/2/21-CC received a phone call from Joanie at the Dept of [facility] facility re: complaints about the member. She shared the case notes information. She will call if she has more questions. CC spoke with Bossman Garcia from [facility] that she has rececived a decline from [facility] that they are unable to meet his needs. He is able to walk 300ft with walker and supervision. PT recommends STR and 24 hours/supervision care. CC explained that [facility] is not able to care for him and he is not safe to return home. At this time, they are working on discharging him to STR and then to alternate living option.   9/3/21 CC received a VM from Geovanna in Admission that member should not be admitted back into their facility as he was non compliance with his NPO status and drinking out of the water fountain. He would also wandering out of the facility and be found at the bank near by. He would go into his mom's room frequently and interrupt with her routine. He is not appropriate for the [facility] memory care. They are not able to take him back.   9/7/21 CC received a phone call from Jayla at [facility] informed this CC that she has found an opening for member at [facility] for LTC placement. Member is agreeable to going and will be transport there at 3pm today. She will provide the address/contact to member and also call haris Antonio. [facility] with [facility] family about picking up his things at her home. CC called and spoke with [facility] at [facility] to have service on hold until end of the month. CC will communicate if [facility] service needs to be cancelled d/t waiver closing.     Stu Cody RN Care Coordinator  [facility] Care Coordination9  Office: [phone]  Fax:  [phone]  [email or web address]   Shared CC contact info, care plan/services with receiving setting--Date completed: 8/30/21   Notified PCP of transition--Date completed:  8/30/21    via  EMR   Transition #2      Notification Date: 9/7/21       Transition To:  LTC, [facility]  Transition Date: 9/7/21     Transition Type:    Planned  Notified PCP -- Date completed: 9/7/21              Shared CC contact info, care plan/services with receiving setting or, if applicable, home care agency--Date completed: 9/8/21  *Complete additional tasks below, if this transition is a return to usual care setting.      Comments:    9/8/21 CC called and [facility] a [facility] has been admitted to their facility. Left contact number for them to call with questions. [facility] ([phone])  9/9/21 CC spoke with Geovanna, who stated that member is currently in the ARU for therapy. She stated to check back on Tue 9/14 on his status after their meeting. The plan is to move member to LTC setting. She inquired if member would be agreeable. CC explained that he was agreeable to going to LTC while he was hospitalized.  CC reviewed that his niece Marisela discussed re: financial POA and HCA for member. The plan is to close his EW on 9/27 and transfer to their onsite team [facility] to manage.   9/14/21: left a message with CAMRYN at [phone] and requested call back with update on status and if LTC transition occurred.   Jessie Hairston RN  [facility]  P: [phone]  Fax: [phone]  9/16/21: no return call from - CC left a message again and requested update.  Jessie Hairston RN  [facility]  P: [phone]  Fax: [phone]  9/21/21: CC received update from [facility] on 9/17, member is still in TCU. CC called today to check on status and spoke with dinh Hightower who stated they will have a meeting today and will let CC know the status.  CC provided member's history and plan is to move from TCU to LTC d/t cognitive decline.   9/28/21 CC spoke with CAMRYN Hightower at [facility]. She stated that they will be holding a care conference with the family this week before moving him to LTC. She will let CC know once it is scheduled. CC explained that his EW will be close as of 9/27/21. She is aware. She stated member is agreeable in going to LTC.   10/5/21 CC called and left a voice mail message with CAMRYN's office at [facility] re: TCU to LTC transition. CC received a return call from Alyson from [facility] that she has not been able to arrange care conference with family as the brother has not called back. CC recommend to schedule with his niece, Marisela who is more supportive to assist with decision making.   10/12 CC received fax notification from [facility] re: tube feeding order. CC emailed to [employer] and notified them that member has been in TCU since 9/7/21 with plan to transition to LTC.   CC called and left a voice mail message with CAMRYN'er office at [facility] re: TCU to LTC transition. Received a voice mail message from Mirian that they are still waiting to hear back from the niece to schedule care conference. They will notify this CC when they have it scheduled.   10/13 CC called and spoke with niece Marisela. She stated the facility called and left a voice mail message yesterday and she has already called back late in the afternoon. CC reviewed TCU to LTC transition. She stated she will call again today. Marisela stated she is aware of the recent ER visit of Gtube replacement. CC received a phone call from Florencia. Care conference with family is scheduled for 10/14 at 2:30pm. She inquired if anyone has talked with member about the transition. CC explained that member was agreeable at the time of the transition in September and she is not sure if anyone has talked with him since. She will call this CC tomorrow.   10/14 CC waited for the phone call for  care conference but did not received. CC called and spoke with Mirian. Member was transitioned to LTC on 10/7/21. Member is full code, recommend for 24 hours supervised AL care with frequent safety checks, moderate assistance in bathing, able to walk 200-400 ft with walker. CC shared that she will discuss the case with ilaece about recommendation. CC spoke with Marisela ([phone]) regarding moving back into the community with either AL or group home setting for member. CC reviewed the challenges of finding placement with member having G-tube, history of falls and behavioral issues. Marisela stated that member has been in group home before and he was not successful. He is stubborn. He was in a NH in the past where he wandered off of the property many time where the facility put a tracker on his walker.  At this time, [facility] will keep him there until they are requesting for NH discharge, then we can start looking for placement. She agreed with the plan. CC called and left a voice mail message with CAMRYN's line with the plan.   10/15/21 CC spoke with Mirian, they will pursue what the family wants for the member which is to stay at LTC and transfer to [facility]. She will keep this CC updated if the status was to change.   12/1/21 Transferred to onsite team [place].     Stu Cody RN Care Coordinator  [facility] Care Coordination  Office: [phone]  Fax: [phone]  [email or web address]                    *Complete tasks below when the member is discharging TO their usual care setting within one (1) business day of notification.  For situations where the Care Coordinator is notified of the discharge prior to the date of discharge, the Care Coordinator must follow up with the member or designated representative to confirm that discharge actually occurred and discuss required SIDNEY tasks as outlined in the SIDNEY Instructions.  (This includes situations where it may be a  new  usual care setting for the member.  (i.e., a community member who decides upon permanent nursing home placement following hospitalization and rehab).    Date completed:n/a  Communicated with member or their designated representative about the following:  care transition process; about changes to the member s health status; plan of care updates; education about transitions and how to prevent unplanned transitions/readmissions  Four Pillars for Optimal Transition:    Check  Yes  - if the member, family member and/or SNF/facility staff manages the following:    If  No  provide explanation in the comments section.          []  Yes     []  No     Does the member have a follow-up appointment scheduled with primary care or specialist? (Mental health hospitalizations--the appt. should be w/in 7 days)   []  Yes     []  No     Can the member manage their medications or is there a system in place to manage medications (e.g. home care set-up)?         []  Yes     []  No     Can the member verbalize warning signs and symptoms to watch for and how to respond?         []  Yes     []  No     Does the member use a Personal Health Care Record?  Check  Yes  if visit summary, discharge summary, and/or healthcare summary are being used as a PHR.                                                                                                                                                                                    [] Yes      [] No      Have you updated the member s care plan?  If  No  provide explanation in comments.   Comments:  Transferred to Onsite team [place] effective date 12/1/21. Left VM with SW at [facility] at [facility].      Stu Cody RN Care Coordinator  [facility] Care Coordination  Office: [phone]  Fax: [phone]  [email or web address]

## 2021-08-31 NOTE — PROGRESS NOTES
[facility] Care Coordination Contact    [facility] Change in Condition Assessment    Home visit for Change in Condition Health Risk Assessment with Gaudencio Avila completed on August 26, 2021.    Reason for Early reassessment: Health Status Change  Yes, if yes explain Change in functional status, frequent falls, and requesting LTC placement.    Type of residence:: Private home - stairs  Current living arrangement:: I live in a private home   Assessment completed with:: Patient, Caregiver Chep and this CC.     Current Care Plan  Member currently receiving the following home care services: n/a     Member currently receiving the following community resources: PCA, Housekeeping/Chore Agency, Other (see comment) ([employer] for TF supplies.)    Medication Review  Medication reconciliation completed in Epic: Yes  Medication set-up & administration: RN set up daily.  Caregiver administer through [employer] twice a day..  Medication Risk Assessment Medication (1 or more, place referral to MTM): Recent falls within past year  MTM Referral Placed: No: Plan for LTC placement    Mental/Behavioral Health   Depression Screening:   PHQ-2 Total Score (Adult) - Positive if 3 or more points; Administer PHQ-9 if positive: 0  Mental health DX:: No        Falls Assessment:   Fallen 2 or more times in the past year?: Yes   Any fall with injury in the past year?: Yes    ADL/IADL Dependencies:   Dependent ADLs:: Ambulation-walker, Bathing, Dressing, Eating, Grooming, Incontinence, Transfers, Toileting  Dependent IADLs:: Cleaning, Laundry, Cooking, Shopping, Meal Preparation, Medication Management, Money Management, Transportation, Incontinence    [facility] sponsored benefits: Shared information re: Silver Sneakers/gym memberships, ASA, Calcium +D.    PCA Assessment completed at visit: Yes Annual PCA assessment indicated 24 units per day of PCA. This is an increase from the previous assessment.      Elderly Waiver  Eligibility: Yes-will continue on EW    Care Plan & Recommendations: CC discussed re: higher level of placement to LUX or LTC as member is requiring 24 hours supervision and care. The current caregiver is not able to provide any longer as member has multiple falls and does not follow through with educations. Member requested to go to [facility] in [place] as his mom is there.    Discussed re: Advance Care Directives and Health Care agent. Member stated he would like his mom to be his HC agent. CC explained that his mom is 90 years old and lives in a nursing home. She is not a good agent to choose. CC recommended that he discuss with his brother Holden or niece Marisela to be his health agent. Member verbalized understanding. Preventive measure of Colonoscopy after he moves to another living arrangement.   See [facility] for detailed assessment information.    CC faxed medical history, medications list and ADL dependency to [facility] at [phone] on 8/27/21 for review.     Follow-Up Plan: Member informed of future contact, plan to f/u with member with a 6 month telephone assessment.  Contact information shared with member and family, encouraged member to call with any questions or concerns at any time.    [place] care continuum providers: Please refer to Health Care Home on the Epic Problem List to view this patient's [facility] Care Plan Summary.      Stu Cody RN Care Coordinator  [facility] Care Coordination  Office: [phone]  Fax: [phone]  [email or web address]

## 2021-09-08 ENCOUNTER — PATIENT OUTREACH (OUTPATIENT)
Dept: GERIATRIC MEDICINE | Facility: CLINIC | Age: 71
End: 2021-09-08

## 2021-09-08 NOTE — LETTER
September 8, 2021    SAUL BENNETT  [address]  [place] [address]        Dear Saul:    At [facility], we are dedicated to improving your health and well-being. Enclosed is the Comprehensive Care Plan that we developed with you on 8/26/2021. Please review the Care Plan carefully.    As a reminder, some of the things we discussed at your visit include:    Your physical and mental health    Ways to reduce falls    Health care needs you may have    Don t forget to contact your care coordinator if you:    Have been hospitalized or plan to be hospitalized     Have had a fall     Have experienced a change in physical health    Are experiencing emotional problems     If you do not agree with your Care Plan, have questions about it, or have experienced a change in your needs, please call me at [phone]. If you are hearing impaired, please call the [place] Relay at 925 or [phone] (ajuqih-sd-kidsni relay service).    Sincerely,        Stu Cody RN  [phone]  [email or web address]      [facility]+[id number] [place] ([id number])     [id number] (11/18)

## 2021-09-08 NOTE — PROGRESS NOTES
[facility] Care Coordination Contact    Received after visit chart from care coordinator.  Completed following tasks: Mailed copy of care plan to client, Updated services in access, Submitted referrals/auths for PERS & Hmkg and  .  , Provider Signature - No POC Shared:  Member indicates that they do not want their POC shared with any EW providers.    [facility]:  Emailed completed PCA assessment to [facility].  Faxed copy of PCA assessment to PCA Agency and mailed copy to member.  Faxed MD Communication to PCP.     Mel Villagran  Case Management Specialist  [facility]  [phone]

## 2021-09-12 ENCOUNTER — HEALTH MAINTENANCE LETTER (OUTPATIENT)
Age: 71
End: 2021-09-12

## 2021-09-29 ENCOUNTER — PATIENT OUTREACH (OUTPATIENT)
Dept: GERIATRIC MEDICINE | Facility: CLINIC | Age: 71
End: 2021-09-29
Payer: MEDICARE

## 2021-09-29 NOTE — PROGRESS NOTES
[facility] Care Coordination Contact    Right Faxed [facility] [place] the 1998 form with notification that member's Elderly Waiver was closed effective 9/1/21 as member has been out of the community since 8/28/21 with plan to transition to LTC.       Stu CodyRN Care Coordinator  [facility] Care Coordination  Office: [phone]  Fax: [phone]  [email or web address]

## 2021-10-19 ENCOUNTER — LAB REQUISITION (OUTPATIENT)
Dept: LAB | Facility: CLINIC | Age: 71
End: 2021-10-19
Payer: MEDICARE

## 2021-10-19 DIAGNOSIS — R53.1 WEAKNESS: ICD-10-CM

## 2021-10-20 ENCOUNTER — PATIENT OUTREACH (OUTPATIENT)
Dept: GERIATRIC MEDICINE | Facility: CLINIC | Age: 71
End: 2021-10-20

## 2021-10-20 LAB
ANION GAP SERPL CALCULATED.3IONS-SCNC: 10 MMOL/L (ref 5–18)
BUN SERPL-MCNC: 17 MG/DL (ref 8–28)
CALCIUM SERPL-MCNC: 9.3 MG/DL (ref 8.5–10.5)
CHLORIDE BLD-SCNC: 100 MMOL/L (ref 98–107)
CO2 SERPL-SCNC: 24 MMOL/L (ref 22–31)
CREAT SERPL-MCNC: 0.54 MG/DL (ref 0.7–1.3)
ERYTHROCYTE [DISTWIDTH] IN BLOOD BY AUTOMATED COUNT: 15.1 % (ref 10–15)
GFR SERPL CREATININE-BSD FRML MDRD: >90 ML/MIN/1.73M2
GLUCOSE BLD-MCNC: 88 MG/DL (ref 70–125)
HCT VFR BLD AUTO: 37.8 % (ref 40–53)
HGB BLD-MCNC: 12.4 G/DL (ref 13.3–17.7)
MCH RBC QN AUTO: 33.2 PG (ref 26.5–33)
MCHC RBC AUTO-ENTMCNC: 32.8 G/DL (ref 31.5–36.5)
MCV RBC AUTO: 101 FL (ref 78–100)
PLATELET # BLD AUTO: 217 10E3/UL (ref 150–450)
POTASSIUM BLD-SCNC: 4.1 MMOL/L (ref 3.5–5)
RBC # BLD AUTO: 3.74 10E6/UL (ref 4.4–5.9)
SODIUM SERPL-SCNC: 134 MMOL/L (ref 136–145)
WBC # BLD AUTO: 10 10E3/UL (ref 4–11)

## 2021-10-20 PROCEDURE — 85027 COMPLETE CBC AUTOMATED: CPT | Mod: ORL | Performed by: NURSE PRACTITIONER

## 2021-10-20 PROCEDURE — 36415 COLL VENOUS BLD VENIPUNCTURE: CPT | Mod: ORL | Performed by: NURSE PRACTITIONER

## 2021-10-20 PROCEDURE — 80048 BASIC METABOLIC PNL TOTAL CA: CPT | Mod: ORL | Performed by: NURSE PRACTITIONER

## 2021-10-20 PROCEDURE — P9604 ONE-WAY ALLOW PRORATED TRIP: HCPCS | Mod: ORL | Performed by: NURSE PRACTITIONER

## 2021-10-20 NOTE — PROGRESS NOTES
[facility] Care Coordination Contact    CC received a voice mail message from niryne Antonio sharing that member was having neck pain and is not walking as much. They are planning on doing xray.     CC called to Marisela. Discussed regarding health care directives/Health care agent. CC will email the [facility] short form for her to have it complete and notarized at [facility]. CC discussed regarding staying in LTC. She stated he will have much close monitoring of his health in LTC rather than in the community. He has tried it in the past but has not been successful. Marisela agreed that member needs to continue to stay at LTC.       Stu Cody RN,BSN, [facility] Care Coordination  Office: [phone]  Fax: [phone]  [email or web address]

## 2021-11-09 ENCOUNTER — MEDICAL CORRESPONDENCE (OUTPATIENT)
Dept: HEALTH INFORMATION MANAGEMENT | Facility: CLINIC | Age: 71
End: 2021-11-09
Payer: MEDICARE

## 2021-11-11 NOTE — PROGRESS NOTES
[facility] Care Coordination Contact    CC submitted DTR request for PCA/HMK and PERS for member as he has been admitted to LTC on 10/7/21.       Stu Cody RN,BSN, [facility] Care Coordination  Office: [phone]  Fax: [phone]  [email or web address]

## 2021-11-18 ENCOUNTER — PATIENT OUTREACH (OUTPATIENT)
Dept: GERIATRIC MEDICINE | Facility: CLINIC | Age: 71
End: 2021-11-18
Payer: MEDICARE

## 2021-11-18 NOTE — PROGRESS NOTES
[facility] Care Coordination Contact    CC received email from Mar at [facility] [email or web address] regarding monthly service for lifeline. She confirmed that she has received the policy termination from the health plan.       Stu Cody RN,BSN, [facility] Care Coordination  Office: [phone]  Fax: [phone]  [email or web address]

## 2021-11-30 ENCOUNTER — PATIENT OUTREACH (OUTPATIENT)
Dept: GERIATRIC MEDICINE | Facility: CLINIC | Age: 71
End: 2021-11-30
Payer: MEDICARE

## 2021-11-30 NOTE — PROGRESS NOTES
[facility] Care Coordination Contact    No longer active with [facility] case management effective 12/1/21. Transfer to Onsite team Sherry.   Reason for community disenrollment: Move to LTC     Stu Cody RN,BSN, [facility] Care Coordination  Office: [phone]  Fax: [phone]  [email or web address]

## 2021-12-17 ENCOUNTER — PATIENT OUTREACH (OUTPATIENT)
Dept: GERIATRIC MEDICINE | Facility: CLINIC | Age: 71
End: 2021-12-17
Payer: MEDICARE

## 2021-12-17 NOTE — PROGRESS NOTES
[facility] Care Coordination Contact    Received a request to submit a DTR for the terminated of Homemaking, PCA, lifeline and EW. Documentation completed and faxed to the health plan. Care Coordinator aware.    Chrystla Scherer RN  Utilization   [facility]  [phone]

## 2022-02-27 ENCOUNTER — HEALTH MAINTENANCE LETTER (OUTPATIENT)
Age: 72
End: 2022-02-27

## 2022-07-26 ENCOUNTER — LAB REQUISITION (OUTPATIENT)
Dept: LAB | Facility: CLINIC | Age: 72
End: 2022-07-26
Payer: MEDICARE

## 2022-07-26 DIAGNOSIS — R68.89 OTHER GENERAL SYMPTOMS AND SIGNS: ICD-10-CM

## 2022-07-26 DIAGNOSIS — R79.89 OTHER SPECIFIED ABNORMAL FINDINGS OF BLOOD CHEMISTRY: ICD-10-CM

## 2022-07-28 LAB
ANION GAP SERPL CALCULATED.3IONS-SCNC: 12 MMOL/L (ref 7–15)
BASOPHILS # BLD AUTO: 0.1 10E3/UL (ref 0–0.2)
BASOPHILS NFR BLD AUTO: 0 %
BUN SERPL-MCNC: 11.3 MG/DL (ref 8–23)
CALCIUM SERPL-MCNC: 9.6 MG/DL (ref 8.8–10.2)
CHLORIDE SERPL-SCNC: 97 MMOL/L (ref 98–107)
CREAT SERPL-MCNC: 0.43 MG/DL (ref 0.67–1.17)
DEPRECATED HCO3 PLAS-SCNC: 27 MMOL/L (ref 22–29)
EOSINOPHIL # BLD AUTO: 0.2 10E3/UL (ref 0–0.7)
EOSINOPHIL NFR BLD AUTO: 1 %
ERYTHROCYTE [DISTWIDTH] IN BLOOD BY AUTOMATED COUNT: 14.6 % (ref 10–15)
GFR SERPL CREATININE-BSD FRML MDRD: >90 ML/MIN/1.73M2
GLUCOSE SERPL-MCNC: 72 MG/DL (ref 70–99)
HCT VFR BLD AUTO: 44.8 % (ref 40–53)
HGB BLD-MCNC: 14.6 G/DL (ref 13.3–17.7)
IMM GRANULOCYTES # BLD: 0.1 10E3/UL
IMM GRANULOCYTES NFR BLD: 0 %
LYMPHOCYTES # BLD AUTO: 3.1 10E3/UL (ref 0.8–5.3)
LYMPHOCYTES NFR BLD AUTO: 24 %
MCH RBC QN AUTO: 33.1 PG (ref 26.5–33)
MCHC RBC AUTO-ENTMCNC: 32.6 G/DL (ref 31.5–36.5)
MCV RBC AUTO: 102 FL (ref 78–100)
MONOCYTES # BLD AUTO: 1.5 10E3/UL (ref 0–1.3)
MONOCYTES NFR BLD AUTO: 11 %
NEUTROPHILS # BLD AUTO: 8.5 10E3/UL (ref 1.6–8.3)
NEUTROPHILS NFR BLD AUTO: 64 %
NRBC # BLD AUTO: 0 10E3/UL
NRBC BLD AUTO-RTO: 0 /100
PLATELET # BLD AUTO: 225 10E3/UL (ref 150–450)
POTASSIUM SERPL-SCNC: 3.6 MMOL/L (ref 3.4–5.3)
RBC # BLD AUTO: 4.41 10E6/UL (ref 4.4–5.9)
SODIUM SERPL-SCNC: 136 MMOL/L (ref 136–145)
VALPROATE SERPL-MCNC: 69.1 UG/ML
WBC # BLD AUTO: 13.3 10E3/UL (ref 4–11)

## 2022-07-28 PROCEDURE — 85025 COMPLETE CBC W/AUTO DIFF WBC: CPT | Mod: ORL | Performed by: INTERNAL MEDICINE

## 2022-07-28 PROCEDURE — P9603 ONE-WAY ALLOW PRORATED MILES: HCPCS | Mod: ORL | Performed by: INTERNAL MEDICINE

## 2022-07-28 PROCEDURE — 80048 BASIC METABOLIC PNL TOTAL CA: CPT | Mod: ORL | Performed by: INTERNAL MEDICINE

## 2022-07-28 PROCEDURE — 80164 ASSAY DIPROPYLACETIC ACD TOT: CPT | Mod: ORL | Performed by: INTERNAL MEDICINE

## 2022-07-28 PROCEDURE — 36415 COLL VENOUS BLD VENIPUNCTURE: CPT | Mod: ORL | Performed by: INTERNAL MEDICINE

## 2022-08-02 ENCOUNTER — LAB REQUISITION (OUTPATIENT)
Dept: LAB | Facility: CLINIC | Age: 72
End: 2022-08-02
Payer: MEDICARE

## 2022-08-04 LAB
ANION GAP SERPL CALCULATED.3IONS-SCNC: 13 MMOL/L (ref 7–15)
BASOPHILS # BLD AUTO: 0.1 10E3/UL (ref 0–0.2)
BASOPHILS NFR BLD AUTO: 1 %
BUN SERPL-MCNC: 11.1 MG/DL (ref 8–23)
CALCIUM SERPL-MCNC: 9.6 MG/DL (ref 8.8–10.2)
CHLORIDE SERPL-SCNC: 98 MMOL/L (ref 98–107)
CREAT SERPL-MCNC: 0.41 MG/DL (ref 0.67–1.17)
DEPRECATED HCO3 PLAS-SCNC: 21 MMOL/L (ref 22–29)
EOSINOPHIL # BLD AUTO: 0.2 10E3/UL (ref 0–0.7)
EOSINOPHIL NFR BLD AUTO: 1 %
ERYTHROCYTE [DISTWIDTH] IN BLOOD BY AUTOMATED COUNT: 14.6 % (ref 10–15)
GFR SERPL CREATININE-BSD FRML MDRD: >90 ML/MIN/1.73M2
GLUCOSE SERPL-MCNC: 77 MG/DL (ref 70–99)
HCT VFR BLD AUTO: 46.2 % (ref 40–53)
HGB BLD-MCNC: 15.2 G/DL (ref 13.3–17.7)
IMM GRANULOCYTES # BLD: 0 10E3/UL
IMM GRANULOCYTES NFR BLD: 0 %
LYMPHOCYTES # BLD AUTO: 4.5 10E3/UL (ref 0.8–5.3)
LYMPHOCYTES NFR BLD AUTO: 40 %
MCH RBC QN AUTO: 33.2 PG (ref 26.5–33)
MCHC RBC AUTO-ENTMCNC: 32.9 G/DL (ref 31.5–36.5)
MCV RBC AUTO: 101 FL (ref 78–100)
MONOCYTES # BLD AUTO: 1 10E3/UL (ref 0–1.3)
MONOCYTES NFR BLD AUTO: 9 %
NEUTROPHILS # BLD AUTO: 5.5 10E3/UL (ref 1.6–8.3)
NEUTROPHILS NFR BLD AUTO: 49 %
NRBC # BLD AUTO: 0 10E3/UL
NRBC BLD AUTO-RTO: 0 /100
PLATELET # BLD AUTO: 197 10E3/UL (ref 150–450)
POTASSIUM SERPL-SCNC: 4.6 MMOL/L (ref 3.4–5.3)
RBC # BLD AUTO: 4.58 10E6/UL (ref 4.4–5.9)
SODIUM SERPL-SCNC: 132 MMOL/L (ref 136–145)
VALPROATE SERPL-MCNC: 22.9 UG/ML
WBC # BLD AUTO: 11.3 10E3/UL (ref 4–11)

## 2022-08-04 PROCEDURE — 85025 COMPLETE CBC W/AUTO DIFF WBC: CPT | Mod: ORL | Performed by: NURSE PRACTITIONER

## 2022-08-04 PROCEDURE — P9604 ONE-WAY ALLOW PRORATED TRIP: HCPCS | Mod: ORL | Performed by: NURSE PRACTITIONER

## 2022-08-04 PROCEDURE — 36415 COLL VENOUS BLD VENIPUNCTURE: CPT | Mod: ORL | Performed by: NURSE PRACTITIONER

## 2022-08-04 PROCEDURE — 80048 BASIC METABOLIC PNL TOTAL CA: CPT | Mod: ORL | Performed by: NURSE PRACTITIONER

## 2022-08-04 PROCEDURE — 80164 ASSAY DIPROPYLACETIC ACD TOT: CPT | Mod: ORL | Performed by: NURSE PRACTITIONER

## 2022-10-04 PROBLEM — S06.9X9S UNSPECIFIED INTRACRANIAL INJURY WITH LOSS OF CONSCIOUSNESS OF UNSPECIFIED DURATION, SEQUELA (H): Status: ACTIVE | Noted: 2018-08-30

## 2022-10-27 ENCOUNTER — LAB REQUISITION (OUTPATIENT)
Dept: LAB | Facility: CLINIC | Age: 72
End: 2022-10-27
Payer: MEDICARE

## 2022-10-27 DIAGNOSIS — D64.9 ANEMIA, UNSPECIFIED: ICD-10-CM

## 2022-10-28 LAB
BASOPHILS # BLD MANUAL: 0.2 10E3/UL (ref 0–0.2)
BASOPHILS NFR BLD MANUAL: 2 %
EOSINOPHIL # BLD MANUAL: 0.1 10E3/UL (ref 0–0.7)
EOSINOPHIL NFR BLD MANUAL: 1 %
ERYTHROCYTE [DISTWIDTH] IN BLOOD BY AUTOMATED COUNT: 14.6 % (ref 10–15)
HCT VFR BLD AUTO: 40.6 % (ref 40–53)
HGB BLD-MCNC: 12.9 G/DL (ref 13.3–17.7)
LYMPHOCYTES # BLD MANUAL: 6 10E3/UL (ref 0.8–5.3)
LYMPHOCYTES NFR BLD MANUAL: 57 %
MCH RBC QN AUTO: 33.2 PG (ref 26.5–33)
MCHC RBC AUTO-ENTMCNC: 31.8 G/DL (ref 31.5–36.5)
MCV RBC AUTO: 104 FL (ref 78–100)
MONOCYTES # BLD MANUAL: 1.4 10E3/UL (ref 0–1.3)
MONOCYTES NFR BLD MANUAL: 13 %
NEUTROPHILS # BLD MANUAL: 2.9 10E3/UL (ref 1.6–8.3)
NEUTROPHILS NFR BLD MANUAL: 27 %
PLAT MORPH BLD: ABNORMAL
PLATELET # BLD AUTO: 234 10E3/UL (ref 150–450)
RBC # BLD AUTO: 3.89 10E6/UL (ref 4.4–5.9)
RBC MORPH BLD: ABNORMAL
WBC # BLD AUTO: 10.6 10E3/UL (ref 4–11)

## 2022-10-28 PROCEDURE — 85007 BL SMEAR W/DIFF WBC COUNT: CPT | Mod: ORL | Performed by: NURSE PRACTITIONER

## 2022-10-28 PROCEDURE — P9603 ONE-WAY ALLOW PRORATED MILES: HCPCS | Mod: ORL | Performed by: NURSE PRACTITIONER

## 2022-10-28 PROCEDURE — 85027 COMPLETE CBC AUTOMATED: CPT | Mod: ORL | Performed by: NURSE PRACTITIONER

## 2022-10-28 PROCEDURE — 36415 COLL VENOUS BLD VENIPUNCTURE: CPT | Mod: ORL | Performed by: NURSE PRACTITIONER

## 2022-11-19 ENCOUNTER — HEALTH MAINTENANCE LETTER (OUTPATIENT)
Age: 72
End: 2022-11-19

## 2023-03-30 ENCOUNTER — LAB REQUISITION (OUTPATIENT)
Dept: LAB | Facility: CLINIC | Age: 73
End: 2023-03-30
Payer: MEDICARE

## 2023-03-30 DIAGNOSIS — I10 ESSENTIAL (PRIMARY) HYPERTENSION: ICD-10-CM

## 2023-03-30 DIAGNOSIS — G40.911 EPILEPSY, UNSPECIFIED, INTRACTABLE, WITH STATUS EPILEPTICUS (H): ICD-10-CM

## 2023-03-31 LAB
ANION GAP SERPL CALCULATED.3IONS-SCNC: 9 MMOL/L (ref 7–15)
BASOPHILS # BLD MANUAL: 0.1 10E3/UL (ref 0–0.2)
BASOPHILS NFR BLD MANUAL: 1 %
BUN SERPL-MCNC: 14.1 MG/DL (ref 8–23)
CALCIUM SERPL-MCNC: 8.7 MG/DL (ref 8.8–10.2)
CHLORIDE SERPL-SCNC: 104 MMOL/L (ref 98–107)
CREAT SERPL-MCNC: 0.47 MG/DL (ref 0.67–1.17)
DEPRECATED HCO3 PLAS-SCNC: 26 MMOL/L (ref 22–29)
EOSINOPHIL # BLD MANUAL: 0.4 10E3/UL (ref 0–0.7)
EOSINOPHIL NFR BLD MANUAL: 5 %
ERYTHROCYTE [DISTWIDTH] IN BLOOD BY AUTOMATED COUNT: 13.2 % (ref 10–15)
GFR SERPL CREATININE-BSD FRML MDRD: >90 ML/MIN/1.73M2
GLUCOSE SERPL-MCNC: 79 MG/DL (ref 70–99)
HCT VFR BLD AUTO: 40.1 % (ref 40–53)
HGB BLD-MCNC: 13.3 G/DL (ref 13.3–17.7)
LYMPHOCYTES # BLD MANUAL: 4.6 10E3/UL (ref 0.8–5.3)
LYMPHOCYTES NFR BLD MANUAL: 54 %
MCH RBC QN AUTO: 35 PG (ref 26.5–33)
MCHC RBC AUTO-ENTMCNC: 33.2 G/DL (ref 31.5–36.5)
MCV RBC AUTO: 106 FL (ref 78–100)
MONOCYTES # BLD MANUAL: 1 10E3/UL (ref 0–1.3)
MONOCYTES NFR BLD MANUAL: 12 %
NEUTROPHILS # BLD MANUAL: 2.4 10E3/UL (ref 1.6–8.3)
NEUTROPHILS NFR BLD MANUAL: 28 %
PLAT MORPH BLD: NORMAL
PLATELET # BLD AUTO: 182 10E3/UL (ref 150–450)
POTASSIUM SERPL-SCNC: 4.4 MMOL/L (ref 3.4–5.3)
RBC # BLD AUTO: 3.8 10E6/UL (ref 4.4–5.9)
RBC MORPH BLD: NORMAL
SODIUM SERPL-SCNC: 139 MMOL/L (ref 136–145)
TSH SERPL DL<=0.005 MIU/L-ACNC: 1.14 UIU/ML (ref 0.3–4.2)
VALPROATE SERPL-MCNC: 42.3 UG/ML
WBC # BLD AUTO: 8.5 10E3/UL (ref 4–11)

## 2023-03-31 PROCEDURE — 80048 BASIC METABOLIC PNL TOTAL CA: CPT | Mod: ORL | Performed by: NURSE PRACTITIONER

## 2023-03-31 PROCEDURE — 84443 ASSAY THYROID STIM HORMONE: CPT | Mod: ORL | Performed by: NURSE PRACTITIONER

## 2023-03-31 PROCEDURE — 85027 COMPLETE CBC AUTOMATED: CPT | Mod: ORL | Performed by: NURSE PRACTITIONER

## 2023-03-31 PROCEDURE — P9604 ONE-WAY ALLOW PRORATED TRIP: HCPCS | Mod: ORL | Performed by: NURSE PRACTITIONER

## 2023-03-31 PROCEDURE — 85007 BL SMEAR W/DIFF WBC COUNT: CPT | Mod: ORL | Performed by: NURSE PRACTITIONER

## 2023-03-31 PROCEDURE — 80164 ASSAY DIPROPYLACETIC ACD TOT: CPT | Mod: ORL | Performed by: NURSE PRACTITIONER

## 2023-03-31 PROCEDURE — 36415 COLL VENOUS BLD VENIPUNCTURE: CPT | Mod: ORL | Performed by: NURSE PRACTITIONER

## 2023-04-09 ENCOUNTER — HEALTH MAINTENANCE LETTER (OUTPATIENT)
Age: 73
End: 2023-04-09

## 2023-06-06 ENCOUNTER — LAB REQUISITION (OUTPATIENT)
Dept: LAB | Facility: CLINIC | Age: 73
End: 2023-06-06
Payer: MEDICARE

## 2023-06-06 DIAGNOSIS — R13.10 DYSPHAGIA, UNSPECIFIED: ICD-10-CM

## 2023-06-07 LAB
ALBUMIN SERPL BCG-MCNC: 3.8 G/DL (ref 3.5–5.2)
ALP SERPL-CCNC: 85 U/L (ref 40–129)
ALT SERPL W P-5'-P-CCNC: 45 U/L (ref 10–50)
ANION GAP SERPL CALCULATED.3IONS-SCNC: 9 MMOL/L (ref 7–15)
AST SERPL W P-5'-P-CCNC: 42 U/L (ref 10–50)
BILIRUB SERPL-MCNC: 0.3 MG/DL
BUN SERPL-MCNC: 14.5 MG/DL (ref 8–23)
CALCIUM SERPL-MCNC: 9.4 MG/DL (ref 8.8–10.2)
CHLORIDE SERPL-SCNC: 103 MMOL/L (ref 98–107)
CREAT SERPL-MCNC: 0.47 MG/DL (ref 0.67–1.17)
DEPRECATED HCO3 PLAS-SCNC: 27 MMOL/L (ref 22–29)
GFR SERPL CREATININE-BSD FRML MDRD: >90 ML/MIN/1.73M2
GLUCOSE SERPL-MCNC: 76 MG/DL (ref 70–99)
POTASSIUM SERPL-SCNC: 4.4 MMOL/L (ref 3.4–5.3)
PROT SERPL-MCNC: 7.5 G/DL (ref 6.4–8.3)
SODIUM SERPL-SCNC: 139 MMOL/L (ref 136–145)

## 2023-06-07 PROCEDURE — 80053 COMPREHEN METABOLIC PANEL: CPT | Mod: ORL | Performed by: NURSE PRACTITIONER

## 2023-06-07 PROCEDURE — 36415 COLL VENOUS BLD VENIPUNCTURE: CPT | Mod: ORL | Performed by: NURSE PRACTITIONER

## 2023-06-07 PROCEDURE — P9604 ONE-WAY ALLOW PRORATED TRIP: HCPCS | Mod: ORL | Performed by: NURSE PRACTITIONER

## 2024-06-15 ENCOUNTER — HEALTH MAINTENANCE LETTER (OUTPATIENT)
Age: 74
End: 2024-06-15

## 2024-08-19 ENCOUNTER — LAB REQUISITION (OUTPATIENT)
Dept: LAB | Facility: CLINIC | Age: 74
End: 2024-08-19
Payer: MEDICARE

## 2024-08-19 DIAGNOSIS — E08.9 DIABETES MELLITUS DUE TO UNDERLYING CONDITION WITHOUT COMPLICATIONS (H): ICD-10-CM

## 2024-08-19 DIAGNOSIS — E03.9 HYPOTHYROIDISM, UNSPECIFIED: ICD-10-CM

## 2024-08-20 LAB
ANION GAP SERPL CALCULATED.3IONS-SCNC: 11 MMOL/L (ref 7–15)
BASOPHILS # BLD AUTO: 0 10E3/UL (ref 0–0.2)
BASOPHILS NFR BLD AUTO: 0 %
BUN SERPL-MCNC: 14.4 MG/DL (ref 8–23)
CALCIUM SERPL-MCNC: 9.2 MG/DL (ref 8.8–10.4)
CHLORIDE SERPL-SCNC: 99 MMOL/L (ref 98–107)
CREAT SERPL-MCNC: 0.43 MG/DL (ref 0.67–1.17)
EGFRCR SERPLBLD CKD-EPI 2021: >90 ML/MIN/1.73M2
EOSINOPHIL # BLD AUTO: 0.3 10E3/UL (ref 0–0.7)
EOSINOPHIL NFR BLD AUTO: 4 %
ERYTHROCYTE [DISTWIDTH] IN BLOOD BY AUTOMATED COUNT: 16.1 % (ref 10–15)
GLUCOSE SERPL-MCNC: 56 MG/DL (ref 70–99)
HBA1C MFR BLD: 6 %
HCO3 SERPL-SCNC: 28 MMOL/L (ref 22–29)
HCT VFR BLD AUTO: 41.1 % (ref 40–53)
HGB BLD-MCNC: 12.9 G/DL (ref 13.3–17.7)
IMM GRANULOCYTES # BLD: 0 10E3/UL
IMM GRANULOCYTES NFR BLD: 0 %
LYMPHOCYTES # BLD AUTO: 3.6 10E3/UL (ref 0.8–5.3)
LYMPHOCYTES NFR BLD AUTO: 46 %
MCH RBC QN AUTO: 30.9 PG (ref 26.5–33)
MCHC RBC AUTO-ENTMCNC: 31.4 G/DL (ref 31.5–36.5)
MCV RBC AUTO: 98 FL (ref 78–100)
MONOCYTES # BLD AUTO: 0.8 10E3/UL (ref 0–1.3)
MONOCYTES NFR BLD AUTO: 11 %
NEUTROPHILS # BLD AUTO: 3 10E3/UL (ref 1.6–8.3)
NEUTROPHILS NFR BLD AUTO: 39 %
NRBC # BLD AUTO: 0 10E3/UL
NRBC BLD AUTO-RTO: 0 /100
PLATELET # BLD AUTO: 173 10E3/UL (ref 150–450)
POTASSIUM SERPL-SCNC: 4.3 MMOL/L (ref 3.4–5.3)
RBC # BLD AUTO: 4.18 10E6/UL (ref 4.4–5.9)
SODIUM SERPL-SCNC: 138 MMOL/L (ref 135–145)
TSH SERPL DL<=0.005 MIU/L-ACNC: 2.03 UIU/ML (ref 0.3–4.2)
WBC # BLD AUTO: 7.7 10E3/UL (ref 4–11)

## 2024-08-20 PROCEDURE — 80048 BASIC METABOLIC PNL TOTAL CA: CPT | Performed by: NURSE PRACTITIONER

## 2024-08-20 PROCEDURE — P9604 ONE-WAY ALLOW PRORATED TRIP: HCPCS | Performed by: NURSE PRACTITIONER

## 2024-08-20 PROCEDURE — 36415 COLL VENOUS BLD VENIPUNCTURE: CPT | Performed by: NURSE PRACTITIONER

## 2024-08-20 PROCEDURE — 85025 COMPLETE CBC W/AUTO DIFF WBC: CPT | Performed by: NURSE PRACTITIONER

## 2024-08-20 PROCEDURE — 83036 HEMOGLOBIN GLYCOSYLATED A1C: CPT | Performed by: NURSE PRACTITIONER

## 2024-08-20 PROCEDURE — 84443 ASSAY THYROID STIM HORMONE: CPT | Performed by: NURSE PRACTITIONER

## 2025-03-23 ENCOUNTER — LAB REQUISITION (OUTPATIENT)
Dept: LAB | Facility: CLINIC | Age: 75
End: 2025-03-23
Payer: MEDICARE

## 2025-03-23 DIAGNOSIS — E03.2 HYPOTHYROIDISM DUE TO MEDICAMENTS AND OTHER EXOGENOUS SUBSTANCES: ICD-10-CM

## 2025-03-23 DIAGNOSIS — I10 ESSENTIAL (PRIMARY) HYPERTENSION: ICD-10-CM

## 2025-03-23 DIAGNOSIS — E78.5 HYPERLIPIDEMIA, UNSPECIFIED: ICD-10-CM

## 2025-03-24 LAB
ALBUMIN SERPL BCG-MCNC: 3.4 G/DL (ref 3.5–5.2)
ALP SERPL-CCNC: 72 U/L (ref 40–150)
ALT SERPL W P-5'-P-CCNC: 14 U/L (ref 0–70)
ANION GAP SERPL CALCULATED.3IONS-SCNC: 11 MMOL/L (ref 7–15)
AST SERPL W P-5'-P-CCNC: 33 U/L (ref 0–45)
BILIRUB DIRECT SERPL-MCNC: 0.1 MG/DL (ref 0–0.3)
BILIRUB SERPL-MCNC: 0.2 MG/DL
BUN SERPL-MCNC: 30.4 MG/DL (ref 8–23)
CALCIUM SERPL-MCNC: 9 MG/DL (ref 8.8–10.4)
CHLORIDE SERPL-SCNC: 103 MMOL/L (ref 98–107)
CREAT SERPL-MCNC: 0.5 MG/DL (ref 0.67–1.17)
EGFRCR SERPLBLD CKD-EPI 2021: >90 ML/MIN/1.73M2
ERYTHROCYTE [DISTWIDTH] IN BLOOD BY AUTOMATED COUNT: 14.2 % (ref 10–15)
GLUCOSE SERPL-MCNC: 121 MG/DL (ref 70–99)
HCO3 SERPL-SCNC: 27 MMOL/L (ref 22–29)
HCT VFR BLD AUTO: 44 % (ref 40–53)
HGB BLD-MCNC: 13.9 G/DL (ref 13.3–17.7)
MCH RBC QN AUTO: 34.2 PG (ref 26.5–33)
MCHC RBC AUTO-ENTMCNC: 31.6 G/DL (ref 31.5–36.5)
MCV RBC AUTO: 108 FL (ref 78–100)
PLATELET # BLD AUTO: 209 10E3/UL (ref 150–450)
POTASSIUM SERPL-SCNC: 3.8 MMOL/L (ref 3.4–5.3)
PROT SERPL-MCNC: 7.5 G/DL (ref 6.4–8.3)
RBC # BLD AUTO: 4.07 10E6/UL (ref 4.4–5.9)
SODIUM SERPL-SCNC: 141 MMOL/L (ref 135–145)
TSH SERPL DL<=0.005 MIU/L-ACNC: 0.44 UIU/ML (ref 0.3–4.2)
WBC # BLD AUTO: 15.7 10E3/UL (ref 4–11)

## 2025-03-24 PROCEDURE — P9604 ONE-WAY ALLOW PRORATED TRIP: HCPCS | Mod: ORL | Performed by: EMERGENCY MEDICINE

## 2025-03-24 PROCEDURE — 84443 ASSAY THYROID STIM HORMONE: CPT | Mod: ORL | Performed by: EMERGENCY MEDICINE

## 2025-03-24 PROCEDURE — 82248 BILIRUBIN DIRECT: CPT | Mod: ORL | Performed by: EMERGENCY MEDICINE

## 2025-03-24 PROCEDURE — 80053 COMPREHEN METABOLIC PANEL: CPT | Mod: ORL | Performed by: EMERGENCY MEDICINE

## 2025-03-24 PROCEDURE — 36415 COLL VENOUS BLD VENIPUNCTURE: CPT | Mod: ORL | Performed by: EMERGENCY MEDICINE

## 2025-03-24 PROCEDURE — 85027 COMPLETE CBC AUTOMATED: CPT | Mod: ORL | Performed by: EMERGENCY MEDICINE
